# Patient Record
Sex: FEMALE | Race: WHITE | NOT HISPANIC OR LATINO | Employment: FULL TIME | URBAN - METROPOLITAN AREA
[De-identification: names, ages, dates, MRNs, and addresses within clinical notes are randomized per-mention and may not be internally consistent; named-entity substitution may affect disease eponyms.]

---

## 2017-01-03 ENCOUNTER — APPOINTMENT (OUTPATIENT)
Dept: URGENT CARE | Age: 27
End: 2017-01-03
Payer: COMMERCIAL

## 2017-01-03 PROCEDURE — 99214 OFFICE O/P EST MOD 30 MIN: CPT | Performed by: FAMILY MEDICINE

## 2017-01-04 ENCOUNTER — GENERIC CONVERSION - ENCOUNTER (OUTPATIENT)
Dept: OTHER | Facility: OTHER | Age: 27
End: 2017-01-04

## 2017-01-06 ENCOUNTER — ALLSCRIPTS OFFICE VISIT (OUTPATIENT)
Dept: OTHER | Facility: OTHER | Age: 27
End: 2017-01-06

## 2017-03-06 ENCOUNTER — HOSPITAL ENCOUNTER (OUTPATIENT)
Dept: RADIOLOGY | Facility: CLINIC | Age: 27
Discharge: HOME | End: 2017-03-06
Payer: COMMERCIAL

## 2017-03-06 ENCOUNTER — ALLSCRIPTS OFFICE VISIT (OUTPATIENT)
Dept: OTHER | Facility: OTHER | Age: 27
End: 2017-03-06

## 2017-03-06 DIAGNOSIS — M25.572 PAIN IN LEFT ANKLE: ICD-10-CM

## 2017-03-06 PROCEDURE — 73610 X-RAY EXAM OF ANKLE: CPT

## 2017-06-19 ENCOUNTER — ALLSCRIPTS OFFICE VISIT (OUTPATIENT)
Dept: OTHER | Facility: OTHER | Age: 27
End: 2017-06-19

## 2017-06-19 LAB — S PYO AG THROAT QL: NEGATIVE

## 2017-07-25 ENCOUNTER — ALLSCRIPTS OFFICE VISIT (OUTPATIENT)
Dept: OTHER | Facility: OTHER | Age: 27
End: 2017-07-25

## 2017-07-29 ENCOUNTER — APPOINTMENT (OUTPATIENT)
Dept: LAB | Facility: CLINIC | Age: 27
End: 2017-07-29
Payer: COMMERCIAL

## 2017-07-29 ENCOUNTER — TRANSCRIBE ORDERS (OUTPATIENT)
Dept: LAB | Facility: CLINIC | Age: 27
End: 2017-07-29

## 2017-07-29 DIAGNOSIS — Z00.8 HEALTH EXAMINATION IN POPULATION SURVEY: Primary | ICD-10-CM

## 2017-07-29 PROCEDURE — 36415 COLL VENOUS BLD VENIPUNCTURE: CPT

## 2017-08-03 ENCOUNTER — TRANSCRIBE ORDERS (OUTPATIENT)
Dept: ADMINISTRATIVE | Facility: HOSPITAL | Age: 27
End: 2017-08-03

## 2017-08-03 DIAGNOSIS — R10.9 ABDOMINAL PAIN, UNSPECIFIED LOCATION: Primary | ICD-10-CM

## 2017-08-07 ENCOUNTER — APPOINTMENT (OUTPATIENT)
Dept: LAB | Facility: CLINIC | Age: 27
End: 2017-08-07
Payer: COMMERCIAL

## 2017-08-07 DIAGNOSIS — R10.9 ABDOMINAL PAIN, UNSPECIFIED LOCATION: ICD-10-CM

## 2017-08-07 LAB
ALBUMIN SERPL BCP-MCNC: 3.5 G/DL (ref 3.5–5)
ALP SERPL-CCNC: 65 U/L (ref 46–116)
ALT SERPL W P-5'-P-CCNC: 17 U/L (ref 12–78)
AMORPH PHOS CRY URNS QL MICRO: ABNORMAL /HPF
AMYLASE SERPL-CCNC: 26 IU/L (ref 25–115)
ANION GAP SERPL CALCULATED.3IONS-SCNC: 8 MMOL/L (ref 4–13)
AST SERPL W P-5'-P-CCNC: 16 U/L (ref 5–45)
BACTERIA UR QL AUTO: ABNORMAL /HPF
BASOPHILS # BLD AUTO: 0.04 THOUSANDS/ΜL (ref 0–0.1)
BASOPHILS NFR BLD AUTO: 1 % (ref 0–1)
BILIRUB SERPL-MCNC: 0.3 MG/DL (ref 0.2–1)
BILIRUB UR QL STRIP: NEGATIVE
BUN SERPL-MCNC: 12 MG/DL (ref 5–25)
CALCIUM SERPL-MCNC: 8.7 MG/DL (ref 8.3–10.1)
CHLORIDE SERPL-SCNC: 104 MMOL/L (ref 100–108)
CLARITY UR: ABNORMAL
CO2 SERPL-SCNC: 28 MMOL/L (ref 21–32)
COLOR UR: YELLOW
CREAT SERPL-MCNC: 0.78 MG/DL (ref 0.6–1.3)
EOSINOPHIL # BLD AUTO: 0.12 THOUSAND/ΜL (ref 0–0.61)
EOSINOPHIL NFR BLD AUTO: 2 % (ref 0–6)
ERYTHROCYTE [DISTWIDTH] IN BLOOD BY AUTOMATED COUNT: 12.8 % (ref 11.6–15.1)
GFR SERPL CREATININE-BSD FRML MDRD: 104 ML/MIN/1.73SQ M
GLUCOSE P FAST SERPL-MCNC: 83 MG/DL (ref 65–99)
GLUCOSE UR STRIP-MCNC: NEGATIVE MG/DL
HCT VFR BLD AUTO: 39.2 % (ref 34.8–46.1)
HGB BLD-MCNC: 12.8 G/DL (ref 11.5–15.4)
HGB UR QL STRIP.AUTO: ABNORMAL
KETONES UR STRIP-MCNC: NEGATIVE MG/DL
LEUKOCYTE ESTERASE UR QL STRIP: NEGATIVE
LYMPHOCYTES # BLD AUTO: 3.04 THOUSANDS/ΜL (ref 0.6–4.47)
LYMPHOCYTES NFR BLD AUTO: 41 % (ref 14–44)
MCH RBC QN AUTO: 28.3 PG (ref 26.8–34.3)
MCHC RBC AUTO-ENTMCNC: 32.7 G/DL (ref 31.4–37.4)
MCV RBC AUTO: 87 FL (ref 82–98)
MONOCYTES # BLD AUTO: 0.57 THOUSAND/ΜL (ref 0.17–1.22)
MONOCYTES NFR BLD AUTO: 8 % (ref 4–12)
NEUTROPHILS # BLD AUTO: 3.7 THOUSANDS/ΜL (ref 1.85–7.62)
NEUTS SEG NFR BLD AUTO: 48 % (ref 43–75)
NITRITE UR QL STRIP: NEGATIVE
NON-SQ EPI CELLS URNS QL MICRO: ABNORMAL /HPF
PH UR STRIP.AUTO: 7.5 [PH] (ref 4.5–8)
PLATELET # BLD AUTO: 234 THOUSANDS/UL (ref 149–390)
PMV BLD AUTO: 10.9 FL (ref 8.9–12.7)
POTASSIUM SERPL-SCNC: 3.9 MMOL/L (ref 3.5–5.3)
PROT SERPL-MCNC: 7.5 G/DL (ref 6.4–8.2)
PROT UR STRIP-MCNC: NEGATIVE MG/DL
RBC # BLD AUTO: 4.52 MILLION/UL (ref 3.81–5.12)
RBC #/AREA URNS AUTO: ABNORMAL /HPF
SODIUM SERPL-SCNC: 140 MMOL/L (ref 136–145)
SP GR UR STRIP.AUTO: 1.01 (ref 1–1.03)
UROBILINOGEN UR QL STRIP.AUTO: 1 E.U./DL
WBC # BLD AUTO: 7.47 THOUSAND/UL (ref 4.31–10.16)
WBC #/AREA URNS AUTO: ABNORMAL /HPF

## 2017-08-07 PROCEDURE — 85025 COMPLETE CBC W/AUTO DIFF WBC: CPT

## 2017-08-07 PROCEDURE — 81001 URINALYSIS AUTO W/SCOPE: CPT | Performed by: SURGERY

## 2017-08-07 PROCEDURE — 80053 COMPREHEN METABOLIC PANEL: CPT

## 2017-08-07 PROCEDURE — 82150 ASSAY OF AMYLASE: CPT

## 2017-08-07 PROCEDURE — 87086 URINE CULTURE/COLONY COUNT: CPT

## 2017-08-07 PROCEDURE — 36415 COLL VENOUS BLD VENIPUNCTURE: CPT

## 2017-08-08 LAB — BACTERIA UR CULT: NORMAL

## 2017-08-10 ENCOUNTER — HOSPITAL ENCOUNTER (OUTPATIENT)
Dept: RADIOLOGY | Age: 27
Discharge: HOME | End: 2017-08-10
Payer: COMMERCIAL

## 2017-08-10 ENCOUNTER — APPOINTMENT (OUTPATIENT)
Dept: LAB | Age: 27
End: 2017-08-10
Payer: COMMERCIAL

## 2017-08-10 ENCOUNTER — TRANSCRIBE ORDERS (OUTPATIENT)
Dept: ADMINISTRATIVE | Age: 27
End: 2017-08-10

## 2017-08-10 DIAGNOSIS — Z00.8 HEALTH EXAMINATION IN POPULATION SURVEY: Primary | ICD-10-CM

## 2017-08-10 DIAGNOSIS — Z00.8 HEALTH EXAMINATION IN POPULATION SURVEY: ICD-10-CM

## 2017-08-10 DIAGNOSIS — R10.9 ABDOMINAL PAIN, UNSPECIFIED LOCATION: ICD-10-CM

## 2017-08-10 PROCEDURE — 76700 US EXAM ABDOM COMPLETE: CPT

## 2017-08-11 ENCOUNTER — APPOINTMENT (OUTPATIENT)
Dept: LAB | Facility: CLINIC | Age: 27
End: 2017-08-11
Payer: COMMERCIAL

## 2017-08-11 ENCOUNTER — TRANSCRIBE ORDERS (OUTPATIENT)
Dept: LAB | Facility: CLINIC | Age: 27
End: 2017-08-11

## 2017-08-11 DIAGNOSIS — Z00.8 HEALTH EXAMINATION IN POPULATION SURVEY: Primary | ICD-10-CM

## 2017-08-11 LAB
CHOLEST SERPL-MCNC: 142 MG/DL (ref 50–200)
EST. AVERAGE GLUCOSE BLD GHB EST-MCNC: 108 MG/DL
HBA1C MFR BLD: 5.4 % (ref 4.2–6.3)
HDLC SERPL-MCNC: 53 MG/DL (ref 40–60)
LDLC SERPL CALC-MCNC: 75 MG/DL (ref 0–100)
TRIGL SERPL-MCNC: 71 MG/DL

## 2017-08-11 PROCEDURE — 80061 LIPID PANEL: CPT

## 2017-08-11 PROCEDURE — 83036 HEMOGLOBIN GLYCOSYLATED A1C: CPT

## 2017-08-11 PROCEDURE — 36415 COLL VENOUS BLD VENIPUNCTURE: CPT

## 2017-08-16 ENCOUNTER — TRANSCRIBE ORDERS (OUTPATIENT)
Dept: ADMINISTRATIVE | Age: 27
End: 2017-08-16

## 2017-08-16 ENCOUNTER — APPOINTMENT (OUTPATIENT)
Dept: LAB | Age: 27
End: 2017-08-16
Attending: PREVENTIVE MEDICINE
Payer: COMMERCIAL

## 2017-08-16 DIAGNOSIS — Z00.8 HEALTH EXAMINATION IN POPULATION SURVEY: Primary | ICD-10-CM

## 2017-08-16 DIAGNOSIS — Z00.8 HEALTH EXAMINATION IN POPULATION SURVEY: ICD-10-CM

## 2017-08-16 PROCEDURE — 86480 TB TEST CELL IMMUN MEASURE: CPT

## 2017-08-16 PROCEDURE — 36415 COLL VENOUS BLD VENIPUNCTURE: CPT

## 2017-08-18 LAB
ANNOTATION COMMENT IMP: NORMAL
GAMMA INTERFERON BACKGROUND BLD IA-ACNC: 0.03 IU/ML
M TB IFN-G BLD-IMP: NEGATIVE
M TB IFN-G CD4+ BCKGRND COR BLD-ACNC: 0.19 IU/ML
M TB IFN-G CD4+ T-CELLS BLD-ACNC: 0.22 IU/ML
MITOGEN IGNF BLD-ACNC: >10 IU/ML
QUANTIFERON-TB GOLD IN TUBE: NORMAL
SERVICE CMNT-IMP: NORMAL

## 2018-01-10 NOTE — RESULT NOTES
Verified Results  (1) THIN PREP PAP WITH IMAGING [id number] 01:41PM Minerva Haywood     Test Name Result Flag Reference   LAB AP CASE REPORT (Report)     Gynecologic Cytology Report            Case: [id number]                  Authorizing Provider: Baltazar Arechiga MD  Collected:      08/25/2016 1341        First Screen:     KEILA Lama Received:      08/29/2016 1341        Specimen:  LIQUID-BASED PAP, SCREENING, Endocervical   LAB AP GYN PRIMARY INTERPRETATION      Negative for intraepithelial lesion or malignancy  Electronically signed by KEILA Lama on 9/8/2016 at 2:04 PM   LAB AP GYN SPECIMEN ADEQUACY      Satisfactory for evaluation  Endocervical/transformation zone component present  LAB AP GYN NOTE      This specimen was analyzed by the Thin Prep Imaging System  Due to   technical Imaging issues or the physical properties of the slide specimen,   comprehensive manual rescreening by a Cytotechnologist, and/or Pathologist   was indicated  LAB AP GYN ADDITIONAL INFORMATION (Report)     [employer]'s FDA approved ,  and ThinPrep Imaging System are   utilized with strict adherence to the 's instruction manual to   prepare gynecologic and non-gynecologic cytology specimens for the   production of ThinPrep slides as well as for gynecologic ThinPrep imaging  These processes have been validated by our laboratory and/or by the     The Pap test is not a diagnostic procedure and should not be used as the   sole means to detect cervical cancer  It is only a screening procedure to   aid in the detection of cervical cancer and its precursors  Both   false-negative and false-positive results have been experienced  Your   patient's test result should be interpreted in this context together with   the history and clinical findings  Discussion/Summary   negative pap smear    repeat results in 3 years

## 2018-01-12 VITALS
BODY MASS INDEX: 31.65 KG/M2 | WEIGHT: 190 LBS | HEIGHT: 65 IN | HEART RATE: 63 BPM | SYSTOLIC BLOOD PRESSURE: 123 MMHG | DIASTOLIC BLOOD PRESSURE: 81 MMHG

## 2018-01-12 VITALS
DIASTOLIC BLOOD PRESSURE: 100 MMHG | BODY MASS INDEX: 30.7 KG/M2 | SYSTOLIC BLOOD PRESSURE: 144 MMHG | TEMPERATURE: 98.8 F | WEIGHT: 196 LBS | HEART RATE: 70 BPM

## 2018-01-12 NOTE — RESULT NOTES
Message   Pt was on Zpak        Verified Results  (1) THROAT CULTURE (CULTURE, UPPER RESPIRATORY) [id number] 07:04PM Kerri Martinez Order Number: [id number]     Test Name Result Flag Reference   CLINICAL REPORT (Report)     Test:        Throat culture  Specimen Type:   Throat  Specimen Date:   12/28/2016 7:04 PM  Result Date:    1/1/2017 4:10 PM  Result Status:   Final result  Resulting Lab:   [place] [address]            Tel: [phone]      CULTURE                                       ------------------                                   1+ Growth of Beta Hemolytic Streptococcus NOT Group A, C, or G

## 2018-01-13 VITALS
HEIGHT: 65 IN | SYSTOLIC BLOOD PRESSURE: 110 MMHG | TEMPERATURE: 98.9 F | DIASTOLIC BLOOD PRESSURE: 76 MMHG | HEART RATE: 64 BPM

## 2018-01-15 NOTE — MISCELLANEOUS
Message  Spoke with patient in regards to results  Radiology report showed not acute fracture or abnormality  Throat culture positive  Will start patient on antibiotic as she continues to have symptoms  She would also like a work note for yesterday        Plan  Strep pharyngitis    · Azithromycin 250 MG Oral Tablet; TAKE 2 TABLETS ON DAY 1 THEN TAKE 1  TABLET A DAY FOR 4 DAYS    Signatures   Electronically signed by : ADAM Givens; Jun 14 2016  4:10PM EST                       (Author)

## 2018-01-17 NOTE — RESULT NOTES
Message   Please call the patient and let her know that her ultrasound continue bladder was normal as was her urine culture which I did inform her of on Saturday  Her pain may be musculoskeletal in nature  There is no reason why she should continue the anabiotic at this time  Follow-up if symptoms do not improve  Verified Results  US KIDNEY AND BLADDER [id number] 03:10PM Ervin Hill Order Number: [id number]    - Patient Instructions: To schedule this appointment, please contact Central Scheduling at [phone]   Order Number: [id number]    - Patient Instructions: To schedule this appointment, please contact Central Scheduling at [phone]  Test Name Result Flag Reference   US KIDNEY AND BLADDER (Report)     RENAL ULTRASOUND     INDICATION: Tubular interstitial nephritis  COMPARISON: 3/20/2015  TECHNIQUE:  Ultrasound of the retroperitoneum was performed with a curvilinear transducer utilizing volumetric sweeps and still imaging techniques  FINDINGS:     KIDNEYS:   Symmetric and normal size  Right kidney: 11 7 x 4 5 cm  Normal echogenicity and contour  No suspicious masses detected  No hydronephrosis  No shadowing calculi  No perinephric fluid collections  Left kidney: 12 7 x 4 7 cm  Normal echogenicity and contour  No suspicious masses detected  No hydronephrosis  No shadowing calculi  No perinephric fluid collections  URETERS:   Nonvisualized  BLADDER:    Normally distended  No focal thickening or mass lesions  Bilateral ureteral jets detected  IMPRESSION:     Normal         Workstation performed: [id number]     Signed by: Cy Watts MD   8/29/16       Signatures   Electronically signed by : Bunny Romo, [facility];  Aug 29 2016 11:45AM EST                       (Author)

## 2018-01-17 NOTE — PROGRESS NOTES
Assessment    1  Abdominal pain, RUQ (789 01) (R10 11)   2  Need for diphtheria-tetanus-pertussis (Tdap) vaccine, adult/adolescent (V06 1) (Z23)   3  School physical exam (V70 5) (Z02 0)    Plan  Abdominal pain, RUQ    · 2 - Diego Gallagher DO  (General Surgery) Co-Management  *  Status: Active  Requested  for: Daniele Reynolds [employer] Summary provided  : Yes    Discussion/Summary  health maintenance visit To have her right upper quadrant evaluated by her surgeon Currently, she eats a healthy diet  The immunizations are up to date and DTaP given today  #1  Right upper quadrant abdominal pain-intermittent, patient to get an evaluation with her surgeon Dr Soila Valencia  #2  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 with the exception of the right upper quadrant pain  Goal to have her gallbladder taken care of before school starts at the end of August   Patient was given a DTaP today to finish off her immunizations  Follow-up when necessary  Possible side effects of new medications were reviewed with the patient/guardian today  The treatment plan was reviewed with the patient/guardian  The patient/guardian understands and agrees with the treatment plan     Self Referrals: No      Chief Complaint  PE for nursing school - [facility]      History of Present Illness  HM, Adult Female: The patient is being seen for a health maintenance evaluation  The last health maintenance visit was 1 yr year(s) ago  Social History: She is unmarried  Work status: working part-time  The patient has never smoked cigarettes  She reports rare alcohol use  The patient has no concerns about alcohol abuse  She has never used illicit drugs  General Health: The patient's health since the last visit is described as good  She has regular dental visits  She denies vision problems  She denies hearing loss  Immunizations status: up to date  Lifestyle:  She consumes a diverse and healthy diet  She does not have any weight concerns   She exercises regularly  She does not use tobacco  She denies alcohol use  She denies drug use  Reproductive health:  she reports normal menses  she uses no contraception  she is not sexually active  Screening:   HPI: This is a 48-year-old female who comes in for a nursing physical exam  Using the container diet she has lost close to 100 pounds  She is having a problem with her all bladder and this was evaluated in 2015 and was told to hold off on a laparoscopic cholecystectomy, however she has been having recent right upper quadrant pain and would like another consult with her surgeon  Her blood pressure is 96/60, temperature 98 4, weight is 182 pounds  All her immunizations are up-to-date except a DTaP and that will be administered today  Review of Systems    Constitutional: No fever, no chills, feels well, no tiredness, no recent weight gain or weight loss  ENT: no complaints of earache, no loss of hearing, no nose bleeds, no nasal discharge, no sore throat, no hoarseness  Cardiovascular: No complaints of slow heart rate, no fast heart rate, no chest pain, no palpitations, no leg claudication, no lower extremity edema  Respiratory: No complaints of shortness of breath, no wheezing, no cough, no SOB on exertion, no orthopnea, no PND  Gastrointestinal: Intermittent right upper quadrant pain, but as noted in HPI, no nausea, no vomiting, no constipation, no diarrhea and no blood in stools    The patient presents with complaints of sudden onset of intermittent episodes of moderate right upper quadrant abdominal pain, described as sharp, non-radiating  Genitourinary: No complaints of dysuria, no incontinence, no pelvic pain, no dysmenorrhea, no vaginal discharge or bleeding  Active Problems    1  Abdominal pain, RUQ (789 01) (R10 11)   2  Acute bronchitis (466 0) (J20 9)   3  Acute cystitis without hematuria (595 0) (N30 00)   4  Acute upper respiratory infection (465 9) (J06 9)   5   Acute upper respiratory infection (465 9) (J06 9)   6  Acute UTI (599 0) (N39 0)   7  Anxiety (300 00) (F41 9)   8  Bipolar I disorder, most recent episode depressed, severe without psychotic features   (296 53) (F31 4)   9  Cellulitis (682 9) (L03 90)   10  Closed fracture of metatarsal bone (825 25) (S92 309A)   11  Closed fracture of talus (825 21) (S92 109A)   12  CVA tenderness (724 5) (M54 9)   13  Depression (311) (F32 9)   14  Flank pain (789 09) (R10 9)   15  Foot pain (729 5) (M79 673)   16  Headache (784 0) (R51)   17  Injury of left femoral nerve, initial encounter (956 1) (S74 12XA)   18  Injury, ankle (959 7) (S99 919A)   19  Injury, foot (959 7) (S99 929A)   20  Insomnia (780 52) (G47 00)   21  Left ankle pain (719 47) (M25 572)   22  Left leg paresthesias (782 0) (R20 2)   23  Mastodynia (611 71) (N64 4)   24  Nausea (787 02) (R11 0)   25  Nausea with vomiting (787 01) (R11 2)   26  Pain, hand joint, right (719 44) (M79 641)   27  Pharyngitis (462) (J02 9)   28  Pyelonephritis (590 80) (N12)   29  Sinusitis (473 9) (J32 9)   30  Sore throat (462) (J02 9)   31  Sprain of other ligament of left ankle, initial encounter (845 09) (S93 492A)   32  Strep pharyngitis (034 0) (J02 0)   33  URTI (acute upper respiratory infection) (465 9) (J06 9)   34  Weight loss (783 21) (R63 4)   35   Well female exam with routine gynecological exam (V72 31) (Z01 419)    Past Medical History    · History of Ankle pain, unspecified laterality   · History of abdominal pain (V13 89) (O47 140)   · History of Metatarsalgia, unspecified laterality (726 70) (M77 40)   · History of Pain in wrist, unspecified laterality (719 43) (M25 539)   · History of School physical exam (V70 5) (Z02 0)   · History of Self-mutilation Cutting   · History of Sore throat (462) (J02 9)    Surgical History    · History of Dental Surgery    Family History  Mother    · Denied: Family history of Colon cancer   · Denied: Family history of liver disease  Father    · Denied: Family history of Colon cancer   · Denied: Family history of liver disease  Brother    · Family history of Bipolar Disorder  Family History    · Family history of gallbladder disease (V18 59) (Z83 79)    Social History    · Denied: History of Drug use   · Employed   · Exercise habits   · Never a smoker   · Single   · Social alcohol use (Z78 9)    Current Meds   1  Azithromycin 500 MG Oral Tablet; TAKE 1 TABLET DAILY UNTIL FINISHED; Therapy: 17XNJ1151 to (Last Rx:19Jun2017)  Requested for: 71FQE7657 Ordered    Allergies    1  No Known Drug Allergies  Denied    2  Amoxicillin CAPS   3  Penicillins    Vitals   Recorded: 97Ngs6916 12:23PM   Temperature 98 4 F   Heart Rate 64, L Radial   Pulse Quality Regular, L Radial   Respiration 16   Systolic 96, LUE, Sitting   Diastolic 60, LUE, Sitting   BP CUFF SIZE Large   Height 5 ft 5 in   Weight 182 lb    BMI Calculated 30 29   BSA Calculated 1 9     Physical Exam    Constitutional   General appearance: No acute distress, well appearing and well nourished  Eyes   Conjunctiva and lids: No swelling, erythema or discharge  Pupils and irises: Equal, round, reactive to light  Ears, Nose, Mouth, and Throat   External inspection of ears and nose: Normal     Otoscopic examination: Tympanic membranes translucent with normal light reflex  Canals patent without erythema  Nasal mucosa, septum, and turbinates: Normal without edema or erythema  Lips, teeth, and gums: Normal, good dentition  Oropharynx: Normal with no erythema, edema, exudate or lesions  Neck   Neck: Supple, symmetric, trachea midline, no masses  Thyroid: Normal, no thyromegaly  Pulmonary   Auscultation of lungs: Clear to auscultation  Cardiovascular   Auscultation of heart: Normal rate and rhythm, normal S1 and S2, no murmurs  Abdominal aorta: Normal     Abdomen   Abdomen: Abnormal   Tender to palpation right upper quadrant with mild guarding     Lymphatic   Palpation of lymph nodes in neck: No lymphadenopathy  Neurologic   Reflexes: 2+ and symmetric  Psychiatric   Orientation to person, place, and time: Normal     Mood and affect: Normal        Procedure    Procedure: Visual Acuity Test    Indication: routine screening  Inforrmation supplied by a Snellen chart     Results: 20/25 in both eyes with corrective device, 20/30 in the right eye with corrective device, 20/30 in the left eye with corrective device   Color vision was screened with the [place] Test and the results were normal       Signatures   Electronically signed by : Abe [facility]; Jul 25 2017  1:00PM EST                       (Author)    Electronically signed by : Sapna Parsons MD; Jul 26 2017  8:17PM EST                       (Author)

## 2018-01-18 NOTE — MISCELLANEOUS
Message  Return to work or school:   Apryl Morton is under my professional care   She was seen in my office on 02/13/16   Please excuse from work 02/14/16 due to illness           Signatures   Electronically signed by : Belen Dee, [facility]; Feb 13 2016  5:34PM EST                       (Author)    Electronically signed by : Belen Dee, [facility]; Feb 13 2016  6:30PM EST

## 2018-01-18 NOTE — PROGRESS NOTES
Assessment    1  School physical exam (V70 5) (Z02 0)    Chief Complaint  Chief Complaint Free Text Note Form: college physical      History of Present Illness  HPI: See scanned forms   Hospital Based Practices Required Assessment:   Pain Assessment   the patient states they do not have pain  Abuse And Domestic Violence Screen    Yes, the patient is safe at home  The patient states no one is hurting them  Depression And Suicide Screen  No, the patient has not had thoughts of hurting themself  No, the patient has not felt depressed in the past 7 days  Prefered Language is  english  Review of Systems  Focused-Female:   Constitutional: No fever, no chills, feels well, no tiredness, no recent weight gain or loss  ENT: no ear ache, no loss of hearing, no nosebleeds or nasal discharge, no sore throat or hoarseness  Cardiovascular: no complaints of slow or fast heart rate, no chest pain, no palpitations, no leg claudication or lower extremity edema  Respiratory: no complaints of shortness of breath, no wheezing, no dyspnea on exertion, no orthopnea or PND  Breasts: no complaints of breast pain, breast lump or nipple discharge  Gastrointestinal: no complaints of abdominal pain, no constipation, no nausea or diarrhea, no vomiting, no bloody stools  Genitourinary: no complaints of dysuria, no incontinence, no pelvic pain, no dysmenorrhea, no vaginal discharge or abnormal vaginal bleeding  Musculoskeletal: no complaints of arthralgia, no myalgia, no joint swelling or stiffness, no limb pain or swelling  Integumentary: no complaints of skin rash or lesion, no itching or dry skin, no skin wounds  Neurological: no complaints of headache, no confusion, no numbness or tingling, no dizziness or fainting  Active Problems    1  Abdominal pain, RUQ (789 01) (R10 11)   2  Anxiety (300 00) (F41 9)   3   Bipolar I disorder, most recent episode depressed, severe without psychotic features   (296 53) (F31 4)   4  Cellulitis (682 9) (L03 90)   5  Closed fracture of metatarsal bone (825 25) (S92 309A)   6  Closed fracture of talus (825 21) (S92 109A)   7  Depression (311) (F32 9)   8  Flank pain (789 09) (R10 9)   9  Foot pain (729 5) (M79 673)   10  Headache (784 0) (R51)   11  Injury of left femoral nerve, initial encounter (956 1) (S74 12XA)   12  Injury, ankle (959 7) (S99 919A)   13  Injury, foot (959 7) (S99 929A)   14  Insomnia (780 52) (G47 00)   15  Left leg paresthesias (782 0) (R20 2)   16  Mastodynia (611 71) (N64 4)   17  Nausea (787 02) (R11 0)   18  Nausea with vomiting (787 01) (R11 2)   19  Pain, hand joint, right (719 44) (M79 641)   20  Pharyngitis (462) (J02 9)   21  Sinusitis (473 9) (J32 9)   22  Sore throat (462) (J02 9)   23  Strep pharyngitis (034 0) (J02 0)   24  Upper respiratory infection (465 9) (J06 9)   25  URTI (acute upper respiratory infection) (465 9) (J06 9)   26  Weight loss (783 21) (R63 4)    Past Medical History    1  History of Ankle pain, unspecified laterality   2  History of abdominal pain (V13 89) (Z87 898)   3  History of Metatarsalgia, unspecified laterality (726 70) (M77 40)   4  History of Pain in wrist, unspecified laterality (719 43) (M25 539)   5  History of Self-mutilation Cutting  Active Problems And Past Medical History Reviewed: The active problems and past medical history were reviewed and updated today  Family History  Mother    1  Denied: Family history of Colon cancer   2  Denied: Family history of liver disease  Father    3  Denied: Family history of Colon cancer   4  Denied: Family history of liver disease  Brother    5  Family history of Bipolar Disorder  Family History    6  Family history of gallbladder disease (V18 59) (Z83 79)  Family History Reviewed: The family history was reviewed and updated today  Social History    · Never A Smoker   · Social alcohol use (Z78 9)  Social History Reviewed:  The social history was reviewed and updated today  Surgical History    1  History of Dental Surgery  Surgical History Reviewed: The surgical history was reviewed and updated today  Current Meds   1  No Reported Medications  Requested for: 11Aug2016 Recorded  Medication List Reviewed: The medication list was reviewed and updated today  Allergies    1  Penicillins    Vitals  Signs   Recorded: 11Aug2016 08:25AM   Blood Pressure: 116 mm Hg  Blood Pressure: 80 mm Hg  Heart Rate: 67  Respiration: 18  Temperature: 98 7 F, Temporal  Pain Scale: 0  O2 Saturation: 98  Height: 5 ft 5 in  Weight: 193 lb   BMI Calculated: 32 12 kg/m2  BSA Calculated: 1 95 m2    Physical Exam    Constitutional   General appearance: No acute distress, well appearing and well nourished  Eyes   Conjunctiva and lids: No swelling, erythema or discharge  Pupils and irises: Equal, round and reactive to light  Ears, Nose, Mouth, and Throat   External inspection of ears and nose: Normal     Otoscopic examination: Tympanic membranes translucent with normal light reflex  Canals patent without erythema  Nasal mucosa, septum, and turbinates: Normal without edema or erythema  Oropharynx: Normal with no erythema, edema, exudate or lesions  Pulmonary   Respiratory effort: No increased work of breathing or signs of respiratory distress  Auscultation of lungs: Clear to auscultation  Cardiovascular   Palpation of heart: Normal PMI, no thrills  Auscultation of heart: Normal rate and rhythm, normal S1 and S2, without murmurs  Examination of extremities for edema and/or varicosities: Normal     Abdomen   Abdomen: Non-tender, no masses  Liver and spleen: No hepatomegaly or splenomegaly  Lymphatic   Palpation of lymph nodes in neck: No lymphadenopathy  Musculoskeletal   Gait and station: Normal     Digits and nails: Normal without clubbing or cyanosis      Inspection/palpation of joints, bones, and muscles: Normal     Skin   Skin and subcutaneous tissue: Normal without rashes or lesions  Neurologic   Cranial nerves: Cranial nerves 2-12 intact  Reflexes: 2+ and symmetric  Sensation: No sensory loss      Psychiatric   Orientation to person, place, and time: Normal     Mood and affect: Normal        Signatures   Electronically signed by : ORI Bonner ; Aug 11 2016  8:46AM EST                       (Author)

## 2018-01-22 VITALS
HEIGHT: 65 IN | SYSTOLIC BLOOD PRESSURE: 96 MMHG | TEMPERATURE: 98.4 F | RESPIRATION RATE: 16 BRPM | HEART RATE: 64 BPM | WEIGHT: 182 LBS | DIASTOLIC BLOOD PRESSURE: 60 MMHG | BODY MASS INDEX: 30.32 KG/M2

## 2018-01-31 ENCOUNTER — HOSPITAL ENCOUNTER (OUTPATIENT)
Dept: ULTRASOUND IMAGING | Facility: HOSPITAL | Age: 28
Discharge: HOME | End: 2018-01-31
Payer: COMMERCIAL

## 2018-01-31 ENCOUNTER — TRANSCRIBE ORDERS (OUTPATIENT)
Dept: ADMINISTRATIVE | Facility: HOSPITAL | Age: 28
End: 2018-01-31

## 2018-01-31 ENCOUNTER — OFFICE VISIT (OUTPATIENT)
Dept: FAMILY MEDICINE CLINIC | Facility: CLINIC | Age: 28
End: 2018-01-31
Payer: COMMERCIAL

## 2018-01-31 VITALS
TEMPERATURE: 98.6 F | BODY MASS INDEX: 30.66 KG/M2 | SYSTOLIC BLOOD PRESSURE: 100 MMHG | DIASTOLIC BLOOD PRESSURE: 70 MMHG | HEIGHT: 65 IN | HEART RATE: 64 BPM | WEIGHT: 184 LBS

## 2018-01-31 DIAGNOSIS — R31.29 OTHER MICROSCOPIC HEMATURIA: ICD-10-CM

## 2018-01-31 DIAGNOSIS — R10.9 ACUTE LEFT FLANK PAIN: ICD-10-CM

## 2018-01-31 DIAGNOSIS — R68.83 CHILLS (WITHOUT FEVER): ICD-10-CM

## 2018-01-31 DIAGNOSIS — R68.83 CHILLS (WITHOUT FEVER): Primary | ICD-10-CM

## 2018-01-31 LAB
SL AMB  POCT GLUCOSE, UA: NEGATIVE
SL AMB LEUKOCYTE ESTERASE,UA: NEGATIVE
SL AMB POCT BILIRUBIN,UA: NEGATIVE
SL AMB POCT BLOOD,UA: ABNORMAL
SL AMB POCT CLARITY,UA: CLEAR
SL AMB POCT COLOR,UA: YELLOW
SL AMB POCT KETONES,UA: NEGATIVE
SL AMB POCT NITRITE,UA: NEGATIVE
SL AMB POCT PH,UA: 7
SL AMB POCT SPECIFIC GRAVITY,UA: 1.01
UROBILINOGEN UR QL STRIP.AUTO: 0.2 E.U./DL

## 2018-01-31 PROCEDURE — 76770 US EXAM ABDO BACK WALL COMP: CPT

## 2018-01-31 PROCEDURE — 99213 OFFICE O/P EST LOW 20 MIN: CPT | Performed by: FAMILY MEDICINE

## 2018-01-31 PROCEDURE — 87086 URINE CULTURE/COLONY COUNT: CPT | Performed by: FAMILY MEDICINE

## 2018-01-31 PROCEDURE — 81002 URINALYSIS NONAUTO W/O SCOPE: CPT | Performed by: FAMILY MEDICINE

## 2018-01-31 RX ORDER — ONDANSETRON 4 MG/1
TABLET, ORALLY DISINTEGRATING ORAL
COMMUNITY
Start: 2018-01-29 | End: 2019-03-14 | Stop reason: SDUPTHER

## 2018-01-31 RX ORDER — OMEPRAZOLE 40 MG/1
40 CAPSULE, DELAYED RELEASE ORAL DAILY
COMMUNITY
End: 2018-08-09 | Stop reason: ALTCHOICE

## 2018-01-31 NOTE — ASSESSMENT & PLAN NOTE
-patient is tender to palpation and CVA tenderness on the left  She has had a history of renal calculi but mostly on the right

## 2018-01-31 NOTE — ASSESSMENT & PLAN NOTE
-patient's urine dip showed trace of blood  Ultrasound referral   Urine was sent for culture and sensitivity

## 2018-01-31 NOTE — PROGRESS NOTES
Assessment/Plan:    No problem-specific Assessment & Plan notes found for this encounter  Diagnoses and all orders for this visit:    Left flank pain  -     POCT urine dip    Chills (without fever)  -     POCT urine dip    Other orders  -     ondansetron (ZOFRAN-ODT) 4 mg disintegrating tablet;   -     omeprazole (PriLOSEC) 40 MG capsule; Take 40 mg by mouth daily          Subjective:      Patient ID: Alex Ramirez is a 28 y o  female  CC:  Left flank pain that started Sunday  Nausea, chills  States it feels similar to when she had pyelonephritis a few years ago  -   [facility]        The following portions of the patient's history were reviewed and updated as appropriate: allergies, current medications, past family history, past medical history, past social history, past surgical history and problem list     Review of Systems   Constitutional: Negative  HENT: Negative  Eyes: Negative  Respiratory: Negative  Cardiovascular: Negative  Gastrointestinal: Negative  Endocrine: Negative  Genitourinary: Positive for flank pain, frequency, hematuria and urgency  Negative for difficulty urinating, dyspareunia and dysuria  Skin: Negative  Allergic/Immunologic: Negative  Neurological: Negative  Hematological: Negative  Psychiatric/Behavioral: Negative  Objective:     Physical Exam   Constitutional: She is oriented to person, place, and time  She appears well-developed and well-nourished  HENT:   Head: Normocephalic  Right Ear: External ear normal    Left Ear: External ear normal    Mouth/Throat: Oropharynx is clear and moist    Eyes: Conjunctivae and EOM are normal  Pupils are equal, round, and reactive to light  Neck: Normal range of motion  Neck supple  Cardiovascular: Normal rate, regular rhythm and normal heart sounds  Pulmonary/Chest: Effort normal and breath sounds normal    Abdominal: Soft   Bowel sounds are normal    Genitourinary:   Genitourinary Comments: Positive left CVA tenderness   Musculoskeletal: Normal range of motion  Neurological: She is alert and oriented to person, place, and time  Skin: Skin is warm  Psychiatric: She has a normal mood and affect  Her behavior is normal  Judgment and thought content normal    Nursing note and vitals reviewed        Vitals:    01/31/18 0919   BP: 100/70   BP Location: Left arm   Patient Position: Sitting   Cuff Size: Large   Pulse: 64   Temp: 98 6 °F (37 °C)   TempSrc: Oral   Weight: 83 5 kg (184 lb)   Height: 5' 5" (1 651 m)

## 2018-01-31 NOTE — PROGRESS NOTES
Assessment/Plan:    No problem-specific Assessment & Plan notes found for this encounter  Diagnoses and all orders for this visit:    Left flank pain  -     POCT urine dip    Chills (without fever)  -     POCT urine dip    Other orders  -     ondansetron (ZOFRAN-ODT) 4 mg disintegrating tablet;   -     omeprazole (PriLOSEC) 40 MG capsule; Take 40 mg by mouth daily          Subjective:      Patient ID: Dereje Son is a 28 y o  female      HPI    The following portions of the patient's history were reviewed and updated as appropriate: allergies, current medications, past family history, past medical history, past social history, past surgical history and problem list     Review of Systems      Objective:     Physical Exam

## 2018-02-02 ENCOUNTER — OFFICE VISIT (OUTPATIENT)
Dept: FAMILY MEDICINE CLINIC | Facility: CLINIC | Age: 28
End: 2018-02-02
Payer: COMMERCIAL

## 2018-02-02 VITALS
HEART RATE: 68 BPM | DIASTOLIC BLOOD PRESSURE: 66 MMHG | WEIGHT: 183 LBS | HEIGHT: 65 IN | SYSTOLIC BLOOD PRESSURE: 100 MMHG | BODY MASS INDEX: 30.49 KG/M2

## 2018-02-02 DIAGNOSIS — R10.84 GENERALIZED ABDOMINAL PAIN: Primary | ICD-10-CM

## 2018-02-02 DIAGNOSIS — R68.83 CHILLS (WITHOUT FEVER): ICD-10-CM

## 2018-02-02 DIAGNOSIS — R10.9 ACUTE LEFT FLANK PAIN: ICD-10-CM

## 2018-02-02 LAB — BACTERIA UR CULT: NORMAL

## 2018-02-02 PROCEDURE — 99214 OFFICE O/P EST MOD 30 MIN: CPT | Performed by: FAMILY MEDICINE

## 2018-02-02 NOTE — PROGRESS NOTES
I have reviewed the notes, assessments, and/or procedures performed by Rohit Chirinos, I concur with her/his documentation of Hollie Oms

## 2018-02-02 NOTE — ASSESSMENT & PLAN NOTE
Patient has developed intermittent nausea and generalized abdominal discomfort but no actual pain  On her own, she made an appointment with [facility], Dr Pollock Median

## 2018-02-02 NOTE — PROGRESS NOTES
Assessment/Plan:    Acute left flank pain  Left flank pain has resolved considerably with the use of ibuprofen  Her ultrasound of the kidneys and bladder were negative  She does not have a renal calculus  Chills (without fever)  Chills have resolved with ibuprofen  Generalized abdominal pain  Patient has developed intermittent nausea and generalized abdominal discomfort but no actual pain  On her own, she made an appointment with [facility], Dr Kim Montiel  Diagnoses and all orders for this visit:    Generalized abdominal pain  -     Ambulatory referral to Gastroenterology; Future    Acute left flank pain    Chills (without fever)          Subjective:      Patient ID: Darron Baltazar is a 28 y o  female  CC:  Follow up to left flank pain  Pt states symptoms are still present, but somewhat improved with Ibuprofen  -  lsh    HPI:  This is a 50-year-old female who comes in for a 3 day check of her left flank pain  She has been using ibuprofen which has helped the flank pain considerably  The pain is not as severe as it was several days ago  She has not noticed any blood in her urine  Her ultrasound showed no renal calculus  In the meantime she has developed some generalized abdominal discomfort but no pain and has accompaning nausea  The nausea is only slight and it is intermittent  On her own she made an appointment with Dr Laird Cowden gastroenterologist and we will try to move that appointment up because was made for March  The following portions of the patient's history were reviewed and updated as appropriate: allergies, current medications, past family history, past medical history, past social history, past surgical history and problem list     Review of Systems   Constitutional: Negative  HENT: Negative  Eyes: Negative  Respiratory: Negative  Cardiovascular: Negative  Gastrointestinal: Positive for nausea   Negative for abdominal distention, abdominal pain, constipation and diarrhea  Generalized abdominal discomfort   Endocrine: Negative  Genitourinary: Negative  Musculoskeletal: Negative  Skin: Negative  Allergic/Immunologic: Negative  Neurological: Negative  Hematological: Negative  Psychiatric/Behavioral: Negative  Objective:  Vitals:    02/02/18 0804   BP: 100/66   BP Location: Left arm   Patient Position: Sitting   Cuff Size: Large   Pulse: 68   Weight: 83 kg (183 lb)   Height: 5' 5" (1 651 m)      Physical Exam   Constitutional: She is oriented to person, place, and time  She appears well-developed and well-nourished  HENT:   Head: Normocephalic  Right Ear: External ear normal    Left Ear: External ear normal    Mouth/Throat: Oropharynx is clear and moist    Eyes: Conjunctivae and EOM are normal  Pupils are equal, round, and reactive to light  Neck: Normal range of motion  Neck supple  Cardiovascular: Normal rate, regular rhythm and normal heart sounds  Pulmonary/Chest: Effort normal and breath sounds normal    Abdominal: Soft  Bowel sounds are normal  She exhibits no distension and no mass  There is tenderness  There is no rebound and no guarding  Musculoskeletal: Normal range of motion  Neurological: She is alert and oriented to person, place, and time  Skin: Skin is warm  Psychiatric: She has a normal mood and affect  Her behavior is normal  Judgment and thought content normal    Nursing note and vitals reviewed

## 2018-02-19 ENCOUNTER — TELEPHONE (OUTPATIENT)
Dept: URGENT CARE | Facility: MEDICAL CENTER | Age: 28
End: 2018-02-19

## 2018-02-19 ENCOUNTER — APPOINTMENT (OUTPATIENT)
Dept: RADIOLOGY | Facility: MEDICAL CENTER | Age: 28
End: 2018-02-19
Payer: COMMERCIAL

## 2018-02-19 ENCOUNTER — OFFICE VISIT (OUTPATIENT)
Dept: URGENT CARE | Facility: MEDICAL CENTER | Age: 28
End: 2018-02-19
Payer: COMMERCIAL

## 2018-02-19 VITALS
OXYGEN SATURATION: 98 % | RESPIRATION RATE: 18 BRPM | BODY MASS INDEX: 30.12 KG/M2 | WEIGHT: 181 LBS | SYSTOLIC BLOOD PRESSURE: 117 MMHG | TEMPERATURE: 98.9 F | DIASTOLIC BLOOD PRESSURE: 83 MMHG | HEART RATE: 67 BPM

## 2018-02-19 DIAGNOSIS — S93.401A SPRAIN OF RIGHT ANKLE, UNSPECIFIED LIGAMENT, INITIAL ENCOUNTER: Primary | ICD-10-CM

## 2018-02-19 PROCEDURE — 73630 X-RAY EXAM OF FOOT: CPT

## 2018-02-19 PROCEDURE — S9088 SERVICES PROVIDED IN URGENT: HCPCS | Performed by: FAMILY MEDICINE

## 2018-02-19 PROCEDURE — 99213 OFFICE O/P EST LOW 20 MIN: CPT | Performed by: FAMILY MEDICINE

## 2018-02-19 PROCEDURE — 73610 X-RAY EXAM OF ANKLE: CPT

## 2018-02-19 NOTE — PROGRESS NOTES
[facility]        NAME: Óscar Quezada is a 28 y o  female  : 1990    MRN: [id number]  DATE: 2018  TIME: 9:20 AM    Assessment and Plan   Sprain of right ankle, unspecified ligament, initial encounter [S93 401A]  1  Sprain of right ankle, unspecified ligament, initial encounter  XR foot 3+ vw right    XR ankle 3+ vw right         Patient Instructions   No acute fracture noted on xray- will call if radiology report differs  Continue to wear boot for support  Ice 10-15 minutes every 3-4 hours  Keep the foot elevated above the level of the heart  Tylenol/ibprofen for pain  Follow up with your orthopedist if your pain is not improving  Follow up with PCP in 3-5 days  Proceed to  ER if symptoms worsen  Chief Complaint     Chief Complaint   Patient presents with    Ankle Injury    Foot Injury         History of Present Illness   Óscar Quezada presents to the clinic c/o      This is a 21-year-old female presenting after right ankle injury last night  She states that she twisted her ankle in an eversion type fashion, is now having pain in the lateral ankle down through the 4th and 5th metatarsals  His she does have a history of a 5th metatarsal fracture, ankle sprains  She denies any numbness, tingling, weakness  She was not able to weightbear initially after the incident, is now wearing a boot which is helping her to walk  Review of Systems   Review of Systems   Constitutional: Negative for chills, fatigue and fever  Respiratory: Negative  Cardiovascular: Negative  Musculoskeletal: Positive for arthralgias, gait problem and joint swelling  Negative for back pain, neck pain and neck stiffness  Skin: Negative for color change, pallor and wound  Neurological: Negative for dizziness, weakness and light-headedness           Current Medications     Long-Term Prescriptions   Medication Sig Dispense Refill    omeprazole (PriLOSEC) 40 MG capsule Take 40 mg by mouth daily      ondansetron (ZOFRAN-ODT) 4 mg disintegrating tablet          Current Allergies     Allergies as of 02/19/2018    (No Known Allergies)            The following portions of the patient's history were reviewed and updated as appropriate: allergies, current medications, past family history, past medical history, past social history, past surgical history and problem list     Objective   /83   Pulse 67   Temp 98 9 °F (37 2 °C) (Temporal)   Resp 18   Wt 82 1 kg (181 lb)   SpO2 98%   BMI 30 12 kg/m²        Physical Exam     Physical Exam   Constitutional: She appears well-developed and well-nourished  No distress  Cardiovascular: Normal rate, regular rhythm and normal heart sounds  Pulmonary/Chest: Effort normal and breath sounds normal  No respiratory distress  She has no wheezes  She has no rales  Musculoskeletal:     Right foot:  There is mild edema of the foot and ankle, no overlying skin changes or wounds  There is tenderness to palpation over the lateral malleolus, just distal to the lateral malleolus, on the lateral aspect of the foot and into the metatarsals  Range of motion decreased due to pain  Sensation intact, cap refill less than 2 seconds  No joint laxity noted  Neurological: She is alert  Skin: Skin is warm and dry  No rash noted  She is not diaphoretic  No erythema  No pallor  Psychiatric: She has a normal mood and affect

## 2018-02-19 NOTE — PROGRESS NOTES
Pt states she slipped on water in garage yesterday injuring R ankle and foot  Has has previous injuries to same  Pain decreased to 4/10 from 8/10 after motrin

## 2018-02-19 NOTE — PATIENT INSTRUCTIONS
No acute fracture noted on xray- will call if radiology report differs  Continue to wear boot for support  Ice 10-15 minutes every 3-4 hours  Keep the foot elevated above the level of the heart  Tylenol/ibprofen for pain  Follow up with your orthopedist if your pain is not improving  Go to the ER for any distress

## 2018-02-22 ENCOUNTER — OFFICE VISIT (OUTPATIENT)
Dept: OBGYN CLINIC | Facility: MEDICAL CENTER | Age: 28
End: 2018-02-22
Payer: COMMERCIAL

## 2018-02-22 VITALS
DIASTOLIC BLOOD PRESSURE: 78 MMHG | WEIGHT: 159 LBS | HEIGHT: 67 IN | HEART RATE: 84 BPM | BODY MASS INDEX: 24.96 KG/M2 | SYSTOLIC BLOOD PRESSURE: 114 MMHG

## 2018-02-22 DIAGNOSIS — S86.309A PERONEAL TENDON INJURY, UNSPECIFIED LATERALITY, INITIAL ENCOUNTER: Primary | ICD-10-CM

## 2018-02-22 PROCEDURE — 99213 OFFICE O/P EST LOW 20 MIN: CPT | Performed by: ORTHOPAEDIC SURGERY

## 2018-02-22 NOTE — PROGRESS NOTES
Assessment:  No diagnosis found  Patient Active Problem List   Diagnosis    Abdominal pain, RUQ    Anxiety    Bipolar I disorder, most recent episode depressed, severe without psychotic features ([facility] [address] )    Depression    Insomnia    Pyelonephritis    Acute left flank pain    Chills (without fever)    Other microscopic hematuria    Generalized abdominal pain           Plan       plan is to get an MRI given her multiple injuries to this ankle multiple ankle sprains and location of tenderness being on the peroneal tendon sheath I suspect a peroneal tendon tear  She also had significant ecchymosis which is consistent with a ankle sprain but the fact of the matter is we work with this patient many times and she has been able tolerate walking on her ankles normally this time is different she reports more pain and she does tolerate pain rather well  Subjective:     Patient ID:    Chief Complaint:Ulisses Dyer 28 y o  female      HPI    Patient comes in with regards to her right foot and ankle  On Sunday night she was stepping out of the garage and tripped over shoe twisting her ankle in a inverted opposition  She showed pictures to me today that showed extensive ecchymosis that extended basically from the posterior aspect of the fibula all way down the lateral aspect of the foot and dorsum of the foot involving over half of the foot  The ecchymosis is resolving the swelling has significantly improved but she still having pain with ambulation to the point that she can put all her weight on her leg  She reports the pain can be as high as 10 out 10 and as low as 5 out 10  Ice and ibuprofen have been utilize she has a previous Cam boot that she has been wearing as well      The following portions of the patient's history were reviewed and updated as appropriate: allergies, current medications, past family history, past social history, past surgical history and problem list         Social History Social History    Marital status: Single     Spouse name: N/A    Number of children: N/A    Years of education: N/A     Occupational History    employed       Social History Main Topics    Smoking status: Never Smoker    Smokeless tobacco: Never Used    Alcohol use Yes      Comment: occasional - social     Drug use: No    Sexual activity: Not on file     Other Topics Concern    Not on file     Social History Narrative    Exercise habits      Past Medical History:   Diagnosis Date    Foot fracture, right     GERD (gastroesophageal reflux disease)     Metatarsalgia     Self-mutilation     cutting      Past Surgical History:   Procedure Laterality Date    DENTAL SURGERY       No Known Allergies  Current Outpatient Prescriptions on File Prior to Visit   Medication Sig Dispense Refill    omeprazole (PriLOSEC) 40 MG capsule Take 40 mg by mouth daily      ondansetron (ZOFRAN-ODT) 4 mg disintegrating tablet        No current facility-administered medications on file prior to visit  Objective:    Review of Systems   Constitutional: Negative  HENT: Negative  Eyes: Negative  Respiratory: Negative  Cardiovascular: Negative  Gastrointestinal: Negative  Negative for vomiting  Endocrine: Negative  Genitourinary: Negative  Skin: Negative  Allergic/Immunologic: Negative  Neurological: Negative  Hematological: Negative  Psychiatric/Behavioral: Negative  All other systems reviewed and are negative  Right Ankle Exam   Swelling: mild    Tenderness   The patient is experiencing tenderness in the CF and lateral malleolus  Range of Motion   Dorsiflexion: abnormal   Plantar flexion: abnormal   Inversion: abnormal   Eversion: abnormal     Muscle Strength   Right ankle normal muscle strength: I did not test strength because her range of motion is limited as is her pain preventing full assessment      Tests   Anterior drawer: 1+  Other   Erythema: absent  Sensation: normal  Pulse: present     Comments:    Patient is pinpoint tender on the lateral aspect of the ankle specifically anterior talofibular ligament lateral calcaneofibular ligament and posterior calcaneofibular ligament  There is also tenderness along the peroneal tendon as occurs around the lateral malleolus and inserts into the 5th metatarsal   Patient does have some mild base of metatarsal pain but it is not as extensive as 1-2 cm proximal to this area  Left Ankle Exam   Left ankle exam is normal             Physical Exam   Constitutional: She is oriented to person, place, and time  She appears well-developed  HENT:   Head: Normocephalic  Eyes: Pupils are equal, round, and reactive to light  Neck: Normal range of motion  Cardiovascular: Normal rate  Pulmonary/Chest: Effort normal    Abdominal: She exhibits no distension  Neurological: She is alert and oriented to person, place, and time  Skin: Skin is warm  Psychiatric: She has a normal mood and affect  Nursing note and vitals reviewed  I have personally reviewed pertinent films in PACS  Portions of the record may have been created with voice recognition software   Occasional wrong word or "sound a like" substitutions may have occurred due to the inherent limitations of voice recognition software   Read the chart carefully and recognize, using context, where substitutions have occurred

## 2018-02-24 ENCOUNTER — HOSPITAL ENCOUNTER (OUTPATIENT)
Dept: MRI IMAGING | Facility: HOSPITAL | Age: 28
Discharge: HOME | End: 2018-02-24
Attending: ORTHOPAEDIC SURGERY
Payer: COMMERCIAL

## 2018-02-24 DIAGNOSIS — S86.309A PERONEAL TENDON INJURY, UNSPECIFIED LATERALITY, INITIAL ENCOUNTER: ICD-10-CM

## 2018-02-24 PROCEDURE — 73721 MRI JNT OF LWR EXTRE W/O DYE: CPT

## 2018-02-26 ENCOUNTER — HOSPITAL ENCOUNTER (OUTPATIENT)
Dept: MRI IMAGING | Facility: HOSPITAL | Age: 28
Discharge: HOME | End: 2018-02-26

## 2018-02-26 DIAGNOSIS — M25.571 RIGHT ANKLE PAIN: ICD-10-CM

## 2018-03-01 ENCOUNTER — OFFICE VISIT (OUTPATIENT)
Dept: OBGYN CLINIC | Facility: MEDICAL CENTER | Age: 28
End: 2018-03-01
Payer: COMMERCIAL

## 2018-03-01 VITALS
DIASTOLIC BLOOD PRESSURE: 82 MMHG | WEIGHT: 159 LBS | HEART RATE: 66 BPM | SYSTOLIC BLOOD PRESSURE: 120 MMHG | BODY MASS INDEX: 24.96 KG/M2 | HEIGHT: 67 IN

## 2018-03-01 DIAGNOSIS — T14.8XXA CONTUSION OF BONE: ICD-10-CM

## 2018-03-01 DIAGNOSIS — S86.301A INJURY OF PERONEAL TENDON OF RIGHT FOOT, INITIAL ENCOUNTER: Primary | ICD-10-CM

## 2018-03-01 PROCEDURE — 99212 OFFICE O/P EST SF 10 MIN: CPT | Performed by: ORTHOPAEDIC SURGERY

## 2018-03-01 NOTE — LETTER
March 1, 2018     Patient: Timothy Waldron   YOB: 1990   Date of Visit: 3/1/2018       To Whom it May Concern:    Timothy Waldron is under my professional care  She was seen in my office on 3/1/2018  She may return to work with limitations She may also return to school clinical [employer] with the same restriction  no more than 30 minutes of walking icing periodically up to 3 times a day for 20 minutes  Must wear the boot  Otherwise activity is unrestricted       If you have any questions or concerns, please don't hesitate to call           Sincerely,          Jadiel Mtz DO        CC: Timothy Waldron

## 2018-03-01 NOTE — PROGRESS NOTES
Assessment:  1  Injury of peroneal tendon of right foot, initial encounter  Ambulatory referral to Physical Therapy   2  Contusion of bone       Patient Active Problem List   Diagnosis    Abdominal pain, RUQ    Anxiety    Bipolar I disorder, most recent episode depressed, severe without psychotic features ([facility] [address] )    Depression    Insomnia    Pyelonephritis    Acute left flank pain    Chills (without fever)    Other microscopic hematuria    Generalized abdominal pain    Peroneal tendon injury    Contusion of bone           Plan       continue with current restrictions  Use Epson salt soaks will also provide steroid to help with the inflammation   Follow up with us in 4 weeks at which time she will be 6 weeks out from her injury and hopefully we can allow her to return to normal activity  Subjective:     Patient ID:    Chief Complaint:Ulisses Dyer 25 y o  female      HPI      Patient comes in today for routine follow-up regarding her right ankle we ordered an MRI for suspicion of a peroneal tendon tear  She has had multiple injuries to this ankle and had extensive swelling and edema and ecchymosis  Patient reports that it has been aggravated since we last saw her has had increased swelling as well        The following portions of the patient's history were reviewed and updated as appropriate: allergies, current medications, past family history, past social history, past surgical history and problem list         Social History     Social History    Marital status: Single     Spouse name: N/A    Number of children: N/A    Years of education: N/A     Occupational History    employed       Social History Main Topics    Smoking status: Never Smoker    Smokeless tobacco: Never Used    Alcohol use Yes      Comment: occasional - social     Drug use: No    Sexual activity: Not on file     Other Topics Concern    Not on file     Social History Narrative    Exercise habits      Past Medical History:   Diagnosis Date    Foot fracture, right     GERD (gastroesophageal reflux disease)     Metatarsalgia     Self-mutilation     cutting      Past Surgical History:   Procedure Laterality Date    DENTAL SURGERY       No Known Allergies  Current Outpatient Prescriptions on File Prior to Visit   Medication Sig Dispense Refill    omeprazole (PriLOSEC) 40 MG capsule Take 40 mg by mouth daily      ondansetron (ZOFRAN-ODT) 4 mg disintegrating tablet        No current facility-administered medications on file prior to visit  Objective:        Ortho Exam     exam shows swelling ecchymosis is almost resolved but the swelling is still present  Range of motion is slightly limited as well  I have personally reviewed pertinent films in PACS and my interpretation is Inflammation around the peroneal tendon posterior tibial tendon  There is also bony edema in the cuboid    further examination of the  MRI shows that there may be a partial tear of the peroneal tendon just distal to the fibula  Best seen on image 22 series 6 the tear is less than 50%    Portions of the record may have been created with voice recognition software   Occasional wrong word or "sound a like" substitutions may have occurred due to the inherent limitations of voice recognition software   Read the chart carefully and recognize, using context, where substitutions have occurred

## 2018-03-12 ENCOUNTER — OFFICE VISIT (OUTPATIENT)
Dept: FAMILY MEDICINE CLINIC | Facility: CLINIC | Age: 28
End: 2018-03-12
Payer: COMMERCIAL

## 2018-03-12 VITALS
WEIGHT: 184 LBS | DIASTOLIC BLOOD PRESSURE: 64 MMHG | HEIGHT: 67 IN | SYSTOLIC BLOOD PRESSURE: 94 MMHG | BODY MASS INDEX: 28.88 KG/M2 | TEMPERATURE: 98.5 F | HEART RATE: 68 BPM

## 2018-03-12 DIAGNOSIS — J01.90 ACUTE NON-RECURRENT SINUSITIS, UNSPECIFIED LOCATION: Primary | ICD-10-CM

## 2018-03-12 PROCEDURE — 99213 OFFICE O/P EST LOW 20 MIN: CPT | Performed by: FAMILY MEDICINE

## 2018-03-12 RX ORDER — AZITHROMYCIN 500 MG/1
500 TABLET, FILM COATED ORAL DAILY
Qty: 3 TABLET | Refills: 0 | Status: SHIPPED | OUTPATIENT
Start: 2018-03-12 | End: 2018-03-15

## 2018-03-12 NOTE — ASSESSMENT & PLAN NOTE
The patient was placed on Zithromax 500 mg 1 daily #3  She is to use Coricidin HBP/cold and flu 1 tablet 3 times a day and Delsym 2 tsp twice a day for her cough

## 2018-03-12 NOTE — PROGRESS NOTES
Assessment/Plan:    Acute non-recurrent sinusitis  The patient was placed on Zithromax 500 mg 1 daily #3  She is to use Coricidin HBP/cold and flu 1 tablet 3 times a day and Delsym 2 tsp twice a day for her cough  Diagnoses and all orders for this visit:    Acute non-recurrent sinusitis, unspecified location  -     azithromycin (ZITHROMAX) 500 MG tablet; Take 1 tablet (500 mg total) by mouth daily for 3 days          Subjective:      Patient ID: Adriane Winchester is a 28 y o  female  CC:  Sore throat, B/L ear pain, sinus pressure that started Friday  PND   -  lsh    HPI:  This is a 23-year-old female who comes in with a sore throat, bilateral ear pain, sinus pressure and postnasal drip for the past 5 days  She has taken medication with a decongestant in and almost passed out in the shower  She has been warned in the past not to take anything with the decongestant because of her blood pressure  Her blood pressure today is 94/64 and her temperature is 98 5°  She does have a nonproductive slight cough which can keep her awake at night  She denies any nausea, vomiting or diarrhea  The following portions of the patient's history were reviewed and updated as appropriate: allergies, current medications, past family history, past medical history, past social history, past surgical history and problem list     Review of Systems   Constitutional: Negative  Negative for chills, fatigue and fever  HENT: Positive for congestion, ear pain, postnasal drip, rhinorrhea, sinus pressure and sore throat  Eyes: Negative  Respiratory: Positive for cough  Negative for shortness of breath and wheezing  Cardiovascular: Negative  Gastrointestinal: Negative  Negative for diarrhea, nausea and vomiting  Endocrine: Negative  Genitourinary: Negative  Musculoskeletal: Positive for myalgias  Skin: Negative  Allergic/Immunologic: Negative  Neurological: Negative  Hematological: Negative  Psychiatric/Behavioral: Negative  Vitals:    03/12/18 1348   BP: 94/64   BP Location: Left arm   Patient Position: Sitting   Cuff Size: Large   Pulse: 68   Temp: 98 5 °F (36 9 °C)   TempSrc: Oral   Weight: 83 5 kg (184 lb)   Height: 5' 7" (1 702 m)   Objective:      BP 94/64 (BP Location: Left arm, Patient Position: Sitting, Cuff Size: Large)   Pulse 68   Temp 98 5 °F (36 9 °C) (Oral)   Ht 5' 7" (1 702 m)   Wt 83 5 kg (184 lb)   BMI 28 82 kg/m²          Physical Exam   Constitutional: She is oriented to person, place, and time  She appears well-developed and well-nourished  HENT:   Head: Normocephalic  Mouth/Throat: No oropharyngeal exudate  Tympanic membranes bilaterally are dull with no erythema or injection  Positive postnasal drip, +1 erythema of posterior pharynx without exudates  Eyes: Conjunctivae and EOM are normal  Pupils are equal, round, and reactive to light  Neck: Normal range of motion  Neck supple  Cardiovascular: Normal rate, regular rhythm and normal heart sounds  Pulmonary/Chest: Effort normal and breath sounds normal  She has no wheezes  She has no rales  Abdominal: Soft  Bowel sounds are normal    Musculoskeletal: Normal range of motion  Lymphadenopathy:     She has no cervical adenopathy  Neurological: She is alert and oriented to person, place, and time  Skin: Skin is warm  Psychiatric: She has a normal mood and affect   Her behavior is normal  Judgment and thought content normal

## 2018-03-16 ENCOUNTER — OFFICE VISIT (OUTPATIENT)
Dept: FAMILY MEDICINE CLINIC | Facility: CLINIC | Age: 28
End: 2018-03-16
Payer: COMMERCIAL

## 2018-03-16 VITALS
BODY MASS INDEX: 28.72 KG/M2 | WEIGHT: 183 LBS | TEMPERATURE: 97.8 F | HEIGHT: 67 IN | DIASTOLIC BLOOD PRESSURE: 82 MMHG | SYSTOLIC BLOOD PRESSURE: 106 MMHG

## 2018-03-16 DIAGNOSIS — R05.9 COUGH: ICD-10-CM

## 2018-03-16 DIAGNOSIS — J01.90 ACUTE NON-RECURRENT SINUSITIS, UNSPECIFIED LOCATION: Primary | ICD-10-CM

## 2018-03-16 PROCEDURE — 99213 OFFICE O/P EST LOW 20 MIN: CPT | Performed by: FAMILY MEDICINE

## 2018-03-16 RX ORDER — PREDNISONE 10 MG/1
10 TABLET ORAL DAILY
Qty: 30 TABLET | Refills: 0 | Status: SHIPPED | OUTPATIENT
Start: 2018-03-16 | End: 2018-08-09 | Stop reason: ALTCHOICE

## 2018-03-16 RX ORDER — BENZONATATE 200 MG/1
200 CAPSULE ORAL 3 TIMES DAILY
Qty: 30 CAPSULE | Refills: 1 | Status: SHIPPED | OUTPATIENT
Start: 2018-03-16 | End: 2018-08-09 | Stop reason: ALTCHOICE

## 2018-03-16 NOTE — ASSESSMENT & PLAN NOTE
Patient finished her 3 day course of Zithromax 500 mg  She is to continue Coricidin HBP/cold and flu

## 2018-03-16 NOTE — ASSESSMENT & PLAN NOTE
She was given a course of prednisone 10 mg 1 and tablet 3 times a day with meals  She was also given a sample of Symbicort 80/4 52 puffs twice a day and Tessalon Perles 200 mg 1 3 times a day with meals #30 with 1 refill

## 2018-03-16 NOTE — PROGRESS NOTES
Assessment/Plan:    Acute non-recurrent sinusitis  Patient finished her 3 day course of Zithromax 500 mg  She is to continue Coricidin HBP/cold and flu  Cough  She was given a course of prednisone 10 mg 1 and tablet 3 times a day with meals  She was also given a sample of Symbicort 80/4 52 puffs twice a day and Tessalon Perles 200 mg 1 3 times a day with meals #30 with 1 refill  Diagnoses and all orders for this visit:    Acute non-recurrent sinusitis, unspecified location  -     predniSONE 10 mg tablet; Take 1 tablet (10 mg total) by mouth daily  -     benzonatate (TESSALON) 200 MG capsule; Take 1 capsule (200 mg total) by mouth 3 (three) times a day    Cough  -     predniSONE 10 mg tablet; Take 1 tablet (10 mg total) by mouth daily  -     benzonatate (TESSALON) 200 MG capsule; Take 1 capsule (200 mg total) by mouth 3 (three) times a day          Subjective:      Patient ID: Vernell Irizarry is a 28 y o  female  HPI:  This is a 79-year-old female who comes in after 3 days for symptoms that have continued  She was placed on Zithromax 500 mg 1 daily #3  And Coricidin HBP and Delsym 2 tsp twice a day for her cough  The dry cough continues and she still has head congestion  It was explained to her that the Zithromax continues to work even though she is out of her medication  She is to continue the Coricidin and the Delsym  And inhaler will be given to her today along with a short course of prednisone and Tessalon Perles  Pt states she was seen on Monday symptoms of pain in ears,sorethroat was given a z-blayne, Pt states she is no better after finishing this  The following portions of the patient's history were reviewed and updated as appropriate: allergies, current medications, past family history, past medical history, past social history, past surgical history and problem list     Review of Systems   Constitutional: Negative      HENT: Positive for congestion, ear pain, postnasal drip and rhinorrhea  Eyes: Negative  Respiratory: Positive for cough  Negative for shortness of breath and wheezing  Cardiovascular: Negative  Gastrointestinal: Negative  Endocrine: Negative  Genitourinary: Negative  Musculoskeletal: Negative  Skin: Negative  Allergic/Immunologic: Negative  Neurological: Negative  Hematological: Negative  Psychiatric/Behavioral: Negative  Objective:  Vitals:    03/16/18 0930   BP: 106/82   Temp: 97 8 °F (36 6 °C)   Weight: 83 kg (183 lb)   Height: 5' 7" (1 702 m)         /82   Temp 97 8 °F (36 6 °C)   Ht 5' 7" (1 702 m)   Wt 83 kg (183 lb)   BMI 28 66 kg/m²          Physical Exam   Constitutional: She is oriented to person, place, and time  She appears well-developed and well-nourished  HENT:   Head: Normocephalic  Right Ear: External ear normal    Left Ear: External ear normal    Tympanic membranes dull bilaterally with no injection or erythema  Slight postnasal drip  No erythema of posterior pharynx and no exudates   Eyes: Conjunctivae and EOM are normal  Pupils are equal, round, and reactive to light  Neck: Normal range of motion  Neck supple  Cardiovascular: Normal rate, regular rhythm and normal heart sounds  Pulmonary/Chest: Effort normal and breath sounds normal    Abdominal: Soft  Bowel sounds are normal    Musculoskeletal: Normal range of motion  Neurological: She is alert and oriented to person, place, and time  Skin: Skin is warm  Psychiatric: She has a normal mood and affect   Her behavior is normal  Judgment and thought content normal

## 2018-03-23 ENCOUNTER — OFFICE VISIT (OUTPATIENT)
Dept: OBGYN CLINIC | Facility: HOSPITAL | Age: 28
End: 2018-03-23
Payer: COMMERCIAL

## 2018-03-23 VITALS — DIASTOLIC BLOOD PRESSURE: 76 MMHG | HEART RATE: 80 BPM | RESPIRATION RATE: 18 BRPM | SYSTOLIC BLOOD PRESSURE: 113 MMHG

## 2018-03-23 DIAGNOSIS — S86.301A INJURY OF PERONEAL TENDON OF RIGHT FOOT, INITIAL ENCOUNTER: Primary | ICD-10-CM

## 2018-03-23 PROCEDURE — 99212 OFFICE O/P EST SF 10 MIN: CPT | Performed by: ORTHOPAEDIC SURGERY

## 2018-03-23 NOTE — LETTER
March 23, 2018     Patient: Beth Davis   YOB: 1990   Date of Visit: 3/23/2018       To Whom it May Concern:    Beth Davis is under my professional care  She was seen in my office on 3/23/2018  She Is able to participate in work activities as long as she is wearing the Cam walker boot  She can also participate in school  Clinical and anything else that is part of her participation with school activities as long as she is wearing the Cam walker boot  She can also participate in recreational activities like going to [employer]  Or [employer]  As long as she wears the boot  There will come a time in the near future where she will have decreased swelling so she can't put her foot into a sneaker at which time she can wear a sneaker  If you have any questions or concerns, please don't hesitate to call           Sincerely,          Lindsey Bang DO        CC: Beth Davis

## 2018-03-23 NOTE — PROGRESS NOTES
Assessment:  1  Injury of peroneal tendon of right foot, initial encounter       Patient Active Problem List   Diagnosis    Abdominal pain, RUQ    Anxiety    Bipolar I disorder, most recent episode depressed, severe without psychotic features ([facility] [address] )    Depression    Insomnia    Pyelonephritis    Acute left flank pain    Chills (without fever)    Other microscopic hematuria    Generalized abdominal pain    Peroneal tendon injury    Contusion of bone    Acute non-recurrent sinusitis    Cough           Plan        Activity as tolerated in the Cam walker boot she may wean the Cam walker boot as she sees fit as long as the swelling decreases in the range of motion improves  Patient will start therapy when she can do that around clinical            Subjective:     Patient ID:    Chief Complaint:Ulisses Dyer 25 y o  female      HPI     patient comes into today's visit she is 4 weeks out from an ankle sprain we had an MRI to make sure that there was no peroneal tendon tear there is inflammation but no jake tear  She reports that she is improving but she still has swelling to the point that she can't fit into a shoe or normal sneaker        The following portions of the patient's history were reviewed and updated as appropriate: allergies, current medications, past family history, past social history, past surgical history and problem list         Social History     Social History    Marital status: Single     Spouse name: N/A    Number of children: N/A    Years of education: N/A     Occupational History    employed       Social History Main Topics    Smoking status: Never Smoker    Smokeless tobacco: Never Used    Alcohol use Yes      Comment: occasional - social     Drug use: No    Sexual activity: Not on file     Other Topics Concern    Not on file     Social History Narrative    Exercise habits      Past Medical History:   Diagnosis Date    Foot fracture, right     GERD (gastroesophageal reflux disease)     Metatarsalgia     Self-mutilation     cutting      Past Surgical History:   Procedure Laterality Date    DENTAL SURGERY       No Known Allergies  Current Outpatient Prescriptions on File Prior to Visit   Medication Sig Dispense Refill    benzonatate (TESSALON) 200 MG capsule Take 1 capsule (200 mg total) by mouth 3 (three) times a day 30 capsule 1    ondansetron (ZOFRAN-ODT) 4 mg disintegrating tablet       omeprazole (PriLOSEC) 40 MG capsule Take 40 mg by mouth daily      predniSONE 10 mg tablet Take 1 tablet (10 mg total) by mouth daily 30 tablet 0     No current facility-administered medications on file prior to visit  Objective:        Ortho Exam          Range of motion limited in swelling still present  Portions of the record may have been created with voice recognition software   Occasional wrong word or "sound a like" substitutions may have occurred due to the inherent limitations of voice recognition software   Read the chart carefully and recognize, using context, where substitutions have occurred

## 2018-06-01 ENCOUNTER — OFFICE VISIT (OUTPATIENT)
Dept: OBGYN CLINIC | Facility: HOSPITAL | Age: 28
End: 2018-06-01
Payer: COMMERCIAL

## 2018-06-01 VITALS
HEART RATE: 68 BPM | SYSTOLIC BLOOD PRESSURE: 116 MMHG | BODY MASS INDEX: 29.01 KG/M2 | DIASTOLIC BLOOD PRESSURE: 79 MMHG | WEIGHT: 185.2 LBS

## 2018-06-01 DIAGNOSIS — S86.301A INJURY OF PERONEAL TENDON OF RIGHT FOOT, INITIAL ENCOUNTER: Primary | ICD-10-CM

## 2018-06-01 PROCEDURE — 99212 OFFICE O/P EST SF 10 MIN: CPT | Performed by: ORTHOPAEDIC SURGERY

## 2018-06-01 NOTE — PROGRESS NOTES
Assessment:  1  Injury of peroneal tendon of right foot, initial encounter       Patient Active Problem List   Diagnosis    Abdominal pain, RUQ    Anxiety    Bipolar I disorder, most recent episode depressed, severe without psychotic features ([facility] [address] )    Depression    Insomnia    Pyelonephritis    Acute left flank pain    Chills (without fever)    Other microscopic hematuria    Generalized abdominal pain    Peroneal tendon injury    Contusion of bone    Acute non-recurrent sinusitis    Cough           Plan      MRI patient has had persistent pain and swelling is tender over the peroneal tendon especially around the lateral malleolus anterior drawer is negative but painful  Subjective:     Patient ID:    Chief Complaint:Ulisses Dyer 25 y o  female      HPI      Patient comes in today with regards to her right ankle she has had multiple injuries to this ankle most recently she had an injury to this ankle back in January  We treated her conservatively she comes back because she still having pain she still having difficulty wearing normal shoes  Her pain is on the lateral aspect the ankle wraps around the fibula and goes into the foot lateral aspect and dorsum        The following portions of the patient's history were reviewed and updated as appropriate: allergies, current medications, past family history, past social history, past surgical history and problem list     All organ systems normal    Social History     Social History    Marital status: Single     Spouse name: N/A    Number of children: N/A    Years of education: N/A     Occupational History    employed       Social History Main Topics    Smoking status: Never Smoker    Smokeless tobacco: Never Used    Alcohol use Yes      Comment: occasional - social     Drug use: No    Sexual activity: Not on file     Other Topics Concern    Not on file     Social History Narrative    Exercise habits      Past Medical History:   Diagnosis Date    Foot fracture, right     GERD (gastroesophageal reflux disease)     Metatarsalgia     Self-mutilation     cutting      Past Surgical History:   Procedure Laterality Date    DENTAL SURGERY       No Known Allergies  Current Outpatient Prescriptions on File Prior to Visit   Medication Sig Dispense Refill    benzonatate (TESSALON) 200 MG capsule Take 1 capsule (200 mg total) by mouth 3 (three) times a day 30 capsule 1    omeprazole (PriLOSEC) 40 MG capsule Take 40 mg by mouth daily      ondansetron (ZOFRAN-ODT) 4 mg disintegrating tablet       predniSONE 10 mg tablet Take 1 tablet (10 mg total) by mouth daily 30 tablet 0     No current facility-administered medications on file prior to visit  Objective:        Ortho Exam    Pinpoint tenderness over the peroneal tendon some tenderness of the ATFL and lateral calcaneofibular ligament anterior drawer test is negative  Mild swelling      I have personally reviewed pertinent films in PACS  Portions of the record may have been created with voice recognition software   Occasional wrong word or "sound a like" substitutions may have occurred due to the inherent limitations of voice recognition software   Read the chart carefully and recognize, using context, where substitutions have occurred

## 2018-06-01 NOTE — LETTER
June 1, 2018     Patient: Gina Lane   YOB: 1990   Date of Visit: 6/1/2018       To Whom it May Concern:    Gina Lane is under my professional care  She was seen in my office on 6/1/2018  She may return to work on 06/01/2018  Patient may wear sneakers at work       If you have any questions or concerns, please don't hesitate to call           Sincerely,          Coty Bucio DO        CC: Gina Lane

## 2018-06-04 ENCOUNTER — OFFICE VISIT (OUTPATIENT)
Dept: URGENT CARE | Facility: MEDICAL CENTER | Age: 28
End: 2018-06-04
Payer: COMMERCIAL

## 2018-06-04 ENCOUNTER — HOSPITAL ENCOUNTER (OUTPATIENT)
Dept: RADIOLOGY | Facility: MEDICAL CENTER | Age: 28
Discharge: HOME | End: 2018-06-04

## 2018-06-04 VITALS
OXYGEN SATURATION: 99 % | RESPIRATION RATE: 20 BRPM | HEART RATE: 76 BPM | BODY MASS INDEX: 28.94 KG/M2 | SYSTOLIC BLOOD PRESSURE: 110 MMHG | WEIGHT: 184.8 LBS | TEMPERATURE: 98.8 F | DIASTOLIC BLOOD PRESSURE: 76 MMHG

## 2018-06-04 DIAGNOSIS — S92.352A CLOSED FRACTURE OF FIFTH METATARSAL BONE OF LEFT FOOT, PHYSEAL INVOLVEMENT UNSPECIFIED, INITIAL ENCOUNTER: Primary | ICD-10-CM

## 2018-06-04 DIAGNOSIS — M25.579 ACUTE ANKLE PAIN, UNSPECIFIED LATERALITY: ICD-10-CM

## 2018-06-04 DIAGNOSIS — Z32.02 ENCOUNTER FOR PREGNANCY TEST WITH RESULT NEGATIVE: ICD-10-CM

## 2018-06-04 DIAGNOSIS — S90.02XA CONTUSION OF LEFT ANKLE, INITIAL ENCOUNTER: ICD-10-CM

## 2018-06-04 LAB — SL AMB POCT URINE HCG: NEGATIVE

## 2018-06-04 PROCEDURE — 99203 OFFICE O/P NEW LOW 30 MIN: CPT | Performed by: PHYSICIAN ASSISTANT

## 2018-06-04 PROCEDURE — 73610 X-RAY EXAM OF ANKLE: CPT

## 2018-06-04 PROCEDURE — S9088 SERVICES PROVIDED IN URGENT: HCPCS | Performed by: PHYSICIAN ASSISTANT

## 2018-06-04 PROCEDURE — 81025 URINE PREGNANCY TEST: CPT | Performed by: PHYSICIAN ASSISTANT

## 2018-06-04 NOTE — PATIENT INSTRUCTIONS
Foot fracture  Short leg cast  Ankle/ foot contusion  Rest  Ice, elevate  Naprosyn twice daily as needed for pain    Follow up with PCP in 3-5 days  Proceed to  ER if symptoms worsen  Foot Contusion   WHAT YOU NEED TO KNOW:   A foot contusion is a bruise to the foot  DISCHARGE INSTRUCTIONS:   Medicines:   · NSAIDs:  These medicines decrease swelling and pain  NSAIDs are available without a doctor's order  Ask your healthcare provider which medicine is right for you  Ask how much to take and when to take it  Take as directed  NSAIDs can cause stomach bleeding and kidney problems if not taken correctly  · Take your medicine as directed  Contact your healthcare provider if you think your medicine is not helping or if you have side effects  Tell him of her if you are allergic to any medicine  Keep a list of the medicines, vitamins, and herbs you take  Include the amounts, and when and why you take them  Bring the list or the pill bottles to follow-up visits  Carry your medicine list with you in case of an emergency  Follow up with your healthcare provider as directed:  Write down your questions so you remember to ask them during your visits  Care for your foot: Follow your treatment plan to help decrease your pain and improve your muscle movement  · Rest:  You will need to rest your foot for 1 to 2 days after your injury  This will help decrease the risk of more damage  · Ice:  Ice helps decrease swelling and pain  Ice may also help prevent tissue damage  Use an ice pack, or put crushed ice in a plastic bag  Cover it with a towel and place it on your foot for 15 to 20 minutes every hour or as directed  · Compression:  Compression (tight hold) provides support and helps decrease swelling and movement so your foot can heal  You may be told to keep your foot wrapped with a tight elastic bandage  Follow instructions about how to apply your bandage  Do not massage your foot   You could cause more damage or pain     · Elevation:  Keep your foot raised above the level of your heart while you are sitting or lying down  This will help decrease or limit swelling  Use pillows, blankets, or rolled towels to elevate your foot comfortably  Exercise your foot:  You may be given gentle exercises to improve your foot movement and help decrease stiffness  Ask when you can return to your normal activities or sports  Prevent another injury:   · Wear equipment to protect yourself when you play sports  · Make sure your shoes fit properly  · Always wear shoes on streets or sidewalks  · Clean spills off the floor right away to avoid slipping or hitting your foot  · Make sure your home is well lit when you get up during the night  This will help you avoid hurting your foot in the dark  Contact your healthcare provider if:   · You have increased swelling on your foot  · You have severe foot pain  · You are not able to move your foot  · You have questions or concerns about your injury or treatment  © 2017 [facility] Information is for End User's use only and may not be sold, redistributed or otherwise used for commercial purposes  All illustrations and images included in CareNotes® are the copyrighted property of A D [employer] M [employer]  or [employer]  The above information is an  only  It is not intended as medical advice for individual conditions or treatments  Talk to your doctor, nurse or pharmacist before following any medical regimen to see if it is safe and effective for you

## 2018-06-04 NOTE — PROGRESS NOTES
[facility]        NAME: Maine Rothman is a 28 y o  female  : 1990    MRN: [id number]  DATE: 2018  TIME: 4:44 PM    Assessment and Plan   Closed fracture of fifth metatarsal bone of left foot, physeal involvement unspecified, initial encounter [S92 352A]  1  Closed fracture of fifth metatarsal bone of left foot, physeal involvement unspecified, initial encounter     2  Acute ankle pain, unspecified laterality  POCT urine HCG    XR foot 3+ vw left    XR ankle 3+ vw left   3  Encounter for pregnancy test with result negative  POCT urine dip   4  Contusion of left ankle, initial encounter           Patient Instructions     Foot fracture  Short leg cast, non weight bearing   Crutches provided    Ankle/ foot contusion  Rest  Ice, elevate  Naprosyn twice daily as needed for pain    Follow up with PCP in 3-5 days  Proceed to  ER if symptoms worsen  Chief Complaint     Chief Complaint   Patient presents with    Leg Injury     Smashed inside of lower left leg with a shovel 2 days ago  Now pain and swelling  History of Present Illness       27 y/o female c/o left ankle/ foot pain after accidentally hitting the inner part of ankle and foot with shovel  Denies trauma to other        Review of Systems   Review of Systems   Constitutional: Negative  Cardiovascular: Negative  Gastrointestinal: Negative  Musculoskeletal: Positive for arthralgias           Current Medications       Current Outpatient Prescriptions:     ondansetron (ZOFRAN-ODT) 4 mg disintegrating tablet, , Disp: , Rfl:     benzonatate (TESSALON) 200 MG capsule, Take 1 capsule (200 mg total) by mouth 3 (three) times a day, Disp: 30 capsule, Rfl: 1    omeprazole (PriLOSEC) 40 MG capsule, Take 40 mg by mouth daily, Disp: , Rfl:     predniSONE 10 mg tablet, Take 1 tablet (10 mg total) by mouth daily, Disp: 30 tablet, Rfl: 0    Current Allergies     Allergies as of 2018    (No Known Allergies)            The following portions of the patient's history were reviewed and updated as appropriate: allergies, current medications, past family history, past medical history, past social history, past surgical history and problem list      Past Medical History:   Diagnosis Date    Foot fracture, right     Metatarsalgia     Self-mutilation     cutting        Past Surgical History:   Procedure Laterality Date    DENTAL SURGERY         Family History   Problem Relation Age of Onset    Bipolar disorder Brother     Gallbladder disease Family     Cancer Mother     No Known Problems Father          Medications have been verified  Objective   /76 (BP Location: Right arm, Patient Position: Sitting, Cuff Size: Standard)   Pulse 76   Temp 98 8 °F (37 1 °C) (Temporal)   Resp 20   Wt 83 8 kg (184 lb 12 8 oz)   SpO2 99%   BMI 28 94 kg/m²        Physical Exam     Physical Exam   Constitutional: She is oriented to person, place, and time  She appears well-nourished  No distress  Cardiovascular: Normal rate, regular rhythm and normal heart sounds  Pulmonary/Chest: Effort normal and breath sounds normal  No respiratory distress  She has no wheezes  Musculoskeletal:        Left ankle: She exhibits no deformity and no laceration  Tenderness  Medial malleolus tenderness found  Feet:    Neurological: She is alert and oriented to person, place, and time  Skin: Skin is warm  She is not diaphoretic         Xray- +fracture of base of 5th metatarsal  Placed on short leg cast, crutches provided   Non weight bearing left leg    Spoke with patient informed her that radiology had read the film as negative

## 2018-06-07 ENCOUNTER — OFFICE VISIT (OUTPATIENT)
Dept: OBGYN CLINIC | Facility: HOSPITAL | Age: 28
End: 2018-06-07
Payer: COMMERCIAL

## 2018-06-07 VITALS
HEART RATE: 66 BPM | WEIGHT: 184.75 LBS | DIASTOLIC BLOOD PRESSURE: 87 MMHG | HEIGHT: 67 IN | SYSTOLIC BLOOD PRESSURE: 130 MMHG | BODY MASS INDEX: 29 KG/M2

## 2018-06-07 DIAGNOSIS — T14.8XXA CONTUSION OF BONE: Primary | ICD-10-CM

## 2018-06-07 DIAGNOSIS — M25.572 PAIN, JOINT, ANKLE AND FOOT, LEFT: ICD-10-CM

## 2018-06-07 PROCEDURE — 99203 OFFICE O/P NEW LOW 30 MIN: CPT | Performed by: PHYSICIAN ASSISTANT

## 2018-06-07 NOTE — PATIENT INSTRUCTIONS
Call or follow up with any new or worsening symptoms as discussed  Ice Pack Application   WHAT YOU NEED TO KNOW:   Ice can be used to decrease swelling and pain after an injury or surgery  Common injuries that may benefit from ice therapy are sprains, strains, and bruises  The use of ice is most effective in the first 1 to 3 days after an injury  DISCHARGE INSTRUCTIONS:   How to apply ice:   · Fill a bag with crushed ice about half full  Remove the air from the bag before you close it  You can also use a bag of frozen vegetables  · Wrap the ice pack in a cloth to protect your skin from frostbite or other injury  · Put the ice over the injured area for 20 to 30 minutes or as long as directed  · Check your skin after about 30 seconds for color changes or blistering  Remove the ice if you notice skin changes or you feel burning or numbness in the area  · Throw the ice pack away after use  · Apply ice to your injured area 4 times each day or as directed  Ask your healthcare provider how many days you should apply ice  Contact your healthcare provider if:   · You see blisters, whitening of your skin, or a bluish color to your skin after using ice  · You feel burning or numbness when using ice  · You have questions about the use of ice packs  © 2017 [address]  Information is for End User's use only and may not be sold, redistributed or otherwise used for commercial purposes  All illustrations and images included in CareNotes® are the copyrighted property of [employer] Inc  or [employer]  The above information is an  only  It is not intended as medical advice for individual conditions or treatments  Talk to your doctor, nurse or pharmacist before following any medical regimen to see if it is safe and effective for you

## 2018-06-07 NOTE — PROGRESS NOTES
Assessment/Plan   Diagnoses and all orders for this visit:    Contusion of bone    Pain, joint, ankle and foot, left          Subjective   Patient ID: Negrito Fox is a 28 y o  female  Vitals:    06/07/18 0857   BP: 130/87   Pulse: 77     26yo female comes in for an evaluation of her left foot and ankle  On Saturday, she was using her foot to use a shovel in her yard  Her foot missed and she "kicked" the shovel with her ankle  She was having medial foot and ankle pain  She was seen in urgent care where xrays showed no acute fractures  Since then, her pain has mostly resolved  The dull, mild, pain does not radiate and is not associated with numbness  She is a patient of Dr Freeman Hargrove for the contralateral ankle and is already scheduled to follow up with him next week  The following portions of the patient's history were reviewed and updated as appropriate: allergies, current medications, past family history, past medical history, past social history, past surgical history and problem list     Review of Systems  Ortho Exam  Past Medical History:   Diagnosis Date    Foot fracture, right     Metatarsalgia     Self-mutilation     cutting      Past Surgical History:   Procedure Laterality Date    DENTAL SURGERY       Family History   Problem Relation Age of Onset    Bipolar disorder Brother     Gallbladder disease Family     Cancer Mother     No Known Problems Father      Social History     Occupational History    employed       Social History Main Topics    Smoking status: Never Smoker    Smokeless tobacco: Never Used    Alcohol use Yes      Comment: occasional - social     Drug use: No    Sexual activity: Not on file       Review of Systems   Constitutional: Negative  HENT: Negative  Eyes: Negative  Respiratory: Negative  Cardiovascular: Negative  Gastrointestinal: Negative  Endocrine: Negative  Genitourinary: Negative  Musculoskeletal: As below  Radha Khalil Allergic/Immunologic: Negative  Neurological: Negative  Hematological: Negative  Psychiatric/Behavioral: Negative  Objective   Physical Exam        I have personally reviewed pertinent films in PACS and my interpretation is no acute fracture  Chronic fx of the base of the 5th MT (Reji De La Fuente confirms this is a known old injury)        · Constitutional: Awake, Alert, Oriented  · Eyes: EOMI  · Psych: Mood and affect appropriate  · Heart: regular rate and rhythm  · Lungs: No audible wheezing  · Abdomen: soft  · Lymph: no lymphedema      Physical exam  · General: Awake, Alert, Oriented  · Eyes: Pupils equal, round and reactive to light  · Heart: regular rate and rhythm  · Lungs: No audible wheezing  · Abdomen: soft       left Ankle:  - Appearance   No swelling, discoloration, deformity, or ecchymosis  - Palpation   No tenderness about the medial / lateral malleoli, deltoid, proximal fibula, atf, cf, ptf, talus, achilles or 5th MT and + mild tenderness of the anteromedial distal tibia  - ROM   Full, pain-free, active ROM  - Special Tests   Negative anterior drawer  - Motor   normal 5/5 in all planes  - NVI distally

## 2018-06-12 ENCOUNTER — HOSPITAL ENCOUNTER (OUTPATIENT)
Dept: RADIOLOGY | Age: 28
Discharge: HOME | End: 2018-06-12
Attending: ORTHOPAEDIC SURGERY
Payer: COMMERCIAL

## 2018-06-12 DIAGNOSIS — S86.301A INJURY OF PERONEAL TENDON OF RIGHT FOOT, INITIAL ENCOUNTER: ICD-10-CM

## 2018-06-12 PROCEDURE — 73721 MRI JNT OF LWR EXTRE W/O DYE: CPT

## 2018-06-15 ENCOUNTER — OFFICE VISIT (OUTPATIENT)
Dept: OBGYN CLINIC | Facility: HOSPITAL | Age: 28
End: 2018-06-15
Payer: COMMERCIAL

## 2018-06-15 VITALS
SYSTOLIC BLOOD PRESSURE: 110 MMHG | DIASTOLIC BLOOD PRESSURE: 71 MMHG | HEIGHT: 67 IN | HEART RATE: 78 BPM | WEIGHT: 185 LBS | BODY MASS INDEX: 29.03 KG/M2

## 2018-06-15 DIAGNOSIS — S93.311A CUBOID SYNDROME OF RIGHT FOOT: ICD-10-CM

## 2018-06-15 DIAGNOSIS — S86.301D INJURY OF PERONEAL TENDON OF RIGHT FOOT, SUBSEQUENT ENCOUNTER: Primary | ICD-10-CM

## 2018-06-15 PROCEDURE — 99212 OFFICE O/P EST SF 10 MIN: CPT | Performed by: ORTHOPAEDIC SURGERY

## 2018-06-15 RX ORDER — METHYLPREDNISOLONE 4 MG/1
TABLET ORAL
Qty: 21 TABLET | Refills: 0 | Status: SHIPPED | OUTPATIENT
Start: 2018-06-15 | End: 2018-08-09 | Stop reason: ALTCHOICE

## 2018-06-15 NOTE — PROGRESS NOTES
Assessment:  1  Injury of peroneal tendon of right foot, subsequent encounter     2  Cuboid syndrome of right foot       Patient Active Problem List   Diagnosis    Abdominal pain, RUQ    Anxiety    Bipolar I disorder, most recent episode depressed, severe without psychotic features ([facility] [address] )    Depression    Insomnia    Pyelonephritis    Acute left flank pain    Chills (without fever)    Other microscopic hematuria    Generalized abdominal pain    Peroneal tendon injury    Contusion of bone    Acute non-recurrent sinusitis    Cough    Contusion of ankle, left    Cuboid syndrome of right foot           Plan      Follow-up in 5 weeks to assess progress            Subjective:     Patient ID:    Chief Complaint:Ulisses Dyer 28 y o  female      HPI    Patient comes in today with regards to her right ankle we sent her for an MRI for suspicion of a peroneal tendon tear  She comes back to review that MRI        The following portions of the patient's history were reviewed and updated as appropriate: allergies, current medications, past family history, past social history, past surgical history and problem list     All organ systems normal    Social History     Social History    Marital status: Single     Spouse name: N/A    Number of children: N/A    Years of education: N/A     Occupational History    employed       Social History Main Topics    Smoking status: Never Smoker    Smokeless tobacco: Never Used    Alcohol use Yes      Comment: occasional - social     Drug use: No    Sexual activity: Not on file     Other Topics Concern    Not on file     Social History Narrative    Exercise habits      Past Medical History:   Diagnosis Date    Foot fracture, right     Metatarsalgia     Self-mutilation     cutting      Past Surgical History:   Procedure Laterality Date    DENTAL SURGERY       No Known Allergies  Current Outpatient Prescriptions on File Prior to Visit   Medication Sig Dispense Refill    benzonatate (TESSALON) 200 MG capsule Take 1 capsule (200 mg total) by mouth 3 (three) times a day 30 capsule 1    omeprazole (PriLOSEC) 40 MG capsule Take 40 mg by mouth daily      ondansetron (ZOFRAN-ODT) 4 mg disintegrating tablet       predniSONE 10 mg tablet Take 1 tablet (10 mg total) by mouth daily 30 tablet 0     No current facility-administered medications on file prior to visit  Objective:        Ortho Exam        I have personally reviewed pertinent films in PACS and my interpretation is Cuboid inflammation near the insertion of the peroneal tendon this may be consistent with stress reaction contusion or friction syndrome  She also has some inflammation on the pronator tendon  Portions of the record may have been created with voice recognition software   Occasional wrong word or "sound a like" substitutions may have occurred due to the inherent limitations of voice recognition software   Read the chart carefully and recognize, using context, where substitutions have occurred

## 2018-06-20 ENCOUNTER — OFFICE VISIT (OUTPATIENT)
Dept: OBGYN CLINIC | Facility: CLINIC | Age: 28
End: 2018-06-20
Payer: COMMERCIAL

## 2018-06-20 VITALS
DIASTOLIC BLOOD PRESSURE: 74 MMHG | HEIGHT: 67 IN | HEART RATE: 59 BPM | BODY MASS INDEX: 29.03 KG/M2 | WEIGHT: 185 LBS | SYSTOLIC BLOOD PRESSURE: 123 MMHG

## 2018-06-20 DIAGNOSIS — S90.02XD CONTUSION OF LEFT ANKLE, SUBSEQUENT ENCOUNTER: ICD-10-CM

## 2018-06-20 DIAGNOSIS — S93.311A CUBOID SYNDROME OF RIGHT FOOT: Primary | ICD-10-CM

## 2018-06-20 DIAGNOSIS — S86.301D INJURY OF PERONEAL TENDON OF RIGHT FOOT, SUBSEQUENT ENCOUNTER: ICD-10-CM

## 2018-06-20 PROCEDURE — 99212 OFFICE O/P EST SF 10 MIN: CPT | Performed by: ORTHOPAEDIC SURGERY

## 2018-06-20 NOTE — PROGRESS NOTES
Assessment:  1  Cuboid syndrome of right foot  Ambulatory referral to Physical Therapy     Patient Active Problem List   Diagnosis    Abdominal pain, RUQ    Anxiety    Bipolar I disorder, most recent episode depressed, severe without psychotic features ([facility] [address] )    Depression    Insomnia    Pyelonephritis    Acute left flank pain    Chills (without fever)    Other microscopic hematuria    Generalized abdominal pain    Peroneal tendon injury    Contusion of bone    Acute non-recurrent sinusitis    Cough    Contusion of ankle, left    Cuboid syndrome of right foot           Plan      Patient will be provided with a note for work and school as well as referral to physical therapy            Subjective:     Patient ID:    Chief Complaint:Ulisses Dyer 28 y o  female      HPI    Patient comes in today basically because she needs a no for school  She is having difficulty with the ability to do therapy and also go to school and work because of the ankle pain   She would like a note for school      The following portions of the patient's history were reviewed and updated as appropriate: allergies, current medications, past family history, past social history, past surgical history and problem list     All organ systems normal    Social History     Social History    Marital status: Single     Spouse name: N/A    Number of children: N/A    Years of education: N/A     Occupational History    employed       Social History Main Topics    Smoking status: Never Smoker    Smokeless tobacco: Never Used    Alcohol use Yes      Comment: occasional - social     Drug use: No    Sexual activity: Not on file     Other Topics Concern    Not on file     Social History Narrative    Exercise habits      Past Medical History:   Diagnosis Date    Foot fracture, right     Metatarsalgia     Self-mutilation     cutting      Past Surgical History:   Procedure Laterality Date    DENTAL SURGERY       No Known Allergies  Current Outpatient Prescriptions on File Prior to Visit   Medication Sig Dispense Refill    benzonatate (TESSALON) 200 MG capsule Take 1 capsule (200 mg total) by mouth 3 (three) times a day 30 capsule 1    Methylprednisolone 4 MG TBPK Use as directed on package 21 tablet 0    omeprazole (PriLOSEC) 40 MG capsule Take 40 mg by mouth daily      ondansetron (ZOFRAN-ODT) 4 mg disintegrating tablet       predniSONE 10 mg tablet Take 1 tablet (10 mg total) by mouth daily 30 tablet 0     No current facility-administered medications on file prior to visit  Objective:        Ortho Exam    Exam unchanged        Portions of the record may have been created with voice recognition software   Occasional wrong word or "sound a like" substitutions may have occurred due to the inherent limitations of voice recognition software   Read the chart carefully and recognize, using context, where substitutions have occurred

## 2018-06-20 NOTE — LETTER
June 20, 2018     Patient: Patrick Tian   YOB: 1990   Date of Visit: 6/20/2018       To Whom it May Concern:    Patrick Tian is under my professional care  She was seen in my office on 6/20/2018  She can return to school and clinicals on 08/08/18  She needs to have eight weeks off to allow healing of the R Ankle  If you have any questions or concerns, please don't hesitate to call           Sincerely,          Katya Portillo DO        CC: No Recipients

## 2018-07-26 ENCOUNTER — OFFICE VISIT (OUTPATIENT)
Dept: OBGYN CLINIC | Facility: MEDICAL CENTER | Age: 28
End: 2018-07-26
Payer: COMMERCIAL

## 2018-07-26 VITALS
SYSTOLIC BLOOD PRESSURE: 106 MMHG | HEIGHT: 67 IN | DIASTOLIC BLOOD PRESSURE: 68 MMHG | WEIGHT: 181 LBS | BODY MASS INDEX: 28.41 KG/M2 | HEART RATE: 60 BPM

## 2018-07-26 DIAGNOSIS — S93.311A CUBOID SYNDROME OF RIGHT FOOT: Primary | ICD-10-CM

## 2018-07-26 PROCEDURE — 99212 OFFICE O/P EST SF 10 MIN: CPT | Performed by: ORTHOPAEDIC SURGERY

## 2018-07-26 NOTE — PROGRESS NOTES
Assessment:  1  Cuboid syndrome of right foot       Patient Active Problem List   Diagnosis    Abdominal pain, RUQ    Anxiety    Bipolar I disorder, most recent episode depressed, severe without psychotic features ([address] )    Depression    Insomnia    Pyelonephritis    Acute left flank pain    Chills (without fever)    Other microscopic hematuria    Generalized abdominal pain    Peroneal tendon injury    Contusion of bone    Acute non-recurrent sinusitis    Cough    Contusion of ankle, left    Cuboid syndrome of right foot           Plan      Activity as tolerated weight-bearing as tolerated we also talked about doing some physical therapy she still sore about the soft tissue she has some mild tenderness on on the cuboid she is using her bone stimulator as well  Subjective:     Patient ID:    Chief Complaint:Ulisses Dyer 28 y o  female      HPI      Patient has a history of a stress reaction in the cuboid bone  It has now been 7 months  She continues to have intermittent pain but it is improving  She had had  To take some time off from work on but is back at work at this time        The following portions of the patient's history were reviewed and updated as appropriate: allergies, current medications, past family history, past social history, past surgical history and problem list     All organ systems normal    Social History     Social History    Marital status: Single     Spouse name: N/A    Number of children: N/A    Years of education: N/A     Occupational History    employed       Social History Main Topics    Smoking status: Never Smoker    Smokeless tobacco: Never Used    Alcohol use Yes      Comment: occasional - social     Drug use: No    Sexual activity: Not on file     Other Topics Concern    Not on file     Social History Narrative    Exercise habits      Past Medical History:   Diagnosis Date    Foot fracture, right     Metatarsalgia     Self-mutilation cutting      Past Surgical History:   Procedure Laterality Date    DENTAL SURGERY       No Known Allergies  Current Outpatient Prescriptions on File Prior to Visit   Medication Sig Dispense Refill    benzonatate (TESSALON) 200 MG capsule Take 1 capsule (200 mg total) by mouth 3 (three) times a day 30 capsule 1    Methylprednisolone 4 MG TBPK Use as directed on package 21 tablet 0    omeprazole (PriLOSEC) 40 MG capsule Take 40 mg by mouth daily      ondansetron (ZOFRAN-ODT) 4 mg disintegrating tablet       predniSONE 10 mg tablet Take 1 tablet (10 mg total) by mouth daily 30 tablet 0     No current facility-administered medications on file prior to visit  Objective:        Ortho Exam    Examination of the foot shows that there is some tenderness on the cuboid there is also some tenderness around the Achilles and calcaneus and peroneal tendon as well  Portions of the record may have been created with voice recognition software   Occasional wrong word or "sound a like" substitutions may have occurred due to the inherent limitations of voice recognition software   Read the chart carefully and recognize, using context, where substitutions have occurred

## 2018-08-09 ENCOUNTER — OFFICE VISIT (OUTPATIENT)
Dept: FAMILY MEDICINE CLINIC | Facility: CLINIC | Age: 28
End: 2018-08-09
Payer: COMMERCIAL

## 2018-08-09 VITALS
BODY MASS INDEX: 28.56 KG/M2 | HEIGHT: 67 IN | WEIGHT: 182 LBS | HEART RATE: 60 BPM | DIASTOLIC BLOOD PRESSURE: 60 MMHG | SYSTOLIC BLOOD PRESSURE: 104 MMHG

## 2018-08-09 DIAGNOSIS — F41.9 ANXIETY: Primary | ICD-10-CM

## 2018-08-09 DIAGNOSIS — Z12.4 PAP SMEAR FOR CERVICAL CANCER SCREENING: ICD-10-CM

## 2018-08-09 DIAGNOSIS — Z02.89 ENCOUNTER FOR PHYSICAL EXAMINATION RELATED TO EMPLOYMENT: ICD-10-CM

## 2018-08-09 PROBLEM — R05.9 COUGH: Status: RESOLVED | Noted: 2018-03-16 | Resolved: 2018-08-09

## 2018-08-09 PROBLEM — J01.90 ACUTE NON-RECURRENT SINUSITIS: Status: RESOLVED | Noted: 2018-03-12 | Resolved: 2018-08-09

## 2018-08-09 PROBLEM — R10.9 ACUTE LEFT FLANK PAIN: Status: RESOLVED | Noted: 2018-01-31 | Resolved: 2018-08-09

## 2018-08-09 PROBLEM — S90.02XA CONTUSION OF ANKLE, LEFT: Status: RESOLVED | Noted: 2018-06-04 | Resolved: 2018-08-09

## 2018-08-09 PROBLEM — R10.84 GENERALIZED ABDOMINAL PAIN: Status: RESOLVED | Noted: 2018-02-02 | Resolved: 2018-08-09

## 2018-08-09 PROBLEM — S86.309A PERONEAL TENDON INJURY: Status: RESOLVED | Noted: 2018-02-22 | Resolved: 2018-08-09

## 2018-08-09 PROBLEM — T14.8XXA CONTUSION OF BONE: Status: RESOLVED | Noted: 2018-03-01 | Resolved: 2018-08-09

## 2018-08-09 PROBLEM — R68.83 CHILLS (WITHOUT FEVER): Status: RESOLVED | Noted: 2018-01-31 | Resolved: 2018-08-09

## 2018-08-09 PROCEDURE — 99214 OFFICE O/P EST MOD 30 MIN: CPT | Performed by: FAMILY MEDICINE

## 2018-08-09 PROCEDURE — 3008F BODY MASS INDEX DOCD: CPT | Performed by: FAMILY MEDICINE

## 2018-08-09 RX ORDER — SERTRALINE HYDROCHLORIDE 25 MG/1
25 TABLET, FILM COATED ORAL DAILY
Qty: 90 TABLET | Refills: 1 | Status: SHIPPED | OUTPATIENT
Start: 2018-08-09 | End: 2018-10-10

## 2018-08-09 NOTE — ASSESSMENT & PLAN NOTE
Physical exam is within normal limits and lab work is ordered especially a hemoglobin A1c and lipid panel for her employment  She will be called with those lab results

## 2018-08-09 NOTE — ASSESSMENT & PLAN NOTE
Patient is having problems with some concentration and anxiety especially related to her work and would like to return to taking Zoloft and will be put on 25 mg daily  She has no depression or suicidal ideation or plan

## 2018-08-09 NOTE — ASSESSMENT & PLAN NOTE
Intermittent abdominal pain which could be related to gallbladder  She has had a full workup for this problem and may need to have her gallbladder removed in the future

## 2018-08-09 NOTE — PROGRESS NOTES
Assessment/Plan:    Anxiety  Patient is having problems with some concentration and anxiety especially related to her work and would like to return to taking Zoloft and will be put on 25 mg daily  She has no depression or suicidal ideation or plan  Encounter for physical examination related to employment  Physical exam is within normal limits and lab work is ordered especially a hemoglobin A1c and lipid panel for her employment  She will be called with those lab results  Abdominal pain, RUQ  Intermittent abdominal pain which could be related to gallbladder  She has had a full workup for this problem and may need to have her gallbladder removed in the future  Diagnoses and all orders for this visit:    Anxiety  -     sertraline (ZOLOFT) 25 mg tablet; Take 1 tablet (25 mg total) by mouth daily  -     CBC and differential; Future  -     Comprehensive metabolic panel; Future  -     Lipid Panel with Direct LDL reflex; Future  -     Hemoglobin A1C; Future  -     TSH, 3rd generation; Future    Encounter for physical examination related to employment    Pap smear for cervical cancer screening  -     Ambulatory referral to Gynecology; Future  -     CBC and differential; Future  -     Comprehensive metabolic panel; Future  -     Lipid Panel with Direct LDL reflex; Future  -     Hemoglobin A1C; Future  -     TSH, 3rd generation; Future          Subjective:   Pt has concerns regarding frequent fractures in right ankle  Requests     -   [facility]     Patient ID: Deepak Schreiber is a 33 y o  female  This is a 69-year-old female who comes in for a physical exam for employment  She has recurring problems with her right ankle in that she slipped in the garage and tore a tendon in that ankle and now keeps fracturing her cuboid bone  She is seeing Orthopedics for this problem  She is also having a problem with some anxiety and concentration issues and in the past had been on Abilify and Zoloft    She did not like the Abilify but the Zoloft seem to help and she would like to return to taking the Zoloft  Her blood pressure is 104/60 and her weight is 182 lb up 1 lb from the previous visit  The following portions of the patient's history were reviewed and updated as appropriate: allergies, current medications, past family history, past medical history, past social history, past surgical history and problem list     Review of Systems   Constitutional: Negative  HENT: Negative  Eyes: Negative  Respiratory: Negative  Cardiovascular: Negative  Gastrointestinal: Negative  Endocrine: Negative  Genitourinary: Negative  Musculoskeletal: Negative  Skin: Negative  Allergic/Immunologic: Negative  Neurological: Negative  Hematological: Negative  Psychiatric/Behavioral: Negative  Objective:      /60 (BP Location: Left arm, Patient Position: Sitting, Cuff Size: Large)   Pulse 60   Ht 5' 6 5" (1 689 m)   Wt 82 6 kg (182 lb)   BMI 28 94 kg/m²          Physical Exam   Constitutional: She is oriented to person, place, and time  She appears well-developed and well-nourished  HENT:   Head: Normocephalic  Right Ear: External ear normal    Left Ear: External ear normal    Mouth/Throat: Oropharynx is clear and moist    Eyes: Conjunctivae and EOM are normal  Pupils are equal, round, and reactive to light  Neck: Normal range of motion  Neck supple  Cardiovascular: Normal rate, regular rhythm and normal heart sounds  Pulmonary/Chest: Effort normal and breath sounds normal    Abdominal: Soft  Bowel sounds are normal    Musculoskeletal: Normal range of motion  Neurological: She is alert and oriented to person, place, and time  Skin: Skin is warm  Psychiatric: She has a normal mood and affect  Her behavior is normal  Judgment and thought content normal    Nursing note and vitals reviewed

## 2018-08-31 ENCOUNTER — APPOINTMENT (OUTPATIENT)
Dept: LAB | Facility: CLINIC | Age: 28
End: 2018-08-31
Payer: COMMERCIAL

## 2018-08-31 ENCOUNTER — TRANSCRIBE ORDERS (OUTPATIENT)
Dept: LAB | Facility: CLINIC | Age: 28
End: 2018-08-31

## 2018-08-31 DIAGNOSIS — Z00.8 HEALTH EXAMINATION IN POPULATION SURVEY: Primary | ICD-10-CM

## 2018-08-31 DIAGNOSIS — F41.9 ANXIETY: ICD-10-CM

## 2018-08-31 DIAGNOSIS — Z12.4 PAP SMEAR FOR CERVICAL CANCER SCREENING: ICD-10-CM

## 2018-08-31 LAB
ALBUMIN SERPL BCP-MCNC: 3.6 G/DL (ref 3.5–5)
ALP SERPL-CCNC: 53 U/L (ref 46–116)
ALT SERPL W P-5'-P-CCNC: 16 U/L (ref 12–78)
ANION GAP SERPL CALCULATED.3IONS-SCNC: 4 MMOL/L (ref 4–13)
AST SERPL W P-5'-P-CCNC: 14 U/L (ref 5–45)
BASOPHILS # BLD AUTO: 0.02 THOUSANDS/ΜL (ref 0–0.1)
BASOPHILS NFR BLD AUTO: 0 % (ref 0–1)
BILIRUB SERPL-MCNC: 0.55 MG/DL (ref 0.2–1)
BUN SERPL-MCNC: 15 MG/DL (ref 5–25)
CALCIUM SERPL-MCNC: 8.7 MG/DL (ref 8.3–10.1)
CHLORIDE SERPL-SCNC: 103 MMOL/L (ref 100–108)
CHOLEST SERPL-MCNC: 145 MG/DL (ref 50–200)
CHOLEST SERPL-MCNC: 146 MG/DL (ref 50–200)
CO2 SERPL-SCNC: 29 MMOL/L (ref 21–32)
CREAT SERPL-MCNC: 0.75 MG/DL (ref 0.6–1.3)
EOSINOPHIL # BLD AUTO: 0.06 THOUSAND/ΜL (ref 0–0.61)
EOSINOPHIL NFR BLD AUTO: 1 % (ref 0–6)
ERYTHROCYTE [DISTWIDTH] IN BLOOD BY AUTOMATED COUNT: 12.4 % (ref 11.6–15.1)
EST. AVERAGE GLUCOSE BLD GHB EST-MCNC: 103 MG/DL
EST. AVERAGE GLUCOSE BLD GHB EST-MCNC: 108 MG/DL
GFR SERPL CREATININE-BSD FRML MDRD: 108 ML/MIN/1.73SQ M
GLUCOSE P FAST SERPL-MCNC: 87 MG/DL (ref 65–99)
HBA1C MFR BLD: 5.2 % (ref 4.2–6.3)
HBA1C MFR BLD: 5.4 % (ref 4.2–6.3)
HCT VFR BLD AUTO: 38.9 % (ref 34.8–46.1)
HDLC SERPL-MCNC: 59 MG/DL (ref 40–60)
HDLC SERPL-MCNC: 60 MG/DL (ref 40–60)
HGB BLD-MCNC: 12.4 G/DL (ref 11.5–15.4)
IMM GRANULOCYTES # BLD AUTO: 0.01 THOUSAND/UL (ref 0–0.2)
IMM GRANULOCYTES NFR BLD AUTO: 0 % (ref 0–2)
LDLC SERPL CALC-MCNC: 79 MG/DL (ref 0–100)
LDLC SERPL CALC-MCNC: 79 MG/DL (ref 0–100)
LYMPHOCYTES # BLD AUTO: 1.95 THOUSANDS/ΜL (ref 0.6–4.47)
LYMPHOCYTES NFR BLD AUTO: 35 % (ref 14–44)
MCH RBC QN AUTO: 28.4 PG (ref 26.8–34.3)
MCHC RBC AUTO-ENTMCNC: 31.9 G/DL (ref 31.4–37.4)
MCV RBC AUTO: 89 FL (ref 82–98)
MONOCYTES # BLD AUTO: 0.43 THOUSAND/ΜL (ref 0.17–1.22)
MONOCYTES NFR BLD AUTO: 8 % (ref 4–12)
NEUTROPHILS # BLD AUTO: 3.19 THOUSANDS/ΜL (ref 1.85–7.62)
NEUTS SEG NFR BLD AUTO: 56 % (ref 43–75)
NONHDLC SERPL-MCNC: 86 MG/DL
NRBC BLD AUTO-RTO: 0 /100 WBCS
PLATELET # BLD AUTO: 205 THOUSANDS/UL (ref 149–390)
PMV BLD AUTO: 11.6 FL (ref 8.9–12.7)
POTASSIUM SERPL-SCNC: 3.9 MMOL/L (ref 3.5–5.3)
PROT SERPL-MCNC: 7.2 G/DL (ref 6.4–8.2)
RBC # BLD AUTO: 4.37 MILLION/UL (ref 3.81–5.12)
SODIUM SERPL-SCNC: 136 MMOL/L (ref 136–145)
TRIGL SERPL-MCNC: 36 MG/DL
TRIGL SERPL-MCNC: 37 MG/DL
TSH SERPL DL<=0.05 MIU/L-ACNC: 1.45 UIU/ML (ref 0.36–3.74)
WBC # BLD AUTO: 5.66 THOUSAND/UL (ref 4.31–10.16)

## 2018-08-31 PROCEDURE — 80053 COMPREHEN METABOLIC PANEL: CPT

## 2018-08-31 PROCEDURE — 83036 HEMOGLOBIN GLYCOSYLATED A1C: CPT

## 2018-08-31 PROCEDURE — 85025 COMPLETE CBC W/AUTO DIFF WBC: CPT

## 2018-08-31 PROCEDURE — 80061 LIPID PANEL: CPT

## 2018-08-31 PROCEDURE — 84443 ASSAY THYROID STIM HORMONE: CPT

## 2018-08-31 PROCEDURE — 36415 COLL VENOUS BLD VENIPUNCTURE: CPT

## 2018-10-10 ENCOUNTER — OFFICE VISIT (OUTPATIENT)
Dept: FAMILY MEDICINE CLINIC | Facility: CLINIC | Age: 28
End: 2018-10-10
Payer: COMMERCIAL

## 2018-10-10 VITALS
DIASTOLIC BLOOD PRESSURE: 70 MMHG | HEART RATE: 68 BPM | SYSTOLIC BLOOD PRESSURE: 112 MMHG | BODY MASS INDEX: 29.89 KG/M2 | WEIGHT: 188 LBS | TEMPERATURE: 98 F

## 2018-10-10 DIAGNOSIS — F41.9 ANXIETY: ICD-10-CM

## 2018-10-10 DIAGNOSIS — J01.10 ACUTE NON-RECURRENT FRONTAL SINUSITIS: Primary | ICD-10-CM

## 2018-10-10 PROCEDURE — 99214 OFFICE O/P EST MOD 30 MIN: CPT | Performed by: PHYSICIAN ASSISTANT

## 2018-10-10 RX ORDER — SULFAMETHOXAZOLE AND TRIMETHOPRIM 800; 160 MG/1; MG/1
1 TABLET ORAL EVERY 12 HOURS SCHEDULED
Qty: 20 TABLET | Refills: 0 | Status: SHIPPED | OUTPATIENT
Start: 2018-10-10 | End: 2018-10-20

## 2018-10-10 RX ORDER — ESCITALOPRAM OXALATE 10 MG/1
10 TABLET ORAL DAILY
Qty: 30 TABLET | Refills: 0 | Status: SHIPPED | OUTPATIENT
Start: 2018-10-10 | End: 2018-10-25 | Stop reason: SDDI

## 2018-10-10 NOTE — PATIENT INSTRUCTIONS
Problem List Items Addressed This Visit        Respiratory    Acute non-recurrent frontal sinusitis - Primary     Pt is already s/p augmentin so will change to bactrim as directed  Increase fluids  Coricidin as directed and may try sinus rinsing  Relevant Medications    sulfamethoxazole-trimethoprim (BACTRIM DS) 800-160 mg per tablet       Other    Anxiety     Zoloft giving pt headaches and does not seem to help her focus  Would like to try to stop but will give paper Rx for lexapro 10 mg once daily to start  Would need one month f/u then from start of med            Relevant Medications    escitalopram (LEXAPRO) 10 mg tablet

## 2018-10-10 NOTE — ASSESSMENT & PLAN NOTE
Zoloft giving pt headaches and does not seem to help her focus  Would like to try to stop but will give paper Rx for lexapro 10 mg once daily to start  Would need one month f/u then from start of med

## 2018-10-10 NOTE — PROGRESS NOTES
Assessment/Plan:    Acute non-recurrent frontal sinusitis  Pt is already s/p augmentin so will change to bactrim as directed  Increase fluids  Anxiety  Zoloft giving pt headaches and does not seem to help her focus  Would like to try to stop but will give paper Rx for lexapro 10 mg once daily to start  Would need one month f/u then from start of med  Diagnoses and all orders for this visit:    Acute non-recurrent frontal sinusitis  -     sulfamethoxazole-trimethoprim (BACTRIM DS) 800-160 mg per tablet; Take 1 tablet by mouth every 12 (twelve) hours for 10 days    Anxiety          Subjective:   CC: c/o sore throat, fevers at night and ear pain  Pt  Was given Augmentin for strep and finished medication a week ago, sx remain  Pt  Was started on Zoloft in Aug and has been getting headaches since starting medication  Patient ID: Dereje Son is a 33 y o  female  Patient states that more than 2 weeks ago she was given Augmentin for a proposed strep infection by a co-worker which made her symptoms slightly worse however she continues to have bilateral ear pain and sore throat and daily headaches  She states that she is prone to getting strep more than once a year and does need her tonsils out  She cannot take decongestants as elevates her blood pressure even though she does have a diagnosis of hypertension so usually takes Coricidin as directed  She states that since she started the Zoloft she has been having headaches and needs to take 2 Tylenol twice daily  She states that the Zoloft was started to 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 however does not seem to be doing too much and she is not sure if it is worth daily headaches and needing to take Tylenol often  She is willing to stop medication to see if her headaches resolve          The following portions of the patient's history were reviewed and updated as appropriate: allergies, current medications, past family history, past medical history, past social history, past surgical history and problem list     Review of Systems   Constitutional: Positive for fever  HENT: Positive for ear pain, postnasal drip, rhinorrhea and sore throat  Neurological: Positive for headaches  Objective:      Vitals:    10/10/18 0816   BP: 112/70   BP Location: Left arm   Patient Position: Sitting   Pulse: 68   Temp: 98 °F (36 7 °C)   TempSrc: Tympanic   Weight: 85 3 kg (188 lb)            Physical Exam   Constitutional: She is oriented to person, place, and time  She appears well-developed and well-nourished  No distress  HENT:   Head: Normocephalic and atraumatic  Right Ear: Hearing, tympanic membrane, external ear and ear canal normal    Left Ear: Hearing, tympanic membrane, external ear and ear canal normal    Nose: Nose normal    Mouth/Throat: Uvula is midline  No oropharyngeal exudate, posterior oropharyngeal edema or posterior oropharyngeal erythema  Enlarged tonsils with multiple scarring without erythema or edema or exudate  Eyes: Conjunctivae are normal  Right eye exhibits no discharge  Left eye exhibits no discharge  Neck: Neck supple  Carotid bruit is not present  Cardiovascular: Normal rate, regular rhythm and normal heart sounds  Exam reveals no gallop and no friction rub  No murmur heard  Pulmonary/Chest: Effort normal and breath sounds normal  No respiratory distress  She has no wheezes  She has no rales  Neurological: She is alert and oriented to person, place, and time  Skin: Skin is warm and dry  She is not diaphoretic  Psychiatric: She has a normal mood and affect  Judgment normal    Nursing note and vitals reviewed

## 2018-10-25 ENCOUNTER — OFFICE VISIT (OUTPATIENT)
Dept: FAMILY MEDICINE CLINIC | Facility: CLINIC | Age: 28
End: 2018-10-25
Payer: COMMERCIAL

## 2018-10-25 VITALS
DIASTOLIC BLOOD PRESSURE: 64 MMHG | HEIGHT: 67 IN | TEMPERATURE: 98.5 F | BODY MASS INDEX: 28.72 KG/M2 | SYSTOLIC BLOOD PRESSURE: 100 MMHG | HEART RATE: 80 BPM | WEIGHT: 183 LBS

## 2018-10-25 DIAGNOSIS — J06.9 VIRAL UPPER RESPIRATORY TRACT INFECTION: Primary | ICD-10-CM

## 2018-10-25 PROCEDURE — 3008F BODY MASS INDEX DOCD: CPT | Performed by: FAMILY MEDICINE

## 2018-10-25 PROCEDURE — 99213 OFFICE O/P EST LOW 20 MIN: CPT | Performed by: FAMILY MEDICINE

## 2018-10-25 PROCEDURE — 1036F TOBACCO NON-USER: CPT | Performed by: FAMILY MEDICINE

## 2018-10-25 NOTE — ASSESSMENT & PLAN NOTE
Since she has been on 2 antibiotics already it was decided to put her on prednisone 10 mg tablets: Five tablets for 2 days, 4 tablets for 3 days, 3 tablets for 3 days, 2 tablets for 2 days and 1 tablet for 1 day  She will also take Coricidin HBP/cold and flu 1 tablet twice a day and 2 tablets at bedtime

## 2018-10-25 NOTE — PROGRESS NOTES
Assessment/Plan:    No problem-specific Assessment & Plan notes found for this encounter  There are no diagnoses linked to this encounter  Subjective:   Pt still has sore throat  Ears bothersome  She has completed course of Bactrim and Augmentin but symptoms are still present  Pt also notes she discontinued Zoloft 2 weeks ago as she thought that might be the cause of her headaches  -  [facility]     Patient ID: Timothy Waldron is a 33 y o  female  This is is a 58-year-old female who comes in with continuing sore throat symptoms, head congestion and postnasal drip  She was on a course of Augmentin for strep throat and did not get any better and was placed on Bactrim for 10 days  She denies any nausea, vomiting or diarrhea  Her blood pressure is 100/64 and her temperature is 98 5°  The following portions of the patient's history were reviewed and updated as appropriate: allergies, current medications, past family history, past medical history, past social history, past surgical history and problem list     Review of Systems   Constitutional: Negative  Negative for fatigue and fever  HENT: Positive for congestion, postnasal drip and sore throat  Negative for ear pain, rhinorrhea and sinus pressure  Eyes: Negative  Respiratory: Negative  Negative for cough  Cardiovascular: Negative  Negative for chest pain  Gastrointestinal: Negative  Negative for diarrhea, nausea and vomiting  Endocrine: Negative  Genitourinary: Negative  Musculoskeletal: Negative  Skin: Negative  Allergic/Immunologic: Negative  Neurological: Negative  Hematological: Negative  Psychiatric/Behavioral: Negative            Objective:      /64 (BP Location: Left arm, Patient Position: Sitting, Cuff Size: Large)   Pulse 80   Temp 98 5 °F (36 9 °C) (Oral)   Ht 5' 6 5" (1 689 m)   Wt 83 kg (183 lb)   BMI 29 09 kg/m²          Physical Exam   Constitutional: She is oriented to person, place, and time  She appears well-developed and well-nourished  HENT:   Head: Normocephalic  Right Ear: External ear normal    Left Ear: External ear normal    Positive postnasal drip, no erythema of posterior pharynx and no exudates  Right tonsil is enlarged with hypertrophy   Eyes: Pupils are equal, round, and reactive to light  Conjunctivae and EOM are normal    Neck: Normal range of motion  Neck supple  Cardiovascular: Normal rate, regular rhythm and normal heart sounds  Pulmonary/Chest: Effort normal and breath sounds normal  She has no wheezes  She has no rales  Abdominal: Soft  Bowel sounds are normal    Musculoskeletal: Normal range of motion  Lymphadenopathy:     She has no cervical adenopathy  Neurological: She is alert and oriented to person, place, and time  Skin: Skin is warm  Psychiatric: She has a normal mood and affect   Her behavior is normal  Judgment and thought content normal

## 2018-11-08 ENCOUNTER — TELEPHONE (OUTPATIENT)
Dept: FAMILY MEDICINE CLINIC | Facility: CLINIC | Age: 28
End: 2018-11-08

## 2018-11-08 NOTE — TELEPHONE ENCOUNTER
----- Message from Rebeca Hollins LPN sent at 15/3/5044  7:36 AM EST -----  Regarding: FW: Prescription Question  Contact: [phone]  Please address  ----- Message -----  From: Alissa Frank  Sent: 11/7/2018   4:58 PM  To: [facility]  [facility]  Subject: Prescription Question                            Could I have a refill of zofran? I've had stomach bug for 2 days and have ran out      Thanks   [employer]

## 2018-12-02 ENCOUNTER — OFFICE VISIT (OUTPATIENT)
Dept: URGENT CARE | Facility: MEDICAL CENTER | Age: 28
End: 2018-12-02
Payer: COMMERCIAL

## 2018-12-02 VITALS
DIASTOLIC BLOOD PRESSURE: 79 MMHG | HEIGHT: 66 IN | HEART RATE: 69 BPM | RESPIRATION RATE: 16 BRPM | BODY MASS INDEX: 29.41 KG/M2 | SYSTOLIC BLOOD PRESSURE: 115 MMHG | TEMPERATURE: 98.7 F | WEIGHT: 183 LBS

## 2018-12-02 DIAGNOSIS — J02.9 ACUTE PHARYNGITIS, UNSPECIFIED ETIOLOGY: Primary | ICD-10-CM

## 2018-12-02 LAB — S PYO AG THROAT QL: NEGATIVE

## 2018-12-02 PROCEDURE — 99213 OFFICE O/P EST LOW 20 MIN: CPT | Performed by: PHYSICIAN ASSISTANT

## 2018-12-02 PROCEDURE — S9088 SERVICES PROVIDED IN URGENT: HCPCS | Performed by: PHYSICIAN ASSISTANT

## 2018-12-02 NOTE — PROGRESS NOTES
[facility]        NAME: Emmy Rosen is a 33 y o  female  : 1990    MRN: [id number]  DATE: 2018  TIME: 4:57 PM    Assessment and Plan   Acute pharyngitis, unspecified etiology [J02 9]  1  Acute pharyngitis, unspecified etiology  POCT rapid strepA         Patient Instructions     1  Motrin as needed for throat pain  2  Increase fluids  3  Follow up with PCP in 3-5 days if symptoms worsen  Chief Complaint     Chief Complaint   Patient presents with    Sore Throat     started yesterday, h/a ear pain, congestion         History of Present Illness       The patient is a 58-year-old female presents with a 2 day history of sore throat, nasal congestion, nausea and postnasal drip  She denies any fever, chills or body aches since the onset of her symptoms  Review of Systems   Review of Systems   Constitutional: Negative  HENT: Positive for congestion, postnasal drip, rhinorrhea and sore throat  Respiratory: Negative for cough  Gastrointestinal: Positive for nausea  Negative for diarrhea and vomiting           Current Medications       Current Outpatient Prescriptions:     ondansetron (ZOFRAN-ODT) 4 mg disintegrating tablet, , Disp: , Rfl:     ondansetron (ZOFRAN-ODT) 4 mg disintegrating tablet, Take 1 tablet (4 mg total) by mouth every 6 (six) hours as needed for nausea or vomiting, Disp: 20 tablet, Rfl: 0    Current Allergies     Allergies as of 2018    (No Known Allergies)            The following portions of the patient's history were reviewed and updated as appropriate: allergies, current medications, past family history, past medical history, past social history, past surgical history and problem list      Past Medical History:   Diagnosis Date    Foot fracture, right     Metatarsalgia     Self-mutilation     cutting        Past Surgical History:   Procedure Laterality Date    DENTAL SURGERY         Family History   Problem Relation Age of Onset    Bipolar disorder Brother     Gallbladder disease Family     Cancer Mother     No Known Problems Father          Medications have been verified  Objective   /79 (Patient Position: Sitting)   Pulse 69   Temp 98 7 °F (37 1 °C) (Tympanic)   Resp 16   Ht 5' 6" (1 676 m)   Wt 83 kg (183 lb)   BMI 29 54 kg/m²        Physical Exam     Physical Exam   Constitutional: She appears well-developed and well-nourished  No distress  HENT:   Head: Normocephalic and atraumatic  Right Ear: Tympanic membrane and ear canal normal    Left Ear: Tympanic membrane and ear canal normal    Nose: Rhinorrhea present  Mouth/Throat: Uvula is midline  Posterior oropharyngeal erythema present  No posterior oropharyngeal edema  Cardiovascular: Normal rate, regular rhythm and normal heart sounds  No murmur heard    Pulmonary/Chest: Effort normal and breath sounds normal

## 2018-12-02 NOTE — PATIENT INSTRUCTIONS
1  Motrin as needed for throat pain  2  Increase fluids  3  Follow up with PCP in 3-5 days if symptoms worsen

## 2018-12-04 ENCOUNTER — TELEPHONE (OUTPATIENT)
Dept: URGENT CARE | Facility: MEDICAL CENTER | Age: 28
End: 2018-12-04

## 2018-12-04 DIAGNOSIS — B96.89 ACUTE BACTERIAL SINUSITIS: Primary | ICD-10-CM

## 2018-12-04 DIAGNOSIS — J01.90 ACUTE BACTERIAL SINUSITIS: Primary | ICD-10-CM

## 2018-12-04 RX ORDER — AZITHROMYCIN 250 MG/1
TABLET, FILM COATED ORAL
Qty: 6 TABLET | Refills: 0 | Status: SHIPPED | OUTPATIENT
Start: 2018-12-04 | End: 2018-12-08

## 2018-12-04 RX ORDER — PREDNISONE 50 MG/1
50 TABLET ORAL DAILY
Qty: 5 TABLET | Refills: 0 | Status: SHIPPED | OUTPATIENT
Start: 2018-12-04 | End: 2018-12-09

## 2018-12-04 NOTE — TELEPHONE ENCOUNTER
Patient went to [facility] 2 days ago with cold like symptoms  Her symptoms have significantly increased and she continues to have fevers with severe sinus pain and pressure R>L  Positive dizziness, positive headache  Prescription sent to pharmacy for Azithromycin (PCN allergy) and prednisone 50mg x 5 days  I also advised her to start saline nasal spray and Flonase

## 2018-12-14 ENCOUNTER — OFFICE VISIT (OUTPATIENT)
Dept: OBGYN CLINIC | Facility: CLINIC | Age: 28
End: 2018-12-14
Payer: COMMERCIAL

## 2018-12-14 VITALS — HEART RATE: 71 BPM | DIASTOLIC BLOOD PRESSURE: 75 MMHG | SYSTOLIC BLOOD PRESSURE: 109 MMHG

## 2018-12-14 DIAGNOSIS — S93.311A CUBOID SYNDROME OF RIGHT FOOT: ICD-10-CM

## 2018-12-14 DIAGNOSIS — R11.0 NAUSEA: ICD-10-CM

## 2018-12-14 DIAGNOSIS — M25.571 PAIN IN LATERAL PORTION OF RIGHT ANKLE: ICD-10-CM

## 2018-12-14 DIAGNOSIS — M79.671 PAIN IN RIGHT FOOT: Primary | ICD-10-CM

## 2018-12-14 PROCEDURE — 99213 OFFICE O/P EST LOW 20 MIN: CPT | Performed by: ORTHOPAEDIC SURGERY

## 2018-12-14 RX ORDER — AMOXICILLIN AND CLAVULANATE POTASSIUM 875; 125 MG/1; MG/1
TABLET, FILM COATED ORAL
Refills: 0 | COMMUNITY
Start: 2018-09-26 | End: 2019-03-07

## 2018-12-14 RX ORDER — METHOCARBAMOL 500 MG/1
500 TABLET, FILM COATED ORAL EVERY 6 HOURS PRN
Refills: 1 | COMMUNITY
Start: 2018-09-15 | End: 2019-03-07

## 2018-12-14 NOTE — PROGRESS NOTES
Assessment/Plan:  1  Pain in right foot  MRI ankle/heel right  wo contrast   2  Cuboid syndrome of right foot  MRI ankle/heel right  wo contrast   3  Pain in lateral portion of right ankle  MRI ankle/heel right  wo contrast     Patient Active Problem List   Diagnosis    Abdominal pain, RUQ    Anxiety    Pyelonephritis    Other microscopic hematuria    Acute non-recurrent frontal sinusitis    Cuboid syndrome of right foot    Encounter for physical examination related to employment    Viral upper respiratory tract infection    Pain in right foot    Pain in lateral portion of right ankle       Discussion/Summary:    33 y o  female  With right ankle  And foot pain secondary to peroneus  Tendinitis, cuboid syndrome  She does have significant laxity with anterior drawer as compared to the left foot  It has is been over 6 months since recent MRI, we will have her re-evaluated with another MRI to determine foot area still demonstrate  Inflammation or edema  She will return after testing,   Based on the MRI, we will consider putting her back into Cam walker boot  At this time, she would prefer to continue with taping of the foot  And ankle rather than the boot  She may have a little bit of plantar fasciitis as well and if this does seem to be symptomatic for her at her next follow-up will make recommendations for conservative care     The assessment and plan were formulated by Dr Lacie Arnold and I assisted in carrying it out  Follow Up: After Testing    Subjective:   Patient ID: Izetta Curling is a 33 y o  female   HPI    Patient presents to the office for follow up of   Right foot and ankle pain  Since the last visit, the patient reports  At the pain was improving however about a month ago the pain did worsen again  Patient  denies any new injuries to the  Right foot or ankle since the last visit  She denies any numbness or tingling    She reports that she still has been using some tape around the ankle which does slightly for the pain  She says the pain is still same location and character  The following portions of the patient's history were reviewed and updated as appropriate: allergies, current medications, past family history, past social history, past surgical history and problem list     Social History     Social History    Marital status: Single     Spouse name: N/A    Number of children: N/A    Years of education: N/A     Occupational History    employed       Social History Main Topics    Smoking status: Never Smoker    Smokeless tobacco: Never Used    Alcohol use Yes      Comment: occasional - social     Drug use: No    Sexual activity: Not on file     Other Topics Concern    Not on file     Social History Narrative    Exercise habits      Past Medical History:   Diagnosis Date    Foot fracture, right     Metatarsalgia     Self-mutilation     cutting      Past Surgical History:   Procedure Laterality Date    DENTAL SURGERY       No Known Allergies  Current Outpatient Prescriptions on File Prior to Visit   Medication Sig Dispense Refill    ondansetron (ZOFRAN-ODT) 4 mg disintegrating tablet       ondansetron (ZOFRAN-ODT) 4 mg disintegrating tablet Take 1 tablet (4 mg total) by mouth every 6 (six) hours as needed for nausea or vomiting 20 tablet 0     No current facility-administered medications on file prior to visit  Review of Systems - Negative except as noted in HPI        Objective:    Vitals:    12/14/18 1422   BP: 109/75   Pulse: 71       Physical Exam   Constitutional: She is oriented to person, place, and time  She appears well-developed and well-nourished  HENT:   Head: Normocephalic and atraumatic  Eyes: Conjunctivae are normal  No scleral icterus  Neck: Neck supple  Pulmonary/Chest: Effort normal  No stridor  No respiratory distress  Abdominal: Soft  She exhibits no distension  Neurological: She is alert and oriented to person, place, and time     Skin: Skin is warm and dry  No erythema  Psychiatric: She has a normal mood and affect  Her behavior is normal        Right Ankle Exam   Swelling: none    Tenderness   The patient is experiencing tenderness in the ATF (  Tender over the proximal plantar fasciia,  tenderness along the distal peroneus tendon as well as tenderness over the cuboid region)  Range of Motion   Dorsiflexion: normal   Plantar flexion: normal   Inversion: normal   Eversion: normal     Muscle Strength   Right ankle normal muscle strength: EHL and FHL 5/5 strength  Dorsiflexion:  5/5  Plantar flexion:  5/5  Anterior tibial:  5/5  Posterior tibial:  5/5  Gastrocsoleus:  5/5  Peroneal muscle:  5/5    Tests   Anterior drawer: 2+  Varus tilt: negative    Other   Erythema: absent  Scars: absent  Sensation: normal  Right ankle pulse absent: 2+ DP pulse  Comments:   No ecchymosis            I have personally reviewed pertinent films in PACS  Procedures  No Procedures performed today    Portions of the record may have been created with voice recognition software   Occasional wrong word or "sound a like" substitutions may have occurred due to the inherent limitations of voice recognition software   Read the chart carefully and recognize, using context, where substitutions have occurred

## 2018-12-14 NOTE — TELEPHONE ENCOUNTER
From: Izetta Curling  Sent: 12/14/2018 8:42 AM EST  Subject: Medication Renewal Request    Izetta Curling would like a refill of the following medications:     ondansetron (ZOFRAN-ODT) 4 mg disintegrating tablet JOSE R Solitario    Preferred pharmacy: [facility] : [id number]    Comment:

## 2018-12-16 RX ORDER — ONDANSETRON 4 MG/1
4 TABLET, ORALLY DISINTEGRATING ORAL EVERY 6 HOURS PRN
Qty: 20 TABLET | Refills: 0 | Status: SHIPPED | OUTPATIENT
Start: 2018-12-16 | End: 2018-12-26 | Stop reason: SDUPTHER

## 2018-12-24 ENCOUNTER — HOSPITAL ENCOUNTER (OUTPATIENT)
Dept: RADIOLOGY | Age: 28
Discharge: HOME | End: 2018-12-24
Payer: COMMERCIAL

## 2018-12-24 DIAGNOSIS — M79.671 PAIN IN RIGHT FOOT: ICD-10-CM

## 2018-12-24 DIAGNOSIS — S93.311A CUBOID SYNDROME OF RIGHT FOOT: ICD-10-CM

## 2018-12-24 DIAGNOSIS — M25.571 PAIN IN LATERAL PORTION OF RIGHT ANKLE: ICD-10-CM

## 2018-12-24 PROCEDURE — 73721 MRI JNT OF LWR EXTRE W/O DYE: CPT

## 2018-12-26 DIAGNOSIS — R11.0 NAUSEA: ICD-10-CM

## 2018-12-26 PROBLEM — J06.9 VIRAL UPPER RESPIRATORY TRACT INFECTION: Status: RESOLVED | Noted: 2018-10-25 | Resolved: 2018-12-26

## 2018-12-26 PROBLEM — J01.10 ACUTE NON-RECURRENT FRONTAL SINUSITIS: Status: RESOLVED | Noted: 2018-03-12 | Resolved: 2018-12-26

## 2018-12-26 RX ORDER — ONDANSETRON 4 MG/1
4 TABLET, ORALLY DISINTEGRATING ORAL EVERY 6 HOURS PRN
Qty: 20 TABLET | Refills: 0 | Status: SHIPPED | OUTPATIENT
Start: 2018-12-26 | End: 2019-03-02 | Stop reason: SDUPTHER

## 2018-12-28 ENCOUNTER — OFFICE VISIT (OUTPATIENT)
Dept: OBGYN CLINIC | Facility: CLINIC | Age: 28
End: 2018-12-28
Payer: COMMERCIAL

## 2018-12-28 VITALS
HEIGHT: 67 IN | BODY MASS INDEX: 29.19 KG/M2 | HEART RATE: 73 BPM | WEIGHT: 186 LBS | DIASTOLIC BLOOD PRESSURE: 75 MMHG | SYSTOLIC BLOOD PRESSURE: 109 MMHG

## 2018-12-28 DIAGNOSIS — M76.71 PERONEAL TENDONITIS, RIGHT: Primary | ICD-10-CM

## 2018-12-28 PROCEDURE — 99212 OFFICE O/P EST SF 10 MIN: CPT | Performed by: ORTHOPAEDIC SURGERY

## 2018-12-28 NOTE — PROGRESS NOTES
Assessment:  1  Peroneal tendonitis, right  Ambulatory referral to Physical Therapy     Patient Active Problem List   Diagnosis    Abdominal pain, RUQ    Anxiety    Pyelonephritis    Other microscopic hematuria    Cuboid syndrome of right foot    Encounter for physical examination related to employment    Pain in right foot    Pain in lateral portion of right ankle           Plan:  Reviewed MRI findings with the patient today  Discussed treatment options with the patient today  Patient will see Daniel Saucedo in PT for Orthotics        F/U PRN      Subjective:     Patient ID:    Chief Complaint:Ulisses Dyer 33 y o  female      HPI  Patient presents today to discuss the findings of her right ankle MRI  Patient denies any new injuries to the Right foot or ankle since the last visit  She denies any numbness or tingling  She reports that she still has been using some tape around the ankle which does slightly for the pain  She says the pain is still same location and character         The following portions of the patient's history were reviewed and updated as appropriate: allergies, current medications, past family history, past social history, past surgical history and problem list     All organ systems normal    Social History     Social History    Marital status: Single     Spouse name: N/A    Number of children: N/A    Years of education: N/A     Occupational History    employed       Social History Main Topics    Smoking status: Never Smoker    Smokeless tobacco: Never Used    Alcohol use Yes      Comment: occasional - social     Drug use: No    Sexual activity: Not on file     Other Topics Concern    Not on file     Social History Narrative    Exercise habits      Past Medical History:   Diagnosis Date    Foot fracture, right     Metatarsalgia     Self-mutilation     cutting      Past Surgical History:   Procedure Laterality Date    DENTAL SURGERY       No Known Allergies  Current Outpatient Prescriptions on File Prior to Visit   Medication Sig Dispense Refill    amoxicillin-clavulanate (AUGMENTIN) 875-125 mg per tablet TAKE 1 TABLET BY MOUTH EVERY 12 HOURS X 10 DAYS  *TAKE WITH FOOD*  0    methocarbamol (ROBAXIN) 500 mg tablet Take 500 mg by mouth every 6 (six) hours as needed  1    ondansetron (ZOFRAN-ODT) 4 mg disintegrating tablet       ondansetron (ZOFRAN-ODT) 4 mg disintegrating tablet Take 1 tablet (4 mg total) by mouth every 6 (six) hours as needed for nausea or vomiting 20 tablet 0     No current facility-administered medications on file prior to visit  Objective:    Ortho Exam  Right Ankle Exam   Swelling: none     Tenderness   The patient is experiencing tenderness in the ATF (  Tender over the proximal plantar fasciia,  tenderness along the distal peroneus tendon as well as tenderness over the cuboid region)        Range of Motion   Dorsiflexion: normal   Plantar flexion: normal   Inversion: normal   Eversion: normal      Muscle Strength   Right ankle normal muscle strength: EHL and FHL 5/5 strength  Dorsiflexion:  5/5  Plantar flexion:  5/5  Anterior tibial:  5/5  Posterior tibial:  5/5  Gastrocsoleus:  5/5  Peroneal muscle:  5/5     Tests   Anterior drawer: 2+  Varus tilt: negative     Other   Erythema: absent  Scars: absent  Sensation: normal  Right ankle pulse absent: 2+ DP pulse       Comments:  No ecchymosis      I have personally reviewed pertinent films in PACS and my interpretation is     peroneal tendonitis with  Os peronei with minimal edema    Scribe Attestation    I,:   Michelle Phelps am acting as a scribe while in the presence of the attending physician :        I,:   Jadiel Mtz DO personally performed the services described in this documentation    as scribed in my presence :            Portions of the record may have been created with voice recognition software   Occasional wrong word or "sound a like" substitutions may have occurred due to the inherent limitations of voice recognition software   Read the chart carefully and recognize, using context, where substitutions have occurred

## 2019-03-02 DIAGNOSIS — R11.0 NAUSEA: ICD-10-CM

## 2019-03-04 RX ORDER — ONDANSETRON 4 MG/1
4 TABLET, ORALLY DISINTEGRATING ORAL EVERY 6 HOURS PRN
Qty: 20 TABLET | Refills: 0 | Status: SHIPPED | OUTPATIENT
Start: 2019-03-04 | End: 2019-04-01 | Stop reason: SDUPTHER

## 2019-03-04 NOTE — TELEPHONE ENCOUNTER
BT pt  (Not sure why she needs this if it was only given to her when she had the GI bug in the past?? )

## 2019-03-06 PROBLEM — Z02.89 ENCOUNTER FOR PHYSICAL EXAMINATION RELATED TO EMPLOYMENT: Status: RESOLVED | Noted: 2018-08-09 | Resolved: 2019-03-06

## 2019-03-07 ENCOUNTER — HOSPITAL ENCOUNTER (OUTPATIENT)
Dept: CT IMAGING | Facility: HOSPITAL | Age: 29
Discharge: HOME | End: 2019-03-07
Payer: COMMERCIAL

## 2019-03-07 ENCOUNTER — OFFICE VISIT (OUTPATIENT)
Dept: FAMILY MEDICINE CLINIC | Facility: CLINIC | Age: 29
End: 2019-03-07
Payer: COMMERCIAL

## 2019-03-07 VITALS
WEIGHT: 178.2 LBS | TEMPERATURE: 97.9 F | HEIGHT: 67 IN | HEART RATE: 68 BPM | BODY MASS INDEX: 27.97 KG/M2 | DIASTOLIC BLOOD PRESSURE: 70 MMHG | SYSTOLIC BLOOD PRESSURE: 124 MMHG

## 2019-03-07 DIAGNOSIS — R10.9 FLANK PAIN: Primary | ICD-10-CM

## 2019-03-07 DIAGNOSIS — R10.9 RIGHT FLANK PAIN: ICD-10-CM

## 2019-03-07 DIAGNOSIS — N12 PYELONEPHRITIS: ICD-10-CM

## 2019-03-07 DIAGNOSIS — R31.29 OTHER MICROSCOPIC HEMATURIA: ICD-10-CM

## 2019-03-07 DIAGNOSIS — I95.1 POSTURAL HYPOTENSION: ICD-10-CM

## 2019-03-07 LAB
SL AMB  POCT GLUCOSE, UA: NEGATIVE
SL AMB LEUKOCYTE ESTERASE,UA: NORMAL
SL AMB POCT BILIRUBIN,UA: NEGATIVE
SL AMB POCT BLOOD,UA: NORMAL
SL AMB POCT CLARITY,UA: CLEAR
SL AMB POCT COLOR,UA: YELLOW
SL AMB POCT KETONES,UA: NEGATIVE
SL AMB POCT NITRITE,UA: NEGATIVE
SL AMB POCT PH,UA: 5.5
SL AMB POCT SPECIFIC GRAVITY,UA: 1.01
SL AMB POCT URINE PROTEIN: NORMAL
SL AMB POCT UROBILINOGEN: 0.2

## 2019-03-07 PROCEDURE — 81003 URINALYSIS AUTO W/O SCOPE: CPT | Performed by: PHYSICIAN ASSISTANT

## 2019-03-07 PROCEDURE — 74176 CT ABD & PELVIS W/O CONTRAST: CPT

## 2019-03-07 PROCEDURE — 99214 OFFICE O/P EST MOD 30 MIN: CPT | Performed by: PHYSICIAN ASSISTANT

## 2019-03-07 PROCEDURE — 87086 URINE CULTURE/COLONY COUNT: CPT | Performed by: PHYSICIAN ASSISTANT

## 2019-03-07 RX ORDER — CIPROFLOXACIN 500 MG/1
500 TABLET, FILM COATED ORAL EVERY 12 HOURS SCHEDULED
Qty: 20 TABLET | Refills: 0 | Status: SHIPPED | OUTPATIENT
Start: 2019-03-07 | End: 2019-03-14

## 2019-03-07 NOTE — PATIENT INSTRUCTIONS
Problem List Items Addressed This Visit        Cardiovascular and Mediastinum    Postural hypotension     Increase fluids change positions slowly may increase salt in diet  Genitourinary    Pyelonephritis     Hx of recurrent since 2016  At this time CT stone study is needed  Send urine for culture cover with Cipro 500 twice daily for 10 days  Increase fluids  Will call for culture only if we need to change antibiotic           Relevant Medications    ciprofloxacin (CIPRO) 500 mg tablet    Other Relevant Orders    CT renal stone study abdomen pelvis wo contrast    CT renal stone study abdomen pelvis wo contrast    Other microscopic hematuria    Relevant Medications    ciprofloxacin (CIPRO) 500 mg tablet    Other Relevant Orders    CT renal stone study abdomen pelvis wo contrast    Urine culture    CT renal stone study abdomen pelvis wo contrast       Other    Right flank pain    Relevant Medications    ciprofloxacin (CIPRO) 500 mg tablet    Other Relevant Orders    CT renal stone study abdomen pelvis wo contrast    CT renal stone study abdomen pelvis wo contrast      Other Visit Diagnoses     Flank pain    -  Primary    Relevant Orders    POCT urine dip auto non-scope (Completed)    Urine culture

## 2019-03-07 NOTE — PROGRESS NOTES
Assessment and Plan:  Send urine for culture  Stat CT abdomen pelvis  Initiate Cipro twice daily for 10 days  Problem List Items Addressed This Visit        Cardiovascular and Mediastinum    Postural hypotension     Increase fluids change positions slowly may increase salt in diet  Genitourinary    Pyelonephritis     Hx of recurrent since 2016  At this time CT stone study is needed  Send urine for culture cover with Cipro 500 twice daily for 10 days  Increase fluids  Will call for culture only if we need to change antibiotic  Relevant Medications    ciprofloxacin (CIPRO) 500 mg tablet    Other Relevant Orders    CT renal stone study abdomen pelvis wo contrast    CT renal stone study abdomen pelvis wo contrast    Other microscopic hematuria    Relevant Medications    ciprofloxacin (CIPRO) 500 mg tablet    Other Relevant Orders    CT renal stone study abdomen pelvis wo contrast    Urine culture    CT renal stone study abdomen pelvis wo contrast       Other    Right flank pain    Relevant Medications    ciprofloxacin (CIPRO) 500 mg tablet    Other Relevant Orders    CT renal stone study abdomen pelvis wo contrast    CT renal stone study abdomen pelvis wo contrast      Other Visit Diagnoses     Flank pain    -  Primary    Relevant Orders    POCT urine dip auto non-scope (Completed)    Urine culture             Diagnoses and all orders for this visit:    Flank pain  -     POCT urine dip auto non-scope  -     Urine culture    Other microscopic hematuria  -     Cancel: CT abdomen wo contrast; Future  -     ciprofloxacin (CIPRO) 500 mg tablet; Take 1 tablet (500 mg total) by mouth every 12 (twelve) hours for 10 days  -     CT renal stone study abdomen pelvis wo contrast; Future  -     Urine culture  -     CT renal stone study abdomen pelvis wo contrast; Future    Pyelonephritis  -     Cancel: CT abdomen wo contrast; Future  -     ciprofloxacin (CIPRO) 500 mg tablet;  Take 1 tablet (500 mg total) by mouth every 12 (twelve) hours for 10 days  -     CT renal stone study abdomen pelvis wo contrast; Future  -     CT renal stone study abdomen pelvis wo contrast; Future    Postural hypotension    Right flank pain  -     Cancel: CT abdomen wo contrast; Future  -     ciprofloxacin (CIPRO) 500 mg tablet; Take 1 tablet (500 mg total) by mouth every 12 (twelve) hours for 10 days  -     CT renal stone study abdomen pelvis wo contrast; Future  -     CT renal stone study abdomen pelvis wo contrast; Future              Subjective:      Patient ID: Alexandro Lucero is a 33 y o  female  CC:    Chief Complaint   Patient presents with    Flank Pain     x 4 days   Dizziness     when standing at times and some ringing in ear  HPI:    R flank pain started on Sunday from front to back  No menses  Last ended last Friday  Some nausea  Discomfort is constant  Using ibuprofen dn tylenol  Appetite is normal  Is on keto diet but without meat  August 2016 started getting these episodes about 2 times a year always on the right  All US were or have been normal with no stones and symptoms have always resolved with antibiotic use assuming pyelonephritis  Has never had a CT however  Also patient is having 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  She states she does try to drink a lot of water and does usually drink 1-2 cups of coffee a day  She states she hates salt and salty things  The following portions of the patient's history were reviewed and updated as appropriate: allergies, current medications, past family history, past medical history, past social history, past surgical history and problem list       Review of Systems   Constitutional: Negative  Negative for appetite change and fever  HENT: Negative  Eyes: Negative  Respiratory: Negative  Cardiovascular: Negative  Gastrointestinal: Positive for abdominal pain  Endocrine: Negative  Genitourinary: Positive for flank pain  Negative for dysuria, frequency, menstrual problem, pelvic pain and vaginal bleeding  Skin: Negative  Allergic/Immunologic: Negative  Neurological: Positive for dizziness and light-headedness  Hematological: Negative  Psychiatric/Behavioral: Negative  Data to review:       Objective:    Vitals:    03/07/19 0901   BP: 124/70   BP Location: Left arm   Patient Position: Sitting   Pulse: 68   Temp: 97 9 °F (36 6 °C)   TempSrc: Tympanic   Weight: 80 8 kg (178 lb 3 2 oz)   Height: 5' 7" (1 702 m)        Physical Exam   Constitutional: She is oriented to person, place, and time  She appears well-developed and well-nourished  No distress  HENT:   Head: Normocephalic and atraumatic  Eyes: Conjunctivae are normal  Right eye exhibits no discharge  Left eye exhibits no discharge  Neck: Neck supple  Carotid bruit is not present  Cardiovascular: Normal rate, regular rhythm and normal heart sounds  Exam reveals no gallop and no friction rub  No murmur heard  Pulmonary/Chest: Effort normal and breath sounds normal  No respiratory distress  She has no wheezes  She has no rales  Abdominal: Normal appearance and bowel sounds are normal  She exhibits no shifting dullness, no distension, no pulsatile liver, no fluid wave, no abdominal bruit, no ascites, no pulsatile midline mass and no mass  There is no hepatosplenomegaly  There is tenderness in the right upper quadrant  There is CVA tenderness  There is no rigidity, no rebound, no guarding, no tenderness at McBurney's point and negative Arevalo's sign  Pain to palpation of the right upper mid and slight lower quadrants  No guarding rigidity masses organomegaly noted  Positive right CVA tenderness  Neurological: She is alert and oriented to person, place, and time  Skin: Skin is warm and dry  She is not diaphoretic  Psychiatric: She has a normal mood and affect   Judgment normal    Nursing note and vitals reviewed

## 2019-03-07 NOTE — ASSESSMENT & PLAN NOTE
Hx of recurrent since 2016  At this time CT stone study is needed  Send urine for culture cover with Cipro 500 twice daily for 10 days  Increase fluids  Will call for culture only if we need to change antibiotic

## 2019-03-08 LAB — BACTERIA UR CULT: NORMAL

## 2019-03-14 ENCOUNTER — OFFICE VISIT (OUTPATIENT)
Dept: FAMILY MEDICINE CLINIC | Facility: CLINIC | Age: 29
End: 2019-03-14
Payer: COMMERCIAL

## 2019-03-14 VITALS
TEMPERATURE: 98.6 F | BODY MASS INDEX: 27.78 KG/M2 | SYSTOLIC BLOOD PRESSURE: 106 MMHG | HEIGHT: 67 IN | DIASTOLIC BLOOD PRESSURE: 68 MMHG | WEIGHT: 177 LBS | HEART RATE: 60 BPM

## 2019-03-14 DIAGNOSIS — K59.00 CONSTIPATION, UNSPECIFIED CONSTIPATION TYPE: ICD-10-CM

## 2019-03-14 DIAGNOSIS — E66.3 OVERWEIGHT (BMI 25.0-29.9): ICD-10-CM

## 2019-03-14 DIAGNOSIS — R10.84 GENERALIZED ABDOMINAL PAIN: Primary | ICD-10-CM

## 2019-03-14 DIAGNOSIS — R10.9 RIGHT FLANK PAIN: ICD-10-CM

## 2019-03-14 PROCEDURE — 3008F BODY MASS INDEX DOCD: CPT | Performed by: FAMILY MEDICINE

## 2019-03-14 PROCEDURE — 99214 OFFICE O/P EST MOD 30 MIN: CPT | Performed by: FAMILY MEDICINE

## 2019-03-14 PROCEDURE — 1036F TOBACCO NON-USER: CPT | Performed by: FAMILY MEDICINE

## 2019-03-14 NOTE — ASSESSMENT & PLAN NOTE
Right flank pain has resolved and her CT scan revealed no calculi present  She does have a 2 mm nonobstructing calculi in the left kidney

## 2019-03-14 NOTE — PATIENT INSTRUCTIONS

## 2019-03-14 NOTE — ASSESSMENT & PLAN NOTE
Patient has some epigastric and periumbilical discomfort but no pain  She does have a history of GERD and takes omeprazole on a when necessary basis and I suggested she use the omeprazole for 5 days and see if this relieves the abdominal discomfort

## 2019-03-14 NOTE — ASSESSMENT & PLAN NOTE
Patient's current weight is 177 lb down 1 lb from the previous visit and her BMI is 27 7  She does know that she needs to lose some weight with diet and exercise

## 2019-03-14 NOTE — PROGRESS NOTES
Assessment and Plan:  The patient will take 5 days of omeprazole and if symptoms do not madan the patient will contact the office  If the abdominal pain gets worse she realizes she needs to go to the ER for further evaluation and treatment  Problem List Items Addressed This Visit        Other    Right flank pain     Right flank pain has resolved and her CT scan revealed no calculi present  She does have a 2 mm nonobstructing calculi in the left kidney  Overweight (BMI 25 0-29  9)     Patient's current weight is 177 lb down 1 lb from the previous visit and her BMI is 27 7  She does know that she needs to lose some weight with diet and exercise  Constipation     She had a bowel movement after the CT scan that should have relieved the constipation problem  Her bowel history reveals that she goes every other day  RESOLVED: Generalized abdominal pain - Primary     Patient has some epigastric and periumbilical discomfort but no pain  She does have a history of GERD and takes omeprazole on a when necessary basis and I suggested she use the omeprazole for 5 days and see if this relieves the abdominal discomfort  Diagnoses and all orders for this visit:    Generalized abdominal pain    Right flank pain    Constipation, unspecified constipation type    Overweight (BMI 25 0-29  9)              Subjective:      Patient ID: Alex Ramirez is a 33 y o  female  CC:    Chief Complaint   Patient presents with    Abdominal Pain     pt states she was not able to complete antibiotic that was prescribed by [facility] due to developing a rash  right flank pain has somewhat resolved, but still has abdominal pain  -  lsh       HPI:    This is a 62-year-old female who comes in for a recheck following right flank pain and abdominal pain for which she was seen last week    She was placed on Cipro 500 mg 1 twice a day for 10 days because her urine dip was abnormal   However, her culture came back negative  She developed a rash on day 5 of the 10 days of Cipro and discontinued the medication  The flank pain has resolved and the abdominal pain is mostly epigastric  She does have a history of GERD and takes omeprazole on a p r n  Basis  Her CT scan showed a 2 mm nonobstructing calculi in the left kidney but no calculi in the right kidney  The CT scan did revealed that she was constipated and the bowel had a lot of stool  She has had a bowel movement since the CT scan and has a bowel movement every other day  She does not feel she is constipated today  She has no urinary symptoms including burning, frequency, or hematuria  Her blood pressure today is 106/68 and her temperature is 98 6°  Her weight is 177 lb down 1 lb from the previous visit and her BMI is 27 7  The following portions of the patient's history were reviewed and updated as appropriate: allergies, current medications, past family history, past medical history, past social history, past surgical history and problem list       Review of Systems   Constitutional: Negative  HENT: Negative  Eyes: Negative  Respiratory: Negative  Cardiovascular: Negative  Gastrointestinal: Negative  They go generalized abdominal discomfort   Endocrine: Negative  Genitourinary: Negative  Musculoskeletal: Negative  Skin: Negative  Allergic/Immunologic: Negative  Neurological: Negative  Hematological: Negative  Psychiatric/Behavioral: Negative  Data to review:       Objective:    Vitals:    03/14/19 1118   BP: 106/68   BP Location: Left arm   Patient Position: Sitting   Cuff Size: Large   Pulse: 60   Temp: 98 6 °F (37 °C)   TempSrc: Oral   Weight: 80 3 kg (177 lb)   Height: 5' 7" (1 702 m)        Physical Exam   Constitutional: She is oriented to person, place, and time  She appears well-developed and well-nourished  HENT:   Head: Normocephalic     Right Ear: External ear normal    Left Ear: External ear normal    Mouth/Throat: Oropharynx is clear and moist    Eyes: Pupils are equal, round, and reactive to light  Conjunctivae and EOM are normal    Neck: Normal range of motion  Neck supple  Cardiovascular: Normal rate, regular rhythm and normal heart sounds  Pulmonary/Chest: Effort normal and breath sounds normal    Abdominal: Soft  Bowel sounds are normal  There is hepatosplenomegaly  There is tenderness in the epigastric area and periumbilical area  There is no rigidity, no rebound, no guarding and no CVA tenderness  Musculoskeletal: Normal range of motion  Neurological: She is alert and oriented to person, place, and time  Skin: Skin is warm  Psychiatric: She has a normal mood and affect  Her behavior is normal  Judgment and thought content normal    Nursing note and vitals reviewed  BMI Counseling: Body mass index is 27 72 kg/m²  Discussed the patient's BMI with her  The BMI is above average  BMI counseling and education was provided to the patient  Nutrition recommendations include reducing portion sizes, decreasing overall calorie intake, 3-5 servings of fruits/vegetables daily, reducing fast food intake, consuming healthier snacks, decreasing soda and/or juice intake, moderation in carbohydrate intake and reducing intake of cholesterol  Exercise recommendations include moderate aerobic physical activity for 150 minutes/week

## 2019-04-01 ENCOUNTER — OFFICE VISIT (OUTPATIENT)
Dept: FAMILY MEDICINE CLINIC | Facility: CLINIC | Age: 29
End: 2019-04-01
Payer: COMMERCIAL

## 2019-04-01 VITALS
HEART RATE: 74 BPM | WEIGHT: 170 LBS | HEIGHT: 66 IN | TEMPERATURE: 98.6 F | BODY MASS INDEX: 27.32 KG/M2 | DIASTOLIC BLOOD PRESSURE: 70 MMHG | SYSTOLIC BLOOD PRESSURE: 102 MMHG

## 2019-04-01 DIAGNOSIS — N20.0 RENAL CALCULUS, LEFT: ICD-10-CM

## 2019-04-01 DIAGNOSIS — R11.0 NAUSEA: ICD-10-CM

## 2019-04-01 DIAGNOSIS — E66.3 OVERWEIGHT (BMI 25.0-29.9): ICD-10-CM

## 2019-04-01 DIAGNOSIS — R10.9 ACUTE RIGHT FLANK PAIN: Primary | ICD-10-CM

## 2019-04-01 PROCEDURE — 1036F TOBACCO NON-USER: CPT | Performed by: FAMILY MEDICINE

## 2019-04-01 PROCEDURE — 99213 OFFICE O/P EST LOW 20 MIN: CPT | Performed by: FAMILY MEDICINE

## 2019-04-01 PROCEDURE — 3008F BODY MASS INDEX DOCD: CPT | Performed by: FAMILY MEDICINE

## 2019-04-01 RX ORDER — ONDANSETRON 4 MG/1
4 TABLET, ORALLY DISINTEGRATING ORAL EVERY 6 HOURS PRN
Qty: 20 TABLET | Refills: 0 | Status: SHIPPED | OUTPATIENT
Start: 2019-04-01 | End: 2019-06-05 | Stop reason: SDUPTHER

## 2019-04-26 ENCOUNTER — OFFICE VISIT (OUTPATIENT)
Dept: FAMILY MEDICINE CLINIC | Facility: CLINIC | Age: 29
End: 2019-04-26
Payer: COMMERCIAL

## 2019-04-26 VITALS
WEIGHT: 173 LBS | HEART RATE: 60 BPM | HEIGHT: 66 IN | SYSTOLIC BLOOD PRESSURE: 108 MMHG | TEMPERATURE: 99 F | BODY MASS INDEX: 27.8 KG/M2 | DIASTOLIC BLOOD PRESSURE: 68 MMHG

## 2019-04-26 DIAGNOSIS — J01.90 ACUTE NON-RECURRENT SINUSITIS, UNSPECIFIED LOCATION: Primary | ICD-10-CM

## 2019-04-26 PROCEDURE — 99213 OFFICE O/P EST LOW 20 MIN: CPT | Performed by: FAMILY MEDICINE

## 2019-04-26 PROCEDURE — 3008F BODY MASS INDEX DOCD: CPT | Performed by: FAMILY MEDICINE

## 2019-04-26 RX ORDER — AZITHROMYCIN 500 MG/1
500 TABLET, FILM COATED ORAL DAILY
Qty: 3 TABLET | Refills: 0 | Status: SHIPPED | OUTPATIENT
Start: 2019-04-26 | End: 2019-04-29

## 2019-05-01 ENCOUNTER — CONSULT (OUTPATIENT)
Dept: UROLOGY | Facility: AMBULATORY SURGERY CENTER | Age: 29
End: 2019-05-01
Payer: COMMERCIAL

## 2019-05-01 VITALS
DIASTOLIC BLOOD PRESSURE: 66 MMHG | WEIGHT: 171 LBS | BODY MASS INDEX: 26.84 KG/M2 | SYSTOLIC BLOOD PRESSURE: 110 MMHG | HEIGHT: 67 IN | HEART RATE: 78 BPM

## 2019-05-01 DIAGNOSIS — N20.0 RENAL CALCULUS, LEFT: ICD-10-CM

## 2019-05-01 DIAGNOSIS — R10.9 ACUTE RIGHT FLANK PAIN: Primary | ICD-10-CM

## 2019-05-01 LAB
SL AMB  POCT GLUCOSE, UA: NORMAL
SL AMB LEUKOCYTE ESTERASE,UA: NORMAL
SL AMB POCT BLOOD,UA: NORMAL
SL AMB POCT CLARITY,UA: CLEAR
SL AMB POCT COLOR,UA: YELLOW
SL AMB POCT KETONES,UA: NORMAL
SL AMB POCT NITRITE,UA: NORMAL
SL AMB POCT PH,UA: 7
SL AMB POCT SPECIFIC GRAVITY,UA: 1.01
SL AMB POCT URINE PROTEIN: NORMAL

## 2019-05-01 PROCEDURE — 81002 URINALYSIS NONAUTO W/O SCOPE: CPT | Performed by: UROLOGY

## 2019-05-01 PROCEDURE — 99243 OFF/OP CNSLTJ NEW/EST LOW 30: CPT | Performed by: UROLOGY

## 2019-05-14 ENCOUNTER — OFFICE VISIT (OUTPATIENT)
Dept: URGENT CARE | Facility: CLINIC | Age: 29
End: 2019-05-14
Payer: COMMERCIAL

## 2019-05-14 VITALS
WEIGHT: 173 LBS | OXYGEN SATURATION: 97 % | RESPIRATION RATE: 16 BRPM | HEART RATE: 71 BPM | DIASTOLIC BLOOD PRESSURE: 74 MMHG | TEMPERATURE: 98.3 F | HEIGHT: 65 IN | BODY MASS INDEX: 28.82 KG/M2 | SYSTOLIC BLOOD PRESSURE: 126 MMHG

## 2019-05-14 DIAGNOSIS — J01.90 ACUTE RHINOSINUSITIS: Primary | ICD-10-CM

## 2019-05-14 PROCEDURE — 99213 OFFICE O/P EST LOW 20 MIN: CPT | Performed by: PHYSICIAN ASSISTANT

## 2019-06-05 DIAGNOSIS — R11.0 NAUSEA: ICD-10-CM

## 2019-06-06 RX ORDER — ONDANSETRON 4 MG/1
4 TABLET, ORALLY DISINTEGRATING ORAL EVERY 6 HOURS PRN
Qty: 20 TABLET | Refills: 0 | Status: SHIPPED | OUTPATIENT
Start: 2019-06-06 | End: 2019-08-08 | Stop reason: SDUPTHER

## 2019-08-05 ENCOUNTER — OFFICE VISIT (OUTPATIENT)
Dept: URGENT CARE | Facility: CLINIC | Age: 29
End: 2019-08-05
Payer: COMMERCIAL

## 2019-08-05 VITALS
HEIGHT: 70 IN | WEIGHT: 169 LBS | HEART RATE: 94 BPM | BODY MASS INDEX: 24.2 KG/M2 | TEMPERATURE: 100 F | OXYGEN SATURATION: 96 % | RESPIRATION RATE: 18 BRPM | SYSTOLIC BLOOD PRESSURE: 108 MMHG | DIASTOLIC BLOOD PRESSURE: 82 MMHG

## 2019-08-05 DIAGNOSIS — J02.9 SORE THROAT: Primary | ICD-10-CM

## 2019-08-05 LAB — S PYO AG THROAT QL: NEGATIVE

## 2019-08-05 PROCEDURE — 87880 STREP A ASSAY W/OPTIC: CPT | Performed by: PHYSICIAN ASSISTANT

## 2019-08-05 PROCEDURE — 99213 OFFICE O/P EST LOW 20 MIN: CPT | Performed by: PHYSICIAN ASSISTANT

## 2019-08-05 PROCEDURE — 87070 CULTURE OTHR SPECIMN AEROBIC: CPT | Performed by: PHYSICIAN ASSISTANT

## 2019-08-05 NOTE — LETTER
August 5, 2019     Patient: Gina Lane   YOB: 1990   Date of Visit: 8/5/2019       To Whom it May Concern:    Gina Lane was seen in my clinic on 8/5/2019  She may return to work on 08/07/2019  If you have any questions or concerns, please don't hesitate to call           Sincerely,          Celina Ragland PA-C        CC: No Recipients

## 2019-08-05 NOTE — PROGRESS NOTES
NAME: Suzanne Ruby is a 33 y o  female  : 1990    MRN: [id number]      Assessment and Plan   Sore throat [J02 9]  1  Sore throat  POCT rapid strepA    Throat culture    al mag oxide-diphenhydramine-lidocaine viscous (MAGIC MOUTHWASH) 1:1:1 suspension   Rapid strep culture ordered to rule out strep  Rapid strep was negative  At this time will send for strep culture  Exam findings are consistent with viral etiology  At this time will provide patient Magic mouthwash  No abx warranted at this time  Advise Pt to continue use of OTC Tylenol alternating with OTC Ibuprofen as needed for pain  Patient understands and agrees with treatment plans  Amadeo Royer was seen today for sore throat  Diagnoses and all orders for this visit:    Sore throat  -     POCT rapid strepA  -     Throat culture  -     al mag oxide-diphenhydramine-lidocaine viscous (MAGIC MOUTHWASH) 1:1:1 suspension; Swish and spit 10 mL every 4 (four) hours as needed for mouth pain or discomfort        Patient Instructions   There are no Patient Instructions on file for this visit  Proceed to ER if symptoms worsen  Chief Complaint     Chief Complaint   Patient presents with    Sore Throat     Pt states she has had sore throat x 3 days  Pt had fever last evening of 101 0          History of Present Illness     33year old presents c/o S/T  x 3 day  Pt admits fever tmax 101, nausea, H/A   Pt denies  chills, fatigue, v/d/c, rhinorrhea, nasal congestion,  post-nasal drip, ear pain/ ear pressure, sinus pain/ pressure, SOB, chest pain, difficulty breathing  Has taken OTC tylenol  - last dose lastnight      Review of Systems   Review of Systems   Constitutional: Negative for chills, fatigue and fever  HENT: Positive for sore throat  Negative for congestion, ear pain, postnasal drip, rhinorrhea and sinus pressure  Respiratory: Negative for cough, chest tightness, shortness of breath and wheezing      Cardiovascular: Negative for chest pain and palpitations  Gastrointestinal: Positive for nausea  Negative for abdominal pain, constipation, diarrhea and vomiting  Neurological: Positive for headaches  Current Medications       Current Outpatient Medications:     ondansetron (ZOFRAN-ODT) 4 mg disintegrating tablet, Take 1 tablet (4 mg total) by mouth every 6 (six) hours as needed for nausea or vomiting, Disp: 20 tablet, Rfl: 0    al mag oxide-diphenhydramine-lidocaine viscous (MAGIC MOUTHWASH) 1:1:1 suspension, Swish and spit 10 mL every 4 (four) hours as needed for mouth pain or discomfort, Disp: 1 Bottle, Rfl: 0    Current Allergies     Allergies as of 08/05/2019 - Reviewed 08/05/2019   Allergen Reaction Noted    Ciprofloxacin Rash 03/14/2019              Past Medical History:   Diagnosis Date    Foot fracture, right     Known health problems: none     Metatarsalgia     Self-mutilation     cutting        Past Surgical History:   Procedure Laterality Date    DENTAL SURGERY         Family History   Problem Relation Age of Onset    Bipolar disorder Brother     Gallbladder disease Family     Cancer Mother     No Known Problems Father          Medications have been verified  The following portions of the patient's history were reviewed and updated as appropriate: allergies, current medications, past family history, past medical history, past social history, past surgical history and problem list     Objective   /82   Pulse 94   Temp 100 °F (37 8 °C)   Resp 18   Ht 5' 10" (1 778 m)   Wt 76 7 kg (169 lb)   SpO2 96%   BMI 24 25 kg/m²      Physical Exam     Physical Exam   Constitutional: She appears well-developed and well-nourished  No distress  HENT:   Head: Normocephalic and atraumatic     Right Ear: Hearing, tympanic membrane, external ear and ear canal normal    Left Ear: Hearing, tympanic membrane, external ear and ear canal normal    Nose: Nose normal  Right sinus exhibits no maxillary sinus tenderness and no frontal sinus tenderness  Left sinus exhibits no maxillary sinus tenderness and no frontal sinus tenderness  Mouth/Throat: Uvula is midline, oropharynx is clear and moist and mucous membranes are normal  No tonsillar exudate  Cardiovascular: Normal rate, regular rhythm and normal heart sounds  Exam reveals no gallop and no friction rub  No murmur heard  Pulmonary/Chest: Effort normal and breath sounds normal  No stridor  She has no wheezes  She has no rales  Skin: She is not diaphoretic         Celina Ragland PA-C

## 2019-08-06 ENCOUNTER — OFFICE VISIT (OUTPATIENT)
Dept: URGENT CARE | Facility: CLINIC | Age: 29
End: 2019-08-06
Payer: COMMERCIAL

## 2019-08-06 VITALS
WEIGHT: 165 LBS | BODY MASS INDEX: 26.52 KG/M2 | HEIGHT: 66 IN | HEART RATE: 68 BPM | DIASTOLIC BLOOD PRESSURE: 75 MMHG | OXYGEN SATURATION: 97 % | SYSTOLIC BLOOD PRESSURE: 134 MMHG | RESPIRATION RATE: 18 BRPM | TEMPERATURE: 97.9 F

## 2019-08-06 DIAGNOSIS — J02.0 STREP PHARYNGITIS: Primary | ICD-10-CM

## 2019-08-06 LAB — S PYO AG THROAT QL: NEGATIVE

## 2019-08-06 PROCEDURE — 99213 OFFICE O/P EST LOW 20 MIN: CPT | Performed by: PHYSICIAN ASSISTANT

## 2019-08-06 PROCEDURE — S9088 SERVICES PROVIDED IN URGENT: HCPCS | Performed by: PHYSICIAN ASSISTANT

## 2019-08-06 PROCEDURE — 87880 STREP A ASSAY W/OPTIC: CPT | Performed by: PHYSICIAN ASSISTANT

## 2019-08-06 RX ORDER — PREDNISONE 50 MG/1
50 TABLET ORAL DAILY
Qty: 5 TABLET | Refills: 0 | Status: SHIPPED | OUTPATIENT
Start: 2019-08-06 | End: 2019-08-11

## 2019-08-06 RX ORDER — AMOXICILLIN 500 MG/1
500 TABLET, FILM COATED ORAL 2 TIMES DAILY
Qty: 20 TABLET | Refills: 0 | Status: SHIPPED | OUTPATIENT
Start: 2019-08-06 | End: 2019-08-16

## 2019-08-06 NOTE — PATIENT INSTRUCTIONS
Rapid strep performed in office-  Throat cultures obtained on previous visit pending  Will call with results  Prescriptions sent to the pharmacy for prednisone and amoxicillin-use as directed  Saltwater gargles, Tylenol/ibuprofen as needed for fever or pain, cool mist humidifier  Follow up with PCP in 3-5 days  Proceed to  ER if symptoms worsen  Strep Throat   AMBULATORY CARE:   Strep throat  is a throat infection caused by bacteria  It is easily spread from person to person  Common symptoms include the following:   · Sore, red, and swollen throat    · Fever and headache     · Upset stomach, abdominal pain, or vomiting    · White or yellow patches or blisters in the back of your throat    · Tender, swollen lumps on the sides of your neck or jaw    · Throat pain when you swallow  Call 911 for any of the following:   · You have trouble breathing  Seek care immediately if:   · You have new symptoms like a bad headache, stiff neck, chest pain, or vomiting  · You are drooling because you cannot swallow your spit  Contact your healthcare provider if:   · You have a fever  · You have a rash or ear pain  · You have green, yellow-brown, or bloody mucus when you cough or blow your nose  · You are unable to drink anything  · You have questions or concerns about your condition or care  Treatment for strep throat  may include antibiotic medicine to treat your strep throat  You should feel better within 2 to 3 days after you start antibiotics  You may return to work or school 24 hours after you start antibiotics  Manage strep throat:   · Use lozenges, ice, soft foods, or popsicles  to soothe your throat  · Drink juice, milk shakes, or soup  if your throat is too sore to eat solid food  Drinking liquids can also help prevent dehydration  · Gargle with salt water  Mix ¼ teaspoon salt in a glass of warm water and gargle  This may help reduce swelling in your throat  · Do not smoke  Nicotine and other chemicals in cigarettes and cigars can cause lung damage and make your symptoms worse  Ask your healthcare provider for information if you currently smoke and need help to quit  E-cigarettes or smokeless tobacco still contain nicotine  Talk to your healthcare provider before you use these products  Prevent the spread of strep throat:   · Wash your hands often  Use soap and water  Wash your hands after you use the bathroom, change a child's diapers, or sneeze  Wash your hands before you prepare or eat food  · Do not share food or drinks  Replace your toothbrush after you have taken antibiotics for 24 hours  Follow up with your healthcare provider as directed:  Write down your questions so you remember to ask them during your visits  © 2017 [facility] Information is for End User's use only and may not be sold, redistributed or otherwise used for commercial purposes  All illustrations and images included in CareNotes® are the copyrighted property of A D [employer] M [employer]  or [employer]  The above information is an  only  It is not intended as medical advice for individual conditions or treatments  Talk to your doctor, nurse or pharmacist before following any medical regimen to see if it is safe and effective for you

## 2019-08-06 NOTE — PROGRESS NOTES
[facility]        NAME: Contreras Murdock is a 33 y o  female  : 1990    MRN: [id number]  DATE: 2019  TIME: 9:26 AM    Assessment and Plan   Strep pharyngitis [J02 0]  1  Strep pharyngitis  predniSONE 50 mg tablet    amoxicillin (AMOXIL) 500 MG tablet    POCT rapid strepA         Patient Instructions   Rapid strep performed in office-  Throat cultures obtained on previous visit pending  Will call with results  Prescriptions sent to the pharmacy for prednisone and amoxicillin-use as directed  Saltwater gargles, Tylenol/ibuprofen as needed for fever or pain, cool mist humidifier  Follow up with PCP in 3-5 days  Proceed to  ER if symptoms worsen  Chief Complaint     Chief Complaint   Patient presents with    Sore Throat     Sore throat x 2 days  Fever sat and   tmax 101 9  Fever has since resolved  Taking tylenol         History of Present Illness   The patient is a 79-year-old female who presents with a sore throat for 2-3 days  She does have a history of recurrent strep pharyngitis  She states that she went to the urgent care facility yesterday where she had a rapid strep and culture performed  The rapid strep was negative for strep pharyngitis  Overnight her sore throat has significantly worsen  She is having difficulty swallowing  Positive fever and shaking chills  Positive fatigue  Positive headache  Negative nasal or sinus congestion  Negative cough  Negative abdominal pain, vomiting or diarrhea  Positive nausea  She denies any chance of pregnancy  HPI    Review of Systems   Review of Systems   Constitutional: Positive for activity change, appetite change, chills, fatigue and fever  HENT: Positive for sore throat, trouble swallowing and voice change  Negative for congestion, ear discharge, ear pain, facial swelling, mouth sores, postnasal drip, rhinorrhea, sinus pressure, sinus pain and sneezing      Eyes: Negative for pain, discharge, redness and itching  Respiratory: Negative for apnea, cough, chest tightness, shortness of breath, wheezing and stridor  Cardiovascular: Negative for chest pain  Gastrointestinal: Negative for abdominal distention, abdominal pain, diarrhea, nausea and vomiting  Genitourinary: Negative for difficulty urinating  Musculoskeletal: Negative for arthralgias and myalgias  Skin: Negative for pallor and rash  Allergic/Immunologic: Negative  Neurological: Positive for headaches  Negative for dizziness and light-headedness  Hematological: Negative  Psychiatric/Behavioral: Negative  All other systems reviewed and are negative          Current Medications       Current Outpatient Medications:     ondansetron (ZOFRAN-ODT) 4 mg disintegrating tablet, Take 1 tablet (4 mg total) by mouth every 6 (six) hours as needed for nausea or vomiting, Disp: 20 tablet, Rfl: 0    al mag oxide-diphenhydramine-lidocaine viscous (MAGIC MOUTHWASH) 1:1:1 suspension, Swish and spit 10 mL every 4 (four) hours as needed for mouth pain or discomfort (Patient not taking: Reported on 8/6/2019), Disp: 1 Bottle, Rfl: 0    amoxicillin (AMOXIL) 500 MG tablet, Take 1 tablet (500 mg total) by mouth 2 (two) times a day for 10 days, Disp: 20 tablet, Rfl: 0    predniSONE 50 mg tablet, Take 1 tablet (50 mg total) by mouth daily for 5 days, Disp: 5 tablet, Rfl: 0    Current Allergies     Allergies as of 08/06/2019 - Reviewed 08/06/2019   Allergen Reaction Noted    Ciprofloxacin Rash 03/14/2019            The following portions of the patient's history were reviewed and updated as appropriate: allergies, current medications, past family history, past medical history, past social history, past surgical history and problem list      Past Medical History:   Diagnosis Date    Foot fracture, right     Known health problems: none     Metatarsalgia     Self-mutilation     cutting        Past Surgical History:   Procedure Laterality Date    DENTAL SURGERY         Family History   Problem Relation Age of Onset    Bipolar disorder Brother     Gallbladder disease Family     Cancer Mother     No Known Problems Father          Medications have been verified  Objective   /75   Pulse 68   Temp 97 9 °F (36 6 °C) (Temporal)   Resp 18   Ht 5' 6" (1 676 m)   Wt 74 8 kg (165 lb)   LMP 07/29/2019   SpO2 97%   BMI 26 63 kg/m²        Physical Exam     Physical Exam   Constitutional: She is oriented to person, place, and time  She appears well-developed and well-nourished  Non-toxic appearance  She does not have a sickly appearance  She appears ill  No distress  HENT:   Head: Normocephalic and atraumatic  Right Ear: Hearing, tympanic membrane, external ear and ear canal normal  No drainage or tenderness  Tympanic membrane is not perforated, not erythematous, not retracted and not bulging  No middle ear effusion  No decreased hearing is noted  Left Ear: Hearing, tympanic membrane, external ear and ear canal normal  No drainage or tenderness  Tympanic membrane is not perforated, not erythematous, not retracted and not bulging  No middle ear effusion  No decreased hearing is noted  Nose: Nose normal  No mucosal edema, rhinorrhea or septal deviation  Right sinus exhibits no maxillary sinus tenderness and no frontal sinus tenderness  Left sinus exhibits no maxillary sinus tenderness and no frontal sinus tenderness  Mouth/Throat: Uvula is midline and mucous membranes are normal  Mucous membranes are not pale, not dry and not cyanotic  No oral lesions  No dental abscesses or uvula swelling  Oropharyngeal exudate, posterior oropharyngeal edema and posterior oropharyngeal erythema present  No tonsillar abscesses  Tonsillar exudate  Eyes: Pupils are equal, round, and reactive to light  Conjunctivae and EOM are normal    Neck: Trachea normal, normal range of motion and full passive range of motion without pain  Neck supple  No JVD present   No edema and no erythema present  No thyromegaly present  Cardiovascular: Normal rate, regular rhythm, S1 normal, S2 normal, normal heart sounds, intact distal pulses and normal pulses  No murmur heard  Pulmonary/Chest: Effort normal and breath sounds normal  No accessory muscle usage or stridor  No respiratory distress  She has no wheezes  Abdominal: Soft  Normal appearance and bowel sounds are normal  She exhibits no distension  There is no hepatosplenomegaly  There is no tenderness  Musculoskeletal: Normal range of motion  She exhibits no edema  Neurological: She is alert and oriented to person, place, and time  Skin: Skin is warm, dry and intact  No abrasion, no lesion and no rash noted  She is not diaphoretic  No cyanosis or erythema  Nails show no clubbing  Psychiatric: She has a normal mood and affect  Her speech is normal and behavior is normal    Nursing note and vitals reviewed

## 2019-08-06 NOTE — LETTER
August 6, 2019     Patient: Sandrine Salazar   YOB: 1990   Date of Visit: 8/6/2019       To Whom it May Concern:    Sandrine Salazar is under my professional care  She was seen in my office on 8/6/2019  She may return to work 8/7/2019  If you have any questions or concerns, please don't hesitate to call           Sincerely,          Alexei Mariano PA-C        CC: Ben Quiros

## 2019-08-07 LAB — BACTERIA THROAT CULT: NORMAL

## 2019-08-08 ENCOUNTER — APPOINTMENT (OUTPATIENT)
Dept: LAB | Facility: CLINIC | Age: 29
End: 2019-08-08
Payer: COMMERCIAL

## 2019-08-08 ENCOUNTER — TELEPHONE (OUTPATIENT)
Dept: URGENT CARE | Facility: CLINIC | Age: 29
End: 2019-08-08

## 2019-08-08 DIAGNOSIS — J02.9 SORE THROAT: ICD-10-CM

## 2019-08-08 DIAGNOSIS — R53.83 FATIGUE, UNSPECIFIED TYPE: Primary | ICD-10-CM

## 2019-08-08 DIAGNOSIS — R11.0 NAUSEA: ICD-10-CM

## 2019-08-08 LAB
BACTERIA UR QL AUTO: ABNORMAL /HPF
BASOPHILS # BLD MANUAL: 0 THOUSAND/UL (ref 0–0.1)
BASOPHILS NFR MAR MANUAL: 0 % (ref 0–1)
BILIRUB UR QL STRIP: NEGATIVE
CLARITY UR: CLEAR
COLOR UR: YELLOW
EOSINOPHIL # BLD MANUAL: 0 THOUSAND/UL (ref 0–0.4)
EOSINOPHIL NFR BLD MANUAL: 0 % (ref 0–6)
ERYTHROCYTE [DISTWIDTH] IN BLOOD BY AUTOMATED COUNT: 12.4 % (ref 11.6–15.1)
GLUCOSE UR STRIP-MCNC: NEGATIVE MG/DL
HCT VFR BLD AUTO: 39.3 % (ref 34.8–46.1)
HGB BLD-MCNC: 12.7 G/DL (ref 11.5–15.4)
HGB UR QL STRIP.AUTO: ABNORMAL
HYALINE CASTS #/AREA URNS LPF: ABNORMAL /LPF
KETONES UR STRIP-MCNC: NEGATIVE MG/DL
LEUKOCYTE ESTERASE UR QL STRIP: NEGATIVE
LYMPHOCYTES # BLD AUTO: 0.49 THOUSAND/UL (ref 0.6–4.47)
LYMPHOCYTES # BLD AUTO: 4 % (ref 14–44)
MCH RBC QN AUTO: 28.9 PG (ref 26.8–34.3)
MCHC RBC AUTO-ENTMCNC: 32.3 G/DL (ref 31.4–37.4)
MCV RBC AUTO: 89 FL (ref 82–98)
MONOCYTES # BLD AUTO: 0.25 THOUSAND/UL (ref 0–1.22)
MONOCYTES NFR BLD: 2 % (ref 4–12)
MYELOCYTES NFR BLD MANUAL: 1 % (ref 0–1)
NEUTROPHILS # BLD MANUAL: 11.49 THOUSAND/UL (ref 1.85–7.62)
NEUTS SEG NFR BLD AUTO: 93 % (ref 43–75)
NITRITE UR QL STRIP: NEGATIVE
NON-SQ EPI CELLS URNS QL MICRO: ABNORMAL /HPF
NRBC BLD AUTO-RTO: 0 /100 WBCS
PH UR STRIP.AUTO: 6 [PH]
PLATELET # BLD AUTO: 248 THOUSANDS/UL (ref 149–390)
PLATELET BLD QL SMEAR: ADEQUATE
PMV BLD AUTO: 11.2 FL (ref 8.9–12.7)
PROT UR STRIP-MCNC: NEGATIVE MG/DL
RBC # BLD AUTO: 4.4 MILLION/UL (ref 3.81–5.12)
RBC #/AREA URNS AUTO: ABNORMAL /HPF
RBC MORPH BLD: NORMAL
SP GR UR STRIP.AUTO: 1.02 (ref 1–1.03)
T4 FREE SERPL-MCNC: 1.16 NG/DL (ref 0.76–1.46)
TSH SERPL DL<=0.05 MIU/L-ACNC: 0.61 UIU/ML (ref 0.36–3.74)
UROBILINOGEN UR QL STRIP.AUTO: 0.2 E.U./DL
WBC # BLD AUTO: 12.35 THOUSAND/UL (ref 4.31–10.16)
WBC #/AREA URNS AUTO: ABNORMAL /HPF

## 2019-08-08 PROCEDURE — 81001 URINALYSIS AUTO W/SCOPE: CPT | Performed by: UROLOGY

## 2019-08-08 PROCEDURE — 84443 ASSAY THYROID STIM HORMONE: CPT

## 2019-08-08 PROCEDURE — 85007 BL SMEAR W/DIFF WBC COUNT: CPT | Performed by: PHYSICIAN ASSISTANT

## 2019-08-08 PROCEDURE — 85027 COMPLETE CBC AUTOMATED: CPT | Performed by: PHYSICIAN ASSISTANT

## 2019-08-08 PROCEDURE — 36415 COLL VENOUS BLD VENIPUNCTURE: CPT | Performed by: PHYSICIAN ASSISTANT

## 2019-08-08 PROCEDURE — 84439 ASSAY OF FREE THYROXINE: CPT

## 2019-08-08 RX ORDER — ONDANSETRON 4 MG/1
4 TABLET, ORALLY DISINTEGRATING ORAL EVERY 6 HOURS PRN
Qty: 20 TABLET | Refills: 0 | Status: SHIPPED | OUTPATIENT
Start: 2019-08-08 | End: 2019-12-12 | Stop reason: SDUPTHER

## 2019-08-08 NOTE — TELEPHONE ENCOUNTER
Labs ordered for ongoing sore throat and symptoms-throat culture negative  Her PCP does not have a follow up appointment available until next week

## 2019-08-10 ENCOUNTER — OFFICE VISIT (OUTPATIENT)
Dept: URGENT CARE | Facility: CLINIC | Age: 29
End: 2019-08-10
Payer: COMMERCIAL

## 2019-08-10 VITALS
WEIGHT: 165 LBS | SYSTOLIC BLOOD PRESSURE: 118 MMHG | OXYGEN SATURATION: 98 % | HEIGHT: 66 IN | TEMPERATURE: 98 F | HEART RATE: 84 BPM | BODY MASS INDEX: 26.52 KG/M2 | DIASTOLIC BLOOD PRESSURE: 80 MMHG | RESPIRATION RATE: 20 BRPM

## 2019-08-10 DIAGNOSIS — J06.9 VIRAL UPPER RESPIRATORY TRACT INFECTION: Primary | ICD-10-CM

## 2019-08-10 PROCEDURE — S9088 SERVICES PROVIDED IN URGENT: HCPCS | Performed by: FAMILY MEDICINE

## 2019-08-10 PROCEDURE — 99213 OFFICE O/P EST LOW 20 MIN: CPT | Performed by: FAMILY MEDICINE

## 2019-08-10 RX ORDER — BENZONATATE 200 MG/1
200 CAPSULE ORAL 3 TIMES DAILY PRN
Qty: 20 CAPSULE | Refills: 0 | Status: SHIPPED | OUTPATIENT
Start: 2019-08-10 | End: 2019-10-25 | Stop reason: ALTCHOICE

## 2019-08-10 NOTE — LETTER
August 10, 2019     Patient: Leland Sahu   YOB: 1990   Date of Visit: 8/10/2019       To Whom it May Concern:    Leland Sahu was seen in my clinic on 8/10/2019  Please excuse from work 8/10/19 and 8/11/19  If you have any questions or concerns, please don't hesitate to call           Sincerely,          Suni Licona PA-C        CC: Flakito Coronel

## 2019-08-10 NOTE — PATIENT INSTRUCTIONS
Benzonatate prescription as directed for cough  Increase fluids  Follow up with PCP in 3-5 days  Proceed to  ER if symptoms worsen

## 2019-08-10 NOTE — PROGRESS NOTES
[facility]        NAME: Deepak Schreiber is a 26 y o  female  : 1990    MRN: [id number]  DATE: August 10, 2019  TIME: 12:20 PM    Assessment and Plan   Viral upper respiratory tract infection [J06 9]  1  Viral upper respiratory tract infection  benzonatate (TESSALON) 200 MG capsule     Discussed patient's blood work results with her  Mono panel suggests past infection of mono which she admits to at age 22, no current infection of mono per blood work  CBC shows elevated white count which correlates to current illness  Other labs are unremarkable  She will continue treatment for cough  Patient Instructions     Benzonatate prescription as directed for cough  Increase fluids  Follow up with PCP in 3-5 days  Proceed to  ER if symptoms worsen  Chief Complaint     Chief Complaint   Patient presents with    Cough     Patient was seen here on Tuesday given Predisone - she does not feel that she is getting any better- she called off from work today    Fatigue         History of Present Illness       Patient is a 79-year-old female presenting for follow-up for illness  She is also in need of a work note as she has taken off work today  She has been ill for the past week with a sore throat and fatigue  She has also developed a dry cough recently  She was seen several days ago here and prescribed prednisone with amoxicillin  She notes some improvement on the medication  She also had some blood work ordered here that she would like to go over  She denies any current fever or chills, sinus congestion, GI symptoms  Review of Systems   Review of Systems   Constitutional: Positive for fatigue  Negative for chills, diaphoresis and fever  HENT: Positive for sore throat  Negative for congestion and trouble swallowing  Eyes: Negative for redness  Respiratory: Positive for cough  Negative for chest tightness, shortness of breath and wheezing      Gastrointestinal: Negative for abdominal pain, diarrhea and vomiting  Skin: Negative for rash  Neurological: Positive for headaches  Current Medications       Current Outpatient Medications:     al mag oxide-diphenhydramine-lidocaine viscous (MAGIC MOUTHWASH) 1:1:1 suspension, Swish and spit 10 mL every 4 (four) hours as needed for mouth pain or discomfort (Patient not taking: Reported on 8/6/2019), Disp: 1 Bottle, Rfl: 0    amoxicillin (AMOXIL) 500 MG tablet, Take 1 tablet (500 mg total) by mouth 2 (two) times a day for 10 days, Disp: 20 tablet, Rfl: 0    benzonatate (TESSALON) 200 MG capsule, Take 1 capsule (200 mg total) by mouth 3 (three) times a day as needed for cough, Disp: 20 capsule, Rfl: 0    ondansetron (ZOFRAN-ODT) 4 mg disintegrating tablet, Take 1 tablet (4 mg total) by mouth every 6 (six) hours as needed for nausea or vomiting, Disp: 20 tablet, Rfl: 0    predniSONE 50 mg tablet, Take 1 tablet (50 mg total) by mouth daily for 5 days, Disp: 5 tablet, Rfl: 0    Current Allergies     Allergies as of 08/10/2019 - Reviewed 08/10/2019   Allergen Reaction Noted    Ciprofloxacin Rash 03/14/2019            The following portions of the patient's history were reviewed and updated as appropriate: allergies, current medications, past family history, past medical history, past social history, past surgical history and problem list      Past Medical History:   Diagnosis Date    Foot fracture, right     Known health problems: none     Metatarsalgia     Self-mutilation     cutting        Past Surgical History:   Procedure Laterality Date    DENTAL SURGERY         Family History   Problem Relation Age of Onset    Bipolar disorder Brother     Gallbladder disease Family     Cancer Mother     No Known Problems Father          Medications have been verified          Objective   /80 (BP Location: Right arm, Patient Position: Sitting, Cuff Size: Standard)   Pulse 84   Temp 98 °F (36 7 °C) (Temporal)   Resp 20   Ht 5' 6" (1 676 m) Wt 74 8 kg (165 lb)   LMP 07/29/2019   SpO2 98%   BMI 26 63 kg/m²        Physical Exam     Physical Exam   Constitutional: She is oriented to person, place, and time  She appears well-developed and well-nourished  She does not have a sickly appearance  She appears ill  No distress  HENT:   Head: Normocephalic and atraumatic  Right Ear: Hearing, tympanic membrane, external ear and ear canal normal    Left Ear: Hearing, tympanic membrane, external ear and ear canal normal    Mouth/Throat: Uvula is midline, oropharynx is clear and moist and mucous membranes are normal  No oropharyngeal exudate, posterior oropharyngeal edema, posterior oropharyngeal erythema or tonsillar abscesses  Tonsils are 2+ on the right  Tonsils are 2+ on the left  No tonsillar exudate  Eyes: Conjunctivae are normal    Cardiovascular: Normal rate, regular rhythm and normal heart sounds  Pulmonary/Chest: Effort normal and breath sounds normal    Lymphadenopathy:     She has no cervical adenopathy  Neurological: She is alert and oriented to person, place, and time  Skin: Skin is warm and dry  She is not diaphoretic  Psychiatric: She has a normal mood and affect   Her behavior is normal

## 2019-08-23 ENCOUNTER — OFFICE VISIT (OUTPATIENT)
Dept: FAMILY MEDICINE CLINIC | Facility: CLINIC | Age: 29
End: 2019-08-23
Payer: COMMERCIAL

## 2019-08-23 VITALS
BODY MASS INDEX: 26.84 KG/M2 | HEIGHT: 66 IN | TEMPERATURE: 98.4 F | SYSTOLIC BLOOD PRESSURE: 108 MMHG | HEART RATE: 68 BPM | WEIGHT: 167 LBS | DIASTOLIC BLOOD PRESSURE: 76 MMHG

## 2019-08-23 DIAGNOSIS — J06.9 ACUTE UPPER RESPIRATORY INFECTION: ICD-10-CM

## 2019-08-23 DIAGNOSIS — R05.9 COUGH: ICD-10-CM

## 2019-08-23 DIAGNOSIS — B27.80 OTHER INFECTIOUS MONONUCLEOSIS WITHOUT COMPLICATION: Primary | ICD-10-CM

## 2019-08-23 PROBLEM — J01.90 ACUTE NON-RECURRENT SINUSITIS: Status: RESOLVED | Noted: 2019-04-26 | Resolved: 2019-08-23

## 2019-08-23 PROCEDURE — 1036F TOBACCO NON-USER: CPT | Performed by: PHYSICIAN ASSISTANT

## 2019-08-23 PROCEDURE — 3008F BODY MASS INDEX DOCD: CPT | Performed by: PHYSICIAN ASSISTANT

## 2019-08-23 PROCEDURE — 99214 OFFICE O/P EST MOD 30 MIN: CPT | Performed by: PHYSICIAN ASSISTANT

## 2019-08-23 RX ORDER — DEXTROMETHORPHAN HYDROBROMIDE AND PROMETHAZINE HYDROCHLORIDE 15; 6.25 MG/5ML; MG/5ML
5 SOLUTION ORAL 4 TIMES DAILY PRN
Qty: 118 ML | Refills: 1 | Status: SHIPPED | OUTPATIENT
Start: 2019-08-23 | End: 2019-09-02

## 2019-08-23 RX ORDER — AZITHROMYCIN 250 MG/1
TABLET, FILM COATED ORAL
Qty: 6 TABLET | Refills: 0 | Status: SHIPPED | OUTPATIENT
Start: 2019-08-23 | End: 2019-08-27

## 2019-08-23 RX ORDER — PREDNISONE 10 MG/1
TABLET ORAL
Qty: 25 TABLET | Refills: 0 | Status: SHIPPED | OUTPATIENT
Start: 2019-08-23 | End: 2019-10-25 | Stop reason: ALTCHOICE

## 2019-08-23 NOTE — PROGRESS NOTES
Assessment and Plan:    Problem List Items Addressed This Visit        Respiratory    Acute upper respiratory infection     Azithromycin as directed  Relevant Medications    predniSONE 10 mg tablet    azithromycin (ZITHROMAX) 250 mg tablet       Other    Cough     Phenergan as directed  Relevant Medications    predniSONE 10 mg tablet    Promethazine-DM (PHENERGAN-DM) 6 25-15 mg/5 mL oral syrup    Other infectious mononucleosis without complication - Primary     Prednisone taper as directed  Relevant Medications    predniSONE 10 mg tablet                 Diagnoses and all orders for this visit:    Other infectious mononucleosis without complication  -     predniSONE 10 mg tablet; Day 1= 50 mg at once, date 2+3= 40 mg once daily, day 4+5= 30 mg once daily, day 6+7= 20 mg once daily, and day 8+9= 10 mg once daily  Cough  -     predniSONE 10 mg tablet; Day 1= 50 mg at once, date 2+3= 40 mg once daily, day 4+5= 30 mg once daily, day 6+7= 20 mg once daily, and day 8+9= 10 mg once daily   -     Promethazine-DM (PHENERGAN-DM) 6 25-15 mg/5 mL oral syrup; Take 5 mL by mouth 4 (four) times a day as needed for cough for up to 10 days    Acute upper respiratory infection  -     predniSONE 10 mg tablet; Day 1= 50 mg at once, date 2+3= 40 mg once daily, day 4+5= 30 mg once daily, day 6+7= 20 mg once daily, and day 8+9= 10 mg once daily  -     azithromycin (ZITHROMAX) 250 mg tablet; Take two tablets on day one and then one tablet daily for the next four days  Subjective:      Patient ID: Bernardo West is a 26 y o  female  CC:    Chief Complaint   Patient presents with    Cough     productive cough x 3 weeks  Headaches  Pt notes she finished a course of amoxicillin last week  -  [facility]    Earache    Sore Throat    Fatigue    Nausea       HPI:      Starting on the beginning of this month patient started feeling really not well with a very sore throat    She went to urgent care and strep test was negative and culture was sent out which did end up being negative  She was given amoxicillin anyway and she did finish this  Blood work did show that she most likely has a resurgence of past mono with a elevated white count  She states that her she still has a really sore throat fatigued nausea and a productive cough of yellow mucus  Using tessalon and delsym  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  She also had prednisone 50 mg once daily for 5 days at the beginning of this month finishing on the 11th  The following portions of the patient's history were reviewed and updated as appropriate: allergies, current medications, past family history, past medical history, past social history, past surgical history and problem list       Review of Systems   Constitutional: Positive for fatigue  HENT: Positive for congestion and ear discharge  Eyes: Negative  Respiratory: Positive for cough  Cardiovascular: Negative  Gastrointestinal: Positive for nausea  Endocrine: Negative  Genitourinary: Negative  Musculoskeletal: Negative  Skin: Negative  Allergic/Immunologic: Negative  Neurological: Positive for headaches  Hematological: Negative  Psychiatric/Behavioral: Negative  Data to review:       Objective:    Vitals:    08/23/19 1004   BP: 108/76   BP Location: Left arm   Patient Position: Sitting   Cuff Size: Large   Pulse: 68   Temp: 98 4 °F (36 9 °C)   Weight: 75 8 kg (167 lb)   Height: 5' 6" (1 676 m)        Physical Exam   Constitutional: She is oriented to person, place, and time  She appears well-developed and well-nourished  No distress  HENT:   Head: Normocephalic and atraumatic  Right Ear: Hearing, tympanic membrane, external ear and ear canal normal    Left Ear: Hearing, tympanic membrane, external ear and ear canal normal    Nose: Nose normal    Mouth/Throat: Posterior oropharyngeal edema and posterior oropharyngeal erythema present  No oropharyngeal exudate  Eyes: Conjunctivae are normal  Right eye exhibits no discharge  Left eye exhibits no discharge  Neck: Neck supple  Carotid bruit is not present  Cardiovascular: Normal rate, regular rhythm and normal heart sounds  Exam reveals no gallop and no friction rub  No murmur heard  Pulmonary/Chest: Effort normal and breath sounds normal  No respiratory distress  She has no wheezes  She has no rales  Lymphadenopathy:     She has cervical adenopathy  Right cervical: Superficial cervical adenopathy present  Left cervical: Superficial cervical adenopathy present  Neurological: She is alert and oriented to person, place, and time  Skin: Skin is warm and dry  She is not diaphoretic  Psychiatric: She has a normal mood and affect  Judgment normal    Nursing note and vitals reviewed

## 2019-08-23 NOTE — PATIENT INSTRUCTIONS
Problem List Items Addressed This Visit        Respiratory    Acute upper respiratory infection     Azithromycin as directed  Relevant Medications    predniSONE 10 mg tablet    azithromycin (ZITHROMAX) 250 mg tablet       Other    Cough     Phenergan as directed  Relevant Medications    predniSONE 10 mg tablet    Promethazine-DM (PHENERGAN-DM) 6 25-15 mg/5 mL oral syrup    Other infectious mononucleosis without complication - Primary     Prednisone taper as directed           Relevant Medications    predniSONE 10 mg tablet

## 2019-10-25 ENCOUNTER — OFFICE VISIT (OUTPATIENT)
Dept: FAMILY MEDICINE CLINIC | Facility: CLINIC | Age: 29
End: 2019-10-25
Payer: COMMERCIAL

## 2019-10-25 VITALS
SYSTOLIC BLOOD PRESSURE: 100 MMHG | TEMPERATURE: 98.1 F | HEIGHT: 66 IN | BODY MASS INDEX: 27.48 KG/M2 | DIASTOLIC BLOOD PRESSURE: 72 MMHG | WEIGHT: 171 LBS | HEART RATE: 64 BPM

## 2019-10-25 DIAGNOSIS — H53.9 CHANGES IN VISION: ICD-10-CM

## 2019-10-25 DIAGNOSIS — G44.89 OTHER HEADACHE SYNDROME: Primary | ICD-10-CM

## 2019-10-25 DIAGNOSIS — H93.12 TINNITUS OF LEFT EAR: ICD-10-CM

## 2019-10-25 DIAGNOSIS — R11.0 NAUSEA: ICD-10-CM

## 2019-10-25 DIAGNOSIS — R42 DIZZINESS: ICD-10-CM

## 2019-10-25 PROBLEM — M25.571 PAIN IN LATERAL PORTION OF RIGHT ANKLE: Status: RESOLVED | Noted: 2018-12-14 | Resolved: 2019-10-25

## 2019-10-25 PROBLEM — R31.29 OTHER MICROSCOPIC HEMATURIA: Status: RESOLVED | Noted: 2018-01-31 | Resolved: 2019-10-25

## 2019-10-25 PROBLEM — R10.9 RIGHT FLANK PAIN: Status: RESOLVED | Noted: 2019-03-07 | Resolved: 2019-10-25

## 2019-10-25 PROBLEM — R05.9 COUGH: Status: RESOLVED | Noted: 2018-03-16 | Resolved: 2019-10-25

## 2019-10-25 PROBLEM — N20.0 NEPHROLITHIASIS: Status: ACTIVE | Noted: 2019-10-25

## 2019-10-25 PROBLEM — B27.80 OTHER INFECTIOUS MONONUCLEOSIS WITHOUT COMPLICATION: Status: RESOLVED | Noted: 2019-08-23 | Resolved: 2019-10-25

## 2019-10-25 PROBLEM — K59.00 CONSTIPATION: Status: RESOLVED | Noted: 2019-03-14 | Resolved: 2019-10-25

## 2019-10-25 PROBLEM — J06.9 ACUTE UPPER RESPIRATORY INFECTION: Status: RESOLVED | Noted: 2019-08-23 | Resolved: 2019-10-25

## 2019-10-25 PROBLEM — R10.9 ACUTE RIGHT FLANK PAIN: Status: RESOLVED | Noted: 2019-04-01 | Resolved: 2019-10-25

## 2019-10-25 PROBLEM — N20.0 RENAL CALCULUS, LEFT: Status: RESOLVED | Noted: 2019-04-01 | Resolved: 2019-10-25

## 2019-10-25 PROCEDURE — 3008F BODY MASS INDEX DOCD: CPT | Performed by: PHYSICIAN ASSISTANT

## 2019-10-25 PROCEDURE — 99214 OFFICE O/P EST MOD 30 MIN: CPT | Performed by: PHYSICIAN ASSISTANT

## 2019-10-25 NOTE — PATIENT INSTRUCTIONS
Problem List Items Addressed This Visit        Other    Other headache syndrome - Primary     Pt experiencing constant 2 week discomfort L posterior head around to her L face associated with vision changes and tinnitus  FH of brain tumor  Check CT temporal bones and inner ear and MRI brain per recommended protocol            Relevant Orders    MRI brain and orbits wo and w contrast    CT orbits/temporal bones/skull base w contrast    Tinnitus of left ear    Relevant Orders    MRI brain and orbits wo and w contrast    CT orbits/temporal bones/skull base w contrast    Dizziness    Relevant Orders    MRI brain and orbits wo and w contrast    CT orbits/temporal bones/skull base w contrast    Changes in vision    Relevant Orders    MRI brain and orbits wo and w contrast    CT orbits/temporal bones/skull base w contrast

## 2019-10-25 NOTE — PROGRESS NOTES
Assessment and Plan:    Problem List Items Addressed This Visit        Other    Other headache syndrome - Primary     Pt experiencing constant 2 week discomfort L posterior head around to her L face associated with vision changes and tinnitus  FH of brain tumor  Check CT temporal bones and inner ear and MRI brain per recommended protocol  Relevant Orders    MRI brain and orbits wo and w contrast    CT orbits/temporal bones/skull base w contrast    Tinnitus of left ear    Relevant Orders    MRI brain and orbits wo and w contrast    CT orbits/temporal bones/skull base w contrast    Dizziness    Relevant Orders    MRI brain and orbits wo and w contrast    CT orbits/temporal bones/skull base w contrast    Changes in vision    Relevant Orders    MRI brain and orbits wo and w contrast    CT orbits/temporal bones/skull base w contrast                 Diagnoses and all orders for this visit:    Other headache syndrome  -     MRI brain and orbits wo and w contrast; Future  -     CT orbits/temporal bones/skull base w contrast; Future    Tinnitus of left ear  -     MRI brain and orbits wo and w contrast; Future  -     CT orbits/temporal bones/skull base w contrast; Future    Dizziness  -     MRI brain and orbits wo and w contrast; Future  -     CT orbits/temporal bones/skull base w contrast; Future    Changes in vision  -     MRI brain and orbits wo and w contrast; Future  -     CT orbits/temporal bones/skull base w contrast; Future    Other orders  -     Cancel: CT head wo contrast; Future  -     Cancel: CT orbits/temporal bones/skull base wo contrast; Future              Subjective:      Patient ID: Dinesh Jain is a 26 y o  female  CC:    Chief Complaint   Patient presents with    Headache     Ache / ringing behind left ear x 2 weeks  "Feels like a headache, but it is not a headache"  Symptoms are constant rather than sporadic       Nausea     Occasional nausea that occurs when standing up after being in bending position  -  lsh       HPI:      Patient here today because the past 2 weeks she has had this constant discomfort that she states is not an actual headache from the left posterior head that wraps around around to the front of her ear associated with left sided vision changes, ringing in her ear light and sound sensitivity more than usual and dizziness  She did see the eye doctor at 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 but they presented totally differently  She has tried tylenol and NSAID with no relief  The following portions of the patient's history were reviewed and updated as appropriate: allergies, current medications, past family history, past medical history, past social history, past surgical history and problem list       Review of Systems   Constitutional: Negative  HENT: Positive for ear pain and tinnitus  Eyes: Negative  Respiratory: Negative  Cardiovascular: Negative  Gastrointestinal: Positive for nausea  Endocrine: Negative  Genitourinary: Negative  Musculoskeletal: Negative  Skin: Negative  Allergic/Immunologic: Negative  Neurological: Positive for dizziness and headaches  Hematological: Negative  Psychiatric/Behavioral: Negative  Data to review:       Objective:    Vitals:    10/25/19 0928   BP: 100/72   BP Location: Left arm   Patient Position: Sitting   Cuff Size: Large   Pulse: 64   Temp: 98 1 °F (36 7 °C)   TempSrc: Oral   Weight: 77 6 kg (171 lb)   Height: 5' 6" (1 676 m)        Physical Exam   Constitutional: She is oriented to person, place, and time  She appears well-developed and well-nourished  No distress  HENT:   Head: Normocephalic and atraumatic     Right Ear: Hearing, tympanic membrane, external ear and ear canal normal    Left Ear: Hearing, tympanic membrane, external ear and ear canal normal    Eyes: Conjunctivae, EOM and lids are normal  Right eye exhibits no discharge  Left eye exhibits no discharge  Neck: Neck supple  Carotid bruit is not present  Cardiovascular: Normal rate  Pulmonary/Chest: Effort normal  No respiratory distress  Neurological: She is alert and oriented to person, place, and time  Skin: Skin is warm and dry  She is not diaphoretic  Psychiatric: She has a normal mood and affect  Judgment normal    Nursing note and vitals reviewed

## 2019-10-25 NOTE — ASSESSMENT & PLAN NOTE
Pt experiencing constant 2 week discomfort L posterior head around to her L face associated with vision changes and tinnitus  FH of brain tumor  Check CT temporal bones and inner ear and MRI brain per recommended protocol

## 2019-10-28 RX ORDER — ONDANSETRON 4 MG/1
4 TABLET, ORALLY DISINTEGRATING ORAL EVERY 6 HOURS PRN
Qty: 20 TABLET | Refills: 0 | OUTPATIENT
Start: 2019-10-28

## 2019-10-31 ENCOUNTER — TELEPHONE (OUTPATIENT)
Dept: FAMILY MEDICINE CLINIC | Facility: CLINIC | Age: 29
End: 2019-10-31

## 2019-11-04 ENCOUNTER — TELEPHONE (OUTPATIENT)
Dept: FAMILY MEDICINE CLINIC | Facility: CLINIC | Age: 29
End: 2019-11-04

## 2019-11-04 ENCOUNTER — TELEPHONE (OUTPATIENT)
Dept: UROLOGY | Facility: CLINIC | Age: 29
End: 2019-11-04

## 2019-11-04 NOTE — TELEPHONE ENCOUNTER
Sorry- I just got a message that her [employer] will not cover her MRI either  Pt does however also have [employer] so she could keep her MRI apt for the 11th if she is OK with only having one insurance cover this test  Not sure if she has met her deductible or not this year? If pt decides not to go for the MRI I can refer her to the specialist  Thank you!

## 2019-11-04 NOTE — TELEPHONE ENCOUNTER
----- Message from Pantera Bell PA-C sent at 11/1/2019  9:57 AM EDT -----  Regarding: FW: CT IN PEER TO PEER  Please call pt and let her know her insurance will not cover her CT  I am recommending she still go for her MRI and then will review  If we still need the CT for her tinnitus I will refer her to ENT  Thank you!  (Please make this a chart note when done )    ----- Message -----  From: Jimy Mandel  Sent: 11/1/2019   7:09 AM EDT  To: Pantera Bell PA-C, Maegan Oleary, #  Subject: CT IN PEER TO PEER                               HiCHRISTINA is requesting additional information regarding this patient, as of now the clinical information does not support approval of CT  A peer to peer can be done by calling [phone], option # 3 and use tracking # [id number]  Peer to peers requests are good for 48-72 hours       Thank you,  Karrie

## 2019-11-07 NOTE — TELEPHONE ENCOUNTER
Please call pt  See task dated 11/4/19  Pt was supposed to be told her [employer] will not cover MRI but she also has [employer] so will need to decide if still wants to go for MRI with this coverage

## 2019-11-08 ENCOUNTER — HOSPITAL ENCOUNTER (OUTPATIENT)
Dept: MRI IMAGING | Facility: HOSPITAL | Age: 29
Discharge: HOME | End: 2019-11-08
Payer: COMMERCIAL

## 2019-11-08 DIAGNOSIS — H53.9 CHANGES IN VISION: ICD-10-CM

## 2019-11-08 DIAGNOSIS — H93.12 TINNITUS OF LEFT EAR: ICD-10-CM

## 2019-11-08 DIAGNOSIS — R42 DIZZINESS: ICD-10-CM

## 2019-11-08 DIAGNOSIS — G44.89 OTHER HEADACHE SYNDROME: ICD-10-CM

## 2019-11-08 PROCEDURE — 70553 MRI BRAIN STEM W/O & W/DYE: CPT

## 2019-11-08 PROCEDURE — A9585 GADOBUTROL INJECTION: HCPCS | Performed by: PHYSICIAN ASSISTANT

## 2019-11-08 PROCEDURE — 70543 MRI ORBT/FAC/NCK W/O &W/DYE: CPT

## 2019-11-08 RX ADMIN — GADOBUTROL 7 ML: 604.72 INJECTION INTRAVENOUS at 08:41

## 2019-11-12 DIAGNOSIS — H93.12 TINNITUS OF LEFT EAR: Primary | ICD-10-CM

## 2019-12-11 ENCOUNTER — TRANSCRIBE ORDERS (OUTPATIENT)
Dept: LAB | Facility: CLINIC | Age: 29
End: 2019-12-11

## 2019-12-11 ENCOUNTER — APPOINTMENT (OUTPATIENT)
Dept: LAB | Facility: CLINIC | Age: 29
End: 2019-12-11
Payer: COMMERCIAL

## 2019-12-11 DIAGNOSIS — IMO0001 ASYMMETRICAL HEARING LOSS OF LEFT EAR: ICD-10-CM

## 2019-12-11 PROCEDURE — 86618 LYME DISEASE ANTIBODY: CPT

## 2019-12-11 PROCEDURE — 36415 COLL VENOUS BLD VENIPUNCTURE: CPT

## 2019-12-12 DIAGNOSIS — R11.0 NAUSEA: ICD-10-CM

## 2019-12-12 LAB — B BURGDOR IGG+IGM SER-ACNC: <0.91 ISR (ref 0–0.9)

## 2019-12-13 RX ORDER — ONDANSETRON 4 MG/1
4 TABLET, ORALLY DISINTEGRATING ORAL EVERY 6 HOURS PRN
Qty: 20 TABLET | Refills: 0 | Status: SHIPPED | OUTPATIENT
Start: 2019-12-13 | End: 2020-11-23 | Stop reason: SDUPTHER

## 2020-01-01 NOTE — LETTER
February 22, 2018     Patient: Beth Davis   YOB: 1990   Date of Visit: 2/22/2018       To Whom it May Concern:    Beth Davis is under my professional care  She was seen in my office on 2/22/2018  She may return to work with limitations Patient must wear the boot  No more than 10 minutes of walking  May need to ice the ankle down periodically throughout the day  Patient may return to work on 02/23/2018  She may also return to school on this date as well  If you have any questions or concerns, please don't hesitate to call           Sincerely,          Lindsey Bang DO        CC: Beth Davis Statement Selected

## 2020-05-09 ENCOUNTER — TELEMEDICINE (OUTPATIENT)
Dept: FAMILY MEDICINE CLINIC | Facility: CLINIC | Age: 30
End: 2020-05-09
Payer: OTHER MISCELLANEOUS

## 2020-05-09 ENCOUNTER — TELEPHONE (OUTPATIENT)
Dept: OTHER | Facility: OTHER | Age: 30
End: 2020-05-09

## 2020-05-09 VITALS — TEMPERATURE: 101.8 F

## 2020-05-09 DIAGNOSIS — J02.9 SORE THROAT: ICD-10-CM

## 2020-05-09 DIAGNOSIS — Z20.828 EXPOSURE TO SARS-ASSOCIATED CORONAVIRUS: ICD-10-CM

## 2020-05-09 DIAGNOSIS — R50.9 FEVER, UNSPECIFIED FEVER CAUSE: Primary | ICD-10-CM

## 2020-05-09 DIAGNOSIS — R05.9 COUGH: ICD-10-CM

## 2020-05-09 DIAGNOSIS — R51.9 ACUTE NONINTRACTABLE HEADACHE, UNSPECIFIED HEADACHE TYPE: ICD-10-CM

## 2020-05-09 PROCEDURE — 99441 PR PHYS/QHP TELEPHONE EVALUATION 5-10 MIN: CPT | Performed by: FAMILY MEDICINE

## 2020-05-09 PROCEDURE — U0003 INFECTIOUS AGENT DETECTION BY NUCLEIC ACID (DNA OR RNA); SEVERE ACUTE RESPIRATORY SYNDROME CORONAVIRUS 2 (SARS-COV-2) (CORONAVIRUS DISEASE [COVID-19]), AMPLIFIED PROBE TECHNIQUE, MAKING USE OF HIGH THROUGHPUT TECHNOLOGIES AS DESCRIBED BY CMS-2020-01-R: HCPCS | Performed by: FAMILY MEDICINE

## 2020-05-11 DIAGNOSIS — Z20.828 EXPOSURE TO SARS-ASSOCIATED CORONAVIRUS: Primary | ICD-10-CM

## 2020-05-11 LAB — SARS-COV-2 RNA RESP QL NAA+PROBE: NEGATIVE

## 2020-05-13 ENCOUNTER — TELEMEDICINE (OUTPATIENT)
Dept: FAMILY MEDICINE CLINIC | Facility: CLINIC | Age: 30
End: 2020-05-13
Payer: OTHER MISCELLANEOUS

## 2020-05-13 VITALS
BODY MASS INDEX: 27.27 KG/M2 | HEIGHT: 66 IN | TEMPERATURE: 100.1 F | HEART RATE: 65 BPM | WEIGHT: 169.7 LBS | OXYGEN SATURATION: 96 %

## 2020-05-13 DIAGNOSIS — Z20.822 SUSPECTED COVID-19 VIRUS INFECTION: Primary | ICD-10-CM

## 2020-05-13 PROCEDURE — 99213 OFFICE O/P EST LOW 20 MIN: CPT | Performed by: PHYSICIAN ASSISTANT

## 2020-05-15 ENCOUNTER — TELEMEDICINE (OUTPATIENT)
Dept: FAMILY MEDICINE CLINIC | Facility: CLINIC | Age: 30
End: 2020-05-15
Payer: OTHER MISCELLANEOUS

## 2020-05-15 VITALS — TEMPERATURE: 99.7 F

## 2020-05-15 DIAGNOSIS — Z20.822 SUSPECTED COVID-19 VIRUS INFECTION: Primary | ICD-10-CM

## 2020-05-15 PROCEDURE — 99213 OFFICE O/P EST LOW 20 MIN: CPT | Performed by: PHYSICIAN ASSISTANT

## 2020-05-16 ENCOUNTER — TELEMEDICINE (OUTPATIENT)
Dept: FAMILY MEDICINE CLINIC | Facility: CLINIC | Age: 30
End: 2020-05-16
Payer: OTHER MISCELLANEOUS

## 2020-05-16 VITALS — TEMPERATURE: 100.9 F | OXYGEN SATURATION: 100 %

## 2020-05-16 DIAGNOSIS — Z20.822 SUSPECTED COVID-19 VIRUS INFECTION: Primary | ICD-10-CM

## 2020-05-16 PROCEDURE — 99213 OFFICE O/P EST LOW 20 MIN: CPT | Performed by: PHYSICIAN ASSISTANT

## 2020-05-17 ENCOUNTER — TELEMEDICINE (OUTPATIENT)
Dept: FAMILY MEDICINE CLINIC | Facility: CLINIC | Age: 30
End: 2020-05-17
Payer: OTHER MISCELLANEOUS

## 2020-05-17 VITALS — TEMPERATURE: 101 F | OXYGEN SATURATION: 96 %

## 2020-05-17 DIAGNOSIS — Z20.822 SUSPECTED COVID-19 VIRUS INFECTION: Primary | ICD-10-CM

## 2020-05-17 PROCEDURE — 99212 OFFICE O/P EST SF 10 MIN: CPT | Performed by: PHYSICIAN ASSISTANT

## 2020-05-18 ENCOUNTER — TELEMEDICINE (OUTPATIENT)
Dept: FAMILY MEDICINE CLINIC | Facility: CLINIC | Age: 30
End: 2020-05-18
Payer: OTHER MISCELLANEOUS

## 2020-05-18 VITALS — OXYGEN SATURATION: 98 % | TEMPERATURE: 100.5 F

## 2020-05-18 DIAGNOSIS — Z20.822 SUSPECTED COVID-19 VIRUS INFECTION: Primary | ICD-10-CM

## 2020-05-18 PROCEDURE — 99213 OFFICE O/P EST LOW 20 MIN: CPT | Performed by: PHYSICIAN ASSISTANT

## 2020-05-19 ENCOUNTER — TELEMEDICINE (OUTPATIENT)
Dept: FAMILY MEDICINE CLINIC | Facility: CLINIC | Age: 30
End: 2020-05-19
Payer: COMMERCIAL

## 2020-05-19 VITALS — TEMPERATURE: 99.4 F | OXYGEN SATURATION: 98 %

## 2020-05-19 DIAGNOSIS — Z20.822 SUSPECTED COVID-19 VIRUS INFECTION: Primary | ICD-10-CM

## 2020-05-19 PROCEDURE — 99213 OFFICE O/P EST LOW 20 MIN: CPT | Performed by: PHYSICIAN ASSISTANT

## 2020-05-19 RX ORDER — ALBUTEROL SULFATE 90 UG/1
2 AEROSOL, METERED RESPIRATORY (INHALATION) EVERY 4 HOURS PRN
Qty: 1 INHALER | Refills: 3 | Status: SHIPPED | OUTPATIENT
Start: 2020-05-19 | End: 2020-12-14

## 2020-05-20 ENCOUNTER — TELEMEDICINE (OUTPATIENT)
Dept: FAMILY MEDICINE CLINIC | Facility: CLINIC | Age: 30
End: 2020-05-20
Payer: OTHER MISCELLANEOUS

## 2020-05-20 VITALS — TEMPERATURE: 99.4 F

## 2020-05-20 DIAGNOSIS — Z20.822 SUSPECTED COVID-19 VIRUS INFECTION: Primary | ICD-10-CM

## 2020-05-20 PROCEDURE — 99213 OFFICE O/P EST LOW 20 MIN: CPT | Performed by: PHYSICIAN ASSISTANT

## 2020-05-21 ENCOUNTER — TELEMEDICINE (OUTPATIENT)
Dept: FAMILY MEDICINE CLINIC | Facility: CLINIC | Age: 30
End: 2020-05-21
Payer: OTHER MISCELLANEOUS

## 2020-05-21 DIAGNOSIS — Z20.822 SUSPECTED COVID-19 VIRUS INFECTION: Primary | ICD-10-CM

## 2020-05-21 PROCEDURE — 99213 OFFICE O/P EST LOW 20 MIN: CPT | Performed by: FAMILY MEDICINE

## 2020-05-22 ENCOUNTER — TELEMEDICINE (OUTPATIENT)
Dept: FAMILY MEDICINE CLINIC | Facility: CLINIC | Age: 30
End: 2020-05-22
Payer: OTHER MISCELLANEOUS

## 2020-05-22 DIAGNOSIS — Z20.822 SUSPECTED COVID-19 VIRUS INFECTION: Primary | ICD-10-CM

## 2020-05-22 PROCEDURE — 99213 OFFICE O/P EST LOW 20 MIN: CPT | Performed by: FAMILY MEDICINE

## 2020-05-23 ENCOUNTER — TELEMEDICINE (OUTPATIENT)
Dept: FAMILY MEDICINE CLINIC | Facility: CLINIC | Age: 30
End: 2020-05-23
Payer: OTHER MISCELLANEOUS

## 2020-05-23 VITALS — TEMPERATURE: 99.7 F | OXYGEN SATURATION: 99 %

## 2020-05-23 DIAGNOSIS — Z20.822 SUSPECTED COVID-19 VIRUS INFECTION: Primary | ICD-10-CM

## 2020-05-23 PROCEDURE — 99213 OFFICE O/P EST LOW 20 MIN: CPT | Performed by: PHYSICIAN ASSISTANT

## 2020-05-26 ENCOUNTER — TELEMEDICINE (OUTPATIENT)
Dept: FAMILY MEDICINE CLINIC | Facility: CLINIC | Age: 30
End: 2020-05-26
Payer: OTHER MISCELLANEOUS

## 2020-05-26 DIAGNOSIS — Z20.822 SUSPECTED COVID-19 VIRUS INFECTION: Primary | ICD-10-CM

## 2020-05-26 PROCEDURE — 99213 OFFICE O/P EST LOW 20 MIN: CPT | Performed by: PHYSICIAN ASSISTANT

## 2020-07-06 ENCOUNTER — TELEMEDICINE (OUTPATIENT)
Dept: FAMILY MEDICINE CLINIC | Facility: CLINIC | Age: 30
End: 2020-07-06
Payer: COMMERCIAL

## 2020-07-06 DIAGNOSIS — Z20.822 SUSPECTED COVID-19 VIRUS INFECTION: Primary | ICD-10-CM

## 2020-07-06 PROCEDURE — 1036F TOBACCO NON-USER: CPT | Performed by: PHYSICIAN ASSISTANT

## 2020-07-06 PROCEDURE — 99213 OFFICE O/P EST LOW 20 MIN: CPT | Performed by: PHYSICIAN ASSISTANT

## 2020-07-06 NOTE — ASSESSMENT & PLAN NOTE
Patient had false negative COVID infection with all the classic symptoms and has since been released from quarantine and has been working back as a nurse with [employer]   She states the only residual symptom is shortness of breath with trying to actually run which is a normal activity for her  She does use her inhaler as needed  At this time she is wanting antibody testing and is aware of all the pros and cons and the fact that insurance may not cover it  This was ordered for her today and I will call with results  I did order the total with reflex option

## 2020-07-06 NOTE — PROGRESS NOTES
Virtual Regular Visit      Assessment/Plan:    Problem List Items Addressed This Visit        Other    Suspected Covid-19 Virus Infection - Primary     Patient had false negative COVID infection with all the classic symptoms and has since been released from quarantine and has been working back as a nurse with [employer]   She states the only residual symptom is shortness of breath with trying to actually run which is a normal activity for her  She does use her inhaler as needed  At this time she is wanting antibody testing and is aware of all the pros and cons and the fact that insurance may not cover it  This was ordered for her today and I will call with results  I did order the total with reflex option  Relevant Orders    SARS-CoV2 Antibody, Total (IgG, IgA, IgM) [facility]               Reason for visit is   Chief Complaint   Patient presents with    Virtual Regular Visit        Encounter provider Piotr Gómez PA-C    Provider located at [address] [place] [address] [address] [facility] [address]      Recent Visits  No visits were found meeting these conditions  Showing recent visits within past 7 days and meeting all other requirements     Today's Visits  Date Type Provider Dept   [facility]   Showing today's visits and meeting all other requirements     Future Appointments  No visits were found meeting these conditions  Showing future appointments within next 150 days and meeting all other requirements        The patient was identified by name and date of birth  Gabbie Jah was informed that this is a telemedicine visit and that the visit is being conducted through [employer] and patient was informed that this is not a secure, HIPAA-complaint platform  She agrees to proceed     My office door was closed  No one else was in the room    She acknowledged consent and understanding of privacy and security of the video platform  The patient has agreed to participate and understands they can discontinue the visit at any time  Patient is aware this is a billable service  Subjective  Timothy Waldron is a 26 y o  female pt who did    This past spring patient had false COVID negative COVID infection with complete symptoms including fatigue fever loss of taste smell classic COVID rash shortness of breath low pulse ox with activity  Patient resolved was cleared 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  She is interested at this time and getting antibody testing  She understands all of the pros and cons about testing and still would like to proceed even though it may not be covered by her insurance  Past Medical History:   Diagnosis Date    Foot fracture, right     Known health problems: none     Metatarsalgia     Self-mutilation     cutting        Past Surgical History:   Procedure Laterality Date    DENTAL SURGERY         Current Outpatient Medications   Medication Sig Dispense Refill    albuterol (PROVENTIL HFA,VENTOLIN HFA) 90 mcg/act inhaler Inhale 2 puffs every 4 (four) hours as needed for wheezing 1 Inhaler 3    ondansetron (ZOFRAN-ODT) 4 mg disintegrating tablet Take 1 tablet (4 mg total) by mouth every 6 (six) hours as needed for nausea or vomiting 20 tablet 0     No current facility-administered medications for this visit  Allergies   Allergen Reactions    Ciprofloxacin Rash       Review of Systems   Constitutional: Negative  HENT: Negative  Eyes: Negative  Respiratory: Negative  Cardiovascular: Negative  Gastrointestinal: Negative  Endocrine: Negative  Genitourinary: Negative  Musculoskeletal: Negative  Skin: Negative  Allergic/Immunologic: Negative  Neurological: Negative  Hematological: Negative  Psychiatric/Behavioral: Negative  Video Exam    There were no vitals filed for this visit      Physical Exam   Constitutional: She is oriented to person, place, and time  She appears well-developed and well-nourished  No distress  Eyes: Conjunctivae are normal  Right eye exhibits no discharge  Left eye exhibits no discharge  Pulmonary/Chest: Effort normal  No respiratory distress  Neurological: She is alert and oriented to person, place, and time  Skin: She is not diaphoretic  Psychiatric: She has a normal mood and affect  Her behavior is normal  Judgment and thought content normal         As a result of this visit, I have not referred the patient for further respiratory evaluation  I spent 10 minutes directly with the patient during this visit      VIRTUAL VISIT DISCLAIMER    Óscar Quezada acknowledges that she has consented to an online visit or consultation  She understands that the online visit is based solely on information provided by her, and that, in the absence of a face-to-face physical evaluation by the physician, the diagnosis she receives is both limited and provisional in terms of accuracy and completeness  This is not intended to replace a full medical face-to-face evaluation by the physician  Óscar Quezada understands and accepts these terms

## 2020-07-06 NOTE — PATIENT INSTRUCTIONS
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

## 2020-08-07 ENCOUNTER — OFFICE VISIT (OUTPATIENT)
Dept: FAMILY MEDICINE CLINIC | Facility: CLINIC | Age: 30
End: 2020-08-07
Payer: COMMERCIAL

## 2020-08-07 ENCOUNTER — TRANSCRIBE ORDERS (OUTPATIENT)
Dept: LAB | Facility: CLINIC | Age: 30
End: 2020-08-07

## 2020-08-07 ENCOUNTER — APPOINTMENT (OUTPATIENT)
Dept: LAB | Facility: CLINIC | Age: 30
End: 2020-08-07
Payer: COMMERCIAL

## 2020-08-07 VITALS
DIASTOLIC BLOOD PRESSURE: 76 MMHG | WEIGHT: 177 LBS | TEMPERATURE: 98.2 F | HEART RATE: 70 BPM | SYSTOLIC BLOOD PRESSURE: 108 MMHG | HEIGHT: 66 IN | BODY MASS INDEX: 28.45 KG/M2

## 2020-08-07 DIAGNOSIS — K42.9 UMBILICAL HERNIA WITHOUT OBSTRUCTION AND WITHOUT GANGRENE: ICD-10-CM

## 2020-08-07 DIAGNOSIS — N30.00 ACUTE CYSTITIS WITHOUT HEMATURIA: ICD-10-CM

## 2020-08-07 DIAGNOSIS — Z12.4 CERVICAL CANCER SCREENING: Primary | ICD-10-CM

## 2020-08-07 DIAGNOSIS — Z20.822 SUSPECTED COVID-19 VIRUS INFECTION: ICD-10-CM

## 2020-08-07 DIAGNOSIS — R30.0 DYSURIA: ICD-10-CM

## 2020-08-07 DIAGNOSIS — R39.15 URINARY URGENCY: ICD-10-CM

## 2020-08-07 DIAGNOSIS — K44.9 HIATAL HERNIA: ICD-10-CM

## 2020-08-07 LAB
SL AMB  POCT GLUCOSE, UA: NEGATIVE
SL AMB LEUKOCYTE ESTERASE,UA: NEGATIVE
SL AMB POCT BILIRUBIN,UA: NEGATIVE
SL AMB POCT BLOOD,UA: NEGATIVE
SL AMB POCT CLARITY,UA: CLEAR
SL AMB POCT COLOR,UA: YELLOW
SL AMB POCT KETONES,UA: NEGATIVE
SL AMB POCT NITRITE,UA: NEGATIVE
SL AMB POCT PH,UA: >9
SL AMB POCT SPECIFIC GRAVITY,UA: 1.02
SL AMB POCT URINE PROTEIN: NEGATIVE
SL AMB POCT UROBILINOGEN: 1

## 2020-08-07 PROCEDURE — 81002 URINALYSIS NONAUTO W/O SCOPE: CPT | Performed by: PHYSICIAN ASSISTANT

## 2020-08-07 PROCEDURE — 3008F BODY MASS INDEX DOCD: CPT | Performed by: PHYSICIAN ASSISTANT

## 2020-08-07 PROCEDURE — 1036F TOBACCO NON-USER: CPT | Performed by: PHYSICIAN ASSISTANT

## 2020-08-07 PROCEDURE — 87147 CULTURE TYPE IMMUNOLOGIC: CPT | Performed by: PHYSICIAN ASSISTANT

## 2020-08-07 PROCEDURE — 36415 COLL VENOUS BLD VENIPUNCTURE: CPT

## 2020-08-07 PROCEDURE — 87086 URINE CULTURE/COLONY COUNT: CPT | Performed by: PHYSICIAN ASSISTANT

## 2020-08-07 PROCEDURE — 86769 SARS-COV-2 COVID-19 ANTIBODY: CPT

## 2020-08-07 PROCEDURE — 99214 OFFICE O/P EST MOD 30 MIN: CPT | Performed by: PHYSICIAN ASSISTANT

## 2020-08-07 RX ORDER — SULFAMETHOXAZOLE AND TRIMETHOPRIM 800; 160 MG/1; MG/1
1 TABLET ORAL EVERY 12 HOURS SCHEDULED
Qty: 20 TABLET | Refills: 0 | Status: SHIPPED | OUTPATIENT
Start: 2020-08-07 | End: 2020-08-17

## 2020-08-07 RX ORDER — LORATADINE 10 MG/1
10 TABLET ORAL DAILY
COMMUNITY
End: 2021-11-16

## 2020-08-07 NOTE — PATIENT INSTRUCTIONS
Problem List Items Addressed This Visit     None      Visit Diagnoses     Cervical cancer screening    -  Primary    Urinary urgency        Relevant Orders    POCT urine dip    Urine culture    Dysuria        Relevant Orders    POCT urine dip    Urine culture

## 2020-08-07 NOTE — PROGRESS NOTES
Assessment and Plan:    Problem List Items Addressed This Visit        Genitourinary    Acute cystitis without hematuria     In-house urine dip was negative for abnormality however patient does have classic symptoms  Start empiric Bactrim double strength 1 twice daily for 10 days  Will send for culture and only call patient if we need to change her antibiotic  Patient was given 10 days worth because of flank discomfort but if she feels within normal limits after 7 days May  At that time  Increase fluids  Other    Hiatal hernia     Incidentally noted on CT in 2019  Umbilical hernia     This is visible on patient exam and positive on CT done on 2019  Refer to general surgeon  Relevant Orders    Ambulatory referral to General Surgery      Other Visit Diagnoses     Cervical cancer screening    -  Primary    Relevant Orders    Ambulatory referral to Gynecology    Urinary urgency        Relevant Medications    sulfamethoxazole-trimethoprim (BACTRIM DS) 800-160 mg per tablet    Other Relevant Orders    POCT urine dip (Completed)    Urine culture    Dysuria        Relevant Medications    sulfamethoxazole-trimethoprim (BACTRIM DS) 800-160 mg per tablet    Other Relevant Orders    POCT urine dip (Completed)    Urine culture                 Diagnoses and all orders for this visit:    Cervical cancer screening  -     Ambulatory referral to Gynecology; Future    Urinary urgency  -     POCT urine dip  -     Urine culture  -     sulfamethoxazole-trimethoprim (BACTRIM DS) 800-160 mg per tablet; Take 1 tablet by mouth every 12 (twelve) hours for 10 days    Dysuria  -     POCT urine dip  -     Urine culture  -     sulfamethoxazole-trimethoprim (BACTRIM DS) 800-160 mg per tablet; Take 1 tablet by mouth every 12 (twelve) hours for 10 days    Hiatal hernia    Umbilical hernia without obstruction and without gangrene  -     Ambulatory referral to General Surgery;  Future    Acute cystitis without hematuria    Other orders  -     loratadine (CLARITIN) 10 mg tablet; Take 10 mg by mouth daily              Subjective:      Patient ID: Alissa Frank is a 31 y o  female  CC:    Chief Complaint   Patient presents with    Urinary Urgency     Urinary urgency, some dysuria, right flank pain that started Wednesday  -  lsh       HPI:    Patient here today because for the past few days since Wednesday she has had urinary tract infection like symptoms  She does have history of kidney stones last was 1 year ago on the left and 2 mm nonobstructing  She was supposed to follow-up with a [facility] however COVID happened and she never went  When she gets infections she states that she normally gets pyelonephritis like discomfort in her flank and she is also currently experiencing some slight nausea no fever pelvic discomfort urinary frequency and burning with urination as well  The following portions of the patient's history were reviewed and updated as appropriate: allergies, current medications, past family history, past medical history, past social history, past surgical history and problem list       Review of Systems   Constitutional: Negative  HENT: Negative  Eyes: Negative  Respiratory: Negative  Cardiovascular: Negative  Gastrointestinal: Negative  Endocrine: Negative  Genitourinary: Positive for flank pain, frequency and urgency  Skin: Negative  Allergic/Immunologic: Negative  Neurological: Negative  Hematological: Negative  Psychiatric/Behavioral: Negative  Data to review:       Objective:    Vitals:    08/07/20 1441   BP: 108/76   BP Location: Left arm   Patient Position: Sitting   Cuff Size: Large   Pulse: 70   Temp: 98 2 °F (36 8 °C)   TempSrc: Temporal   Weight: 80 3 kg (177 lb)   Height: 5' 5 5" (1 664 m)        Physical Exam  Vitals signs and nursing note reviewed  Constitutional:       General: She is not in acute distress       Appearance: She is well-developed  She is not diaphoretic  HENT:      Head: Normocephalic and atraumatic  Eyes:      General:         Right eye: No discharge  Left eye: No discharge  Conjunctiva/sclera: Conjunctivae normal    Neck:      Musculoskeletal: Neck supple  Vascular: No carotid bruit  Cardiovascular:      Rate and Rhythm: Normal rate and regular rhythm  Heart sounds: Normal heart sounds  No murmur  No friction rub  No gallop  Pulmonary:      Effort: Pulmonary effort is normal  No respiratory distress  Breath sounds: Normal breath sounds  No wheezing or rales  Abdominal:      General: Abdomen is flat  Bowel sounds are normal  There is no distension or abdominal bruit  There are signs of injury  Palpations: Abdomen is soft  There is no shifting dullness, fluid wave, hepatomegaly, splenomegaly, mass or pulsatile mass  Tenderness: There is abdominal tenderness in the suprapubic area  There is right CVA tenderness  Skin:     General: Skin is warm and dry  Neurological:      Mental Status: She is alert and oriented to person, place, and time  Psychiatric:         Judgment: Judgment normal            BMI Counseling: Body mass index is 29 01 kg/m²  The BMI is above normal  Nutrition recommendations include decreasing portion sizes, encouraging healthy choices of fruits and vegetables, decreasing fast food intake, consuming healthier snacks, limiting drinks that contain sugar, moderation in carbohydrate intake, increasing intake of lean protein, reducing intake of saturated and trans fat and reducing intake of cholesterol  Exercise recommendations include exercising 3-5 times per week  No pharmacotherapy was ordered

## 2020-08-07 NOTE — ASSESSMENT & PLAN NOTE
In-house urine dip was negative for abnormality however patient does have classic symptoms  Start empiric Bactrim double strength 1 twice daily for 10 days  Will send for culture and only call patient if we need to change her antibiotic  Patient was given 10 days worth because of flank discomfort but if she feels within normal limits after 7 days May  At that time  Increase fluids

## 2020-08-08 LAB
BACTERIA UR CULT: ABNORMAL
BACTERIA UR CULT: ABNORMAL
SARS-COV-2 IGG+IGM SERPL QL IA: NORMAL

## 2020-08-10 NOTE — RESULT ENCOUNTER NOTE
Please let the patient know that her urine culture was slightly positive for a bacteria and that the antibiotic she is taking should work for this  She is to finish her antibiotic course and let us know if her symptoms do not resolve  Also please tell her that her COVID antibody was negative which we all know is very interesting because she had all the classic symptoms of COVID with a negative PCR! Thank you

## 2020-08-11 ENCOUNTER — TELEPHONE (OUTPATIENT)
Dept: FAMILY MEDICINE CLINIC | Facility: CLINIC | Age: 30
End: 2020-08-11

## 2020-08-11 NOTE — TELEPHONE ENCOUNTER
----- Message from Dasia Mosley sent at 8/11/2020 10:21 AM EDT -----  Regarding: Test Results Question  Contact: [phone]  Adolfo Laboy! Hope you're doing well  I missed a call from your office yesterday regarding the test results, I've tried to call back multiple times yesterday and today- but I haven't gotten a hold of anyone to transfer me to nursing  Do we have to change antibiotics? Thanks, Del        Pt notified she should finish the antibiotic prescribed and to call if symptoms do not resolve  --bb

## 2020-09-09 ENCOUNTER — OFFICE VISIT (OUTPATIENT)
Dept: URGENT CARE | Age: 30
End: 2020-09-09
Payer: COMMERCIAL

## 2020-09-09 VITALS
RESPIRATION RATE: 18 BRPM | OXYGEN SATURATION: 98 % | SYSTOLIC BLOOD PRESSURE: 129 MMHG | HEART RATE: 68 BPM | DIASTOLIC BLOOD PRESSURE: 82 MMHG | TEMPERATURE: 98 F

## 2020-09-09 DIAGNOSIS — S99.911A INJURY OF RIGHT ANKLE, INITIAL ENCOUNTER: Primary | ICD-10-CM

## 2020-09-09 PROCEDURE — G0382 LEV 3 HOSP TYPE B ED VISIT: HCPCS | Performed by: NURSE PRACTITIONER

## 2020-09-09 NOTE — PROGRESS NOTES
[address] [facility]        NAME: Ke Blackwell is a 31 y o  female  : 1990    MRN: [id number]  DATE: 2020  TIME: 12:20 PM    Assessment and Plan   Injury of right ankle, initial encounter [S99 911A]  1  Injury of right ankle, initial encounter  XR foot 3+ vw right    XR ankle 3+ vw right         Patient Instructions     Over-the-counter medication for pain p r n  Keep appointment with orthopedic specialist in 1 week as scheduled  Ok to use boot at work  Follow up with PCP in 3-5 days  Proceed to  ER if symptoms worsen  Chief Complaint     Chief Complaint   Patient presents with    Ankle Injury     x 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, 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 , was squatting up and down , felt pain  reports of previous inury to area, appt  with Dr Emerald Jeter next wednesday per pt  , pt requesting work note to be able to work   History of Present Illness       HPI   Reports pain on the right/ankle foot  Previous history of injury on this foot  Several months ago used boot which helped with the pain; 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, and was squatting  She did that for about 3 hours  Started having pain aching on the right foot  Call the specialist and was told to come in next week  Was advised to use the boot  Reports that her manager is requesting note for work so she can use the boot while at work  Reports mild pain, 3/10, with no radiation  Achy pain  Reports use of the boot helps with the pain  Review of Systems   Review of Systems   Constitutional: Negative for chills and fever  Musculoskeletal: Positive for gait problem ( because of slight been on the left foot)  Negative for joint swelling  Myalgias: right foot pain as described above  Skin: Negative for color change, rash and wound  Neurological: Negative for weakness and numbness           Current Medications       Current Outpatient Medications:     albuterol (PROVENTIL HFA,VENTOLIN HFA) 90 mcg/act inhaler, Inhale 2 puffs every 4 (four) hours as needed for wheezing, Disp: 1 Inhaler, Rfl: 3    loratadine (CLARITIN) 10 mg tablet, Take 10 mg by mouth daily, Disp: , Rfl:     ondansetron (ZOFRAN-ODT) 4 mg disintegrating tablet, Take 1 tablet (4 mg total) by mouth every 6 (six) hours as needed for nausea or vomiting, Disp: 20 tablet, Rfl: 0    Current Allergies     Allergies as of 09/09/2020 - Reviewed 09/09/2020   Allergen Reaction Noted    Ciprofloxacin Rash 03/14/2019            The following portions of the patient's history were reviewed and updated as appropriate: allergies, current medications, past family history, past medical history, past social history, past surgical history and problem list      Past Medical History:   Diagnosis Date    Foot fracture, right     Known health problems: none     Metatarsalgia     Self-mutilation     cutting        Past Surgical History:   Procedure Laterality Date    DENTAL SURGERY         Family History   Problem Relation Age of Onset    Bipolar disorder Brother     Gallbladder disease Family     Cancer Mother     No Known Problems Father          Medications have been verified  Objective   /82   Pulse 68   Temp 98 °F (36 7 °C)   Resp 18   LMP 08/26/2020   SpO2 98%        Physical Exam     Physical Exam  Constitutional:       Appearance: Normal appearance  Musculoskeletal:         General: Tenderness (Mild tenderness with palpation of the lateral and medial aspect of the right foot  No redness or swelling  Movement of the toes on R ankle are normal   No bruising  No swelling ) present  No swelling  Comments: Slight limp, favoring the left foot  Neurological:      Mental Status: She is alert

## 2020-09-09 NOTE — LETTER
September 9, 2020     Patient: Mikhail Graves   YOB: 1990   Date of Visit: 9/9/2020       To Whom it May Concern:    Mikhail Graves was seen in my clinic on 9/9/2020  She may return to work on 09/09/2020  If you have any questions or concerns, please don't hesitate to call           Sincerely,          ADAM Miramontes        CC: No Recipients

## 2020-09-10 ENCOUNTER — OFFICE VISIT (OUTPATIENT)
Dept: OBGYN CLINIC | Facility: CLINIC | Age: 30
End: 2020-09-10
Payer: COMMERCIAL

## 2020-09-10 VITALS
WEIGHT: 181 LBS | SYSTOLIC BLOOD PRESSURE: 124 MMHG | HEIGHT: 66 IN | HEART RATE: 98 BPM | DIASTOLIC BLOOD PRESSURE: 76 MMHG | BODY MASS INDEX: 29.09 KG/M2

## 2020-09-10 DIAGNOSIS — M76.71 PERONEAL TENDONITIS, RIGHT: Primary | ICD-10-CM

## 2020-09-10 DIAGNOSIS — M76.821 TIBIALIS POSTERIOR TENDINITIS, RIGHT: ICD-10-CM

## 2020-09-10 PROCEDURE — 99213 OFFICE O/P EST LOW 20 MIN: CPT | Performed by: ORTHOPAEDIC SURGERY

## 2020-09-10 RX ORDER — METHYLPREDNISOLONE 4 MG/1
TABLET ORAL
Qty: 21 TABLET | Refills: 0 | Status: SHIPPED | OUTPATIENT
Start: 2020-09-10 | End: 2020-12-14

## 2020-09-10 NOTE — LETTER
September 10, 2020     Patient: Milas Sicard   YOB: 1990   Date of Visit: 9/10/2020       To Whom it May Concern:    Milas Sicard is under my professional care  She was seen in my office on 9/10/2020  She has medical excuse for missed time at work  Please allow to wear CAM boot while working until medically cleared  If you have any questions or concerns, please don't hesitate to call           Sincerely,          Delores Dunbar, DO        CC: No Recipients

## 2020-09-10 NOTE — PROGRESS NOTES
Assessment:  1  Peroneal tendonitis, right  Ambulatory referral to Physical Therapy    Cam Boot    methylPREDNISolone 4 MG tablet therapy pack   2  Tibialis posterior tendinitis, right  Ambulatory referral to Physical Therapy    Cam Boot    methylPREDNISolone 4 MG tablet therapy pack     Patient Active Problem List   Diagnosis    Cuboid syndrome of right foot    Pain in right foot    Peroneal tendonitis, right    Postural hypotension    Overweight (BMI 25 0-29  9)    Nephrolithiasis    Other headache syndrome    Tinnitus of left ear    Dizziness    Changes in vision    Suspected Covid-19 Virus Infection    Hiatal hernia    Umbilical hernia    Acute cystitis without hematuria           Plan      Patient has a reoccurrence of peroneal tendonitis along with posterior tibialis tendonitis as a result of repetitive squatting right ankle, foot  She will continue in CAM boot, new one given today  She will start physical therapy and have Anne Rojas make custom orthotic as she has calcaneal varus with pronation  Negative tibialis dysfunction test on exam  Medrol dose blayne to phx, continue with tylenol as well  Avoid repetitive activities  See patient back in 6 weeks  Subjective:     Patient ID:    Chief Complaint:Benrizwana Dyer 31 y o  female      HPI    Patient comes in today with regards to her right ankle, foot  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-4 hrs  Following she gradually had progression of pain lateral and medial ankle, foot region  She did go to [facility] 9/9/20, had this appt set up in advance  She has had peroneal tendonitis in past and treated in CAM boot in combination with PT  She has been wearing boot and working in boot  Pain is controlled in boot  Diffuse pain around ankle when walking out of boot  She is only using tylenol for pain control         The following portions of the patient's history were reviewed and updated as appropriate: allergies, current medications, past family history, past social history, past surgical history and problem list         Social History     Socioeconomic History    Marital status: Single     Spouse name: Not on file    Number of children: Not on file    Years of education: Not on file    Highest education level: Not on file   Occupational History    Occupation: employed    Social Needs    Financial resource strain: Not on file    Food insecurity     Worry: Not on file     Inability: Not on file    Transportation needs     Medical: Not on file     Non-medical: Not on file   Tobacco Use    Smoking status: Never Smoker    Smokeless tobacco: Never Used   Substance and Sexual Activity    Alcohol use: Yes     Frequency: 2-4 times a month     Drinks per session: 1 or 2     Binge frequency: Less than monthly     Comment: occasional - social     Drug use: No    Sexual activity: Not on file   Lifestyle    Physical activity     Days per week: Not on file     Minutes per session: Not on file    Stress: Not on file   Relationships    Social connections     Talks on phone: Not on file     Gets together: Not on file     Attends Buddhism service: Not on file     Active member of club or organization: Not on file     Attends meetings of clubs or organizations: Not on file     Relationship status: Not on file    Intimate partner violence     Fear of current or ex partner: Not on file     Emotionally abused: Not on file     Physically abused: Not on file     Forced sexual activity: Not on file   Other Topics Concern    Not on file   Social History Narrative    Exercise habits      Past Medical History:   Diagnosis Date    Foot fracture, right     Known health problems: none     Metatarsalgia     Self-mutilation     cutting      Past Surgical History:   Procedure Laterality Date    DENTAL SURGERY       Allergies   Allergen Reactions    Ciprofloxacin Rash     Current Outpatient Medications on File Prior to Visit   Medication Sig Dispense Refill    albuterol (PROVENTIL HFA,VENTOLIN HFA) 90 mcg/act inhaler Inhale 2 puffs every 4 (four) hours as needed for wheezing 1 Inhaler 3    loratadine (CLARITIN) 10 mg tablet Take 10 mg by mouth daily      ondansetron (ZOFRAN-ODT) 4 mg disintegrating tablet Take 1 tablet (4 mg total) by mouth every 6 (six) hours as needed for nausea or vomiting 20 tablet 0     No current facility-administered medications on file prior to visit  Objective:    Review of Systems   Constitutional: Negative for chills and fever  HENT: Negative for drooling and sneezing  Eyes: Negative for redness  Respiratory: Negative for cough and wheezing  Cardiovascular: Negative for chest pain  Gastrointestinal: Negative for nausea and vomiting  Musculoskeletal:        As reviewed in HPI   Skin: Negative for rash  Psychiatric/Behavioral: The patient is not nervous/anxious  Right Ankle Exam     Tenderness   Right ankle tenderness location: peroneal tendon and posterior tibialis tendon   Swelling: none    Range of Motion   Dorsiflexion: normal   Plantar flexion: normal   Eversion: normal Right ankle eversion: pain  Inversion: normal Right ankle inversion: pain      Muscle Strength   Dorsiflexion:  5/5  Plantar flexion:  5/5    Other   Erythema: absent  Scars: absent  Sensation: normal  Pulse: present     Comments:  Pronation and calcaneal varus or right foot  Pain against resisted inversion and eversion with giving way due to pain  PTT dysfunction test negative able to do single toe raise with pain               Physical Exam    Procedures  No Procedures performed today    no imaging to review today      Portions of the record may have been created with voice recognition software   Occasional wrong word or "sound a like" substitutions may have occurred due to the inherent limitations of voice recognition software   Read the chart carefully and recognize, using context, where substitutions have occurred

## 2020-10-26 ENCOUNTER — OFFICE VISIT (OUTPATIENT)
Dept: OBGYN CLINIC | Facility: CLINIC | Age: 30
End: 2020-10-26
Payer: COMMERCIAL

## 2020-10-26 DIAGNOSIS — M76.71 PERONEAL TENDONITIS, RIGHT: ICD-10-CM

## 2020-10-26 DIAGNOSIS — M76.821 TIBIALIS POSTERIOR TENDINITIS, RIGHT: ICD-10-CM

## 2020-10-26 DIAGNOSIS — M72.2 PLANTAR FASCIITIS OF RIGHT FOOT: Primary | ICD-10-CM

## 2020-10-26 PROCEDURE — 99213 OFFICE O/P EST LOW 20 MIN: CPT | Performed by: ORTHOPAEDIC SURGERY

## 2020-11-10 ENCOUNTER — OFFICE VISIT (OUTPATIENT)
Dept: FAMILY MEDICINE CLINIC | Facility: CLINIC | Age: 30
End: 2020-11-10
Payer: COMMERCIAL

## 2020-11-10 VITALS
TEMPERATURE: 98.2 F | HEART RATE: 86 BPM | BODY MASS INDEX: 29.73 KG/M2 | HEIGHT: 66 IN | SYSTOLIC BLOOD PRESSURE: 108 MMHG | DIASTOLIC BLOOD PRESSURE: 64 MMHG | WEIGHT: 185 LBS

## 2020-11-10 DIAGNOSIS — K43.9 HERNIA OF ABDOMINAL WALL: Primary | ICD-10-CM

## 2020-11-10 DIAGNOSIS — K42.9 UMBILICAL HERNIA WITHOUT OBSTRUCTION AND WITHOUT GANGRENE: ICD-10-CM

## 2020-11-10 DIAGNOSIS — K82.8 DYSFUNCTIONAL GALLBLADDER: ICD-10-CM

## 2020-11-10 DIAGNOSIS — K44.9 HIATAL HERNIA: ICD-10-CM

## 2020-11-10 PROBLEM — N30.00 ACUTE CYSTITIS WITHOUT HEMATURIA: Status: RESOLVED | Noted: 2020-08-07 | Resolved: 2020-11-10

## 2020-11-10 PROCEDURE — 99213 OFFICE O/P EST LOW 20 MIN: CPT | Performed by: PHYSICIAN ASSISTANT

## 2020-11-12 ENCOUNTER — CONSULT (OUTPATIENT)
Dept: SURGERY | Facility: CLINIC | Age: 30
End: 2020-11-12
Payer: COMMERCIAL

## 2020-11-12 VITALS — HEIGHT: 66 IN | BODY MASS INDEX: 28.12 KG/M2 | WEIGHT: 175 LBS | TEMPERATURE: 99.1 F

## 2020-11-12 DIAGNOSIS — K43.9 HERNIA OF ABDOMINAL WALL: ICD-10-CM

## 2020-11-12 DIAGNOSIS — R19.09 MASS OF RIGHT INGUINAL REGION: Primary | ICD-10-CM

## 2020-11-12 PROCEDURE — 99242 OFF/OP CONSLTJ NEW/EST SF 20: CPT | Performed by: SURGERY

## 2020-11-18 ENCOUNTER — APPOINTMENT (OUTPATIENT)
Dept: RADIOLOGY | Facility: CLINIC | Age: 30
End: 2020-11-18
Payer: COMMERCIAL

## 2020-11-18 ENCOUNTER — OFFICE VISIT (OUTPATIENT)
Dept: URGENT CARE | Facility: CLINIC | Age: 30
End: 2020-11-18
Payer: COMMERCIAL

## 2020-11-18 VITALS
HEIGHT: 66 IN | WEIGHT: 175 LBS | RESPIRATION RATE: 16 BRPM | BODY MASS INDEX: 28.12 KG/M2 | SYSTOLIC BLOOD PRESSURE: 124 MMHG | HEART RATE: 76 BPM | OXYGEN SATURATION: 99 % | TEMPERATURE: 97.5 F | DIASTOLIC BLOOD PRESSURE: 69 MMHG

## 2020-11-18 DIAGNOSIS — S99.912A INJURY OF LEFT ANKLE, INITIAL ENCOUNTER: Primary | ICD-10-CM

## 2020-11-18 DIAGNOSIS — S99.912A INJURY OF LEFT ANKLE, INITIAL ENCOUNTER: ICD-10-CM

## 2020-11-18 PROCEDURE — G0382 LEV 3 HOSP TYPE B ED VISIT: HCPCS | Performed by: NURSE PRACTITIONER

## 2020-11-18 PROCEDURE — 73610 X-RAY EXAM OF ANKLE: CPT

## 2020-11-19 ENCOUNTER — HOSPITAL ENCOUNTER (OUTPATIENT)
Dept: RADIOLOGY | Age: 30
Discharge: HOME | End: 2020-11-19
Payer: COMMERCIAL

## 2020-11-19 DIAGNOSIS — R19.09 MASS OF RIGHT INGUINAL REGION: ICD-10-CM

## 2020-11-19 PROCEDURE — 76705 ECHO EXAM OF ABDOMEN: CPT

## 2020-11-23 DIAGNOSIS — R11.0 NAUSEA: ICD-10-CM

## 2020-11-24 RX ORDER — ONDANSETRON 4 MG/1
4 TABLET, ORALLY DISINTEGRATING ORAL EVERY 6 HOURS PRN
Qty: 20 TABLET | Refills: 0 | Status: SHIPPED | OUTPATIENT
Start: 2020-11-24 | End: 2020-12-18 | Stop reason: SDUPTHER

## 2020-11-25 DIAGNOSIS — R19.09 MASS OF RIGHT INGUINAL REGION: Primary | ICD-10-CM

## 2020-11-29 ENCOUNTER — HOSPITAL ENCOUNTER (EMERGENCY)
Facility: HOSPITAL | Age: 30
Discharge: HOME | End: 2020-11-29
Attending: EMERGENCY MEDICINE | Admitting: EMERGENCY MEDICINE
Payer: COMMERCIAL

## 2020-11-29 ENCOUNTER — APPOINTMENT (EMERGENCY)
Dept: RADIOLOGY | Facility: HOSPITAL | Age: 30
End: 2020-11-29
Payer: COMMERCIAL

## 2020-11-29 VITALS
BODY MASS INDEX: 28.12 KG/M2 | WEIGHT: 175 LBS | RESPIRATION RATE: 18 BRPM | HEIGHT: 66 IN | SYSTOLIC BLOOD PRESSURE: 121 MMHG | OXYGEN SATURATION: 98 % | HEART RATE: 93 BPM | DIASTOLIC BLOOD PRESSURE: 83 MMHG

## 2020-11-29 DIAGNOSIS — S67.01XA: ICD-10-CM

## 2020-11-29 DIAGNOSIS — S60.111A: ICD-10-CM

## 2020-11-29 DIAGNOSIS — S69.91XA INJURY OF RIGHT THUMB: Primary | ICD-10-CM

## 2020-11-29 PROCEDURE — 96372 THER/PROPH/DIAG INJ SC/IM: CPT

## 2020-11-29 PROCEDURE — 99283 EMERGENCY DEPT VISIT LOW MDM: CPT

## 2020-11-29 PROCEDURE — 99284 EMERGENCY DEPT VISIT MOD MDM: CPT | Performed by: EMERGENCY MEDICINE

## 2020-11-29 PROCEDURE — 29125 APPL SHORT ARM SPLINT STATIC: CPT | Performed by: EMERGENCY MEDICINE

## 2020-11-29 PROCEDURE — 73140 X-RAY EXAM OF FINGER(S): CPT

## 2020-11-29 RX ORDER — KETOROLAC TROMETHAMINE 30 MG/ML
30 INJECTION, SOLUTION INTRAMUSCULAR; INTRAVENOUS ONCE
Status: COMPLETED | OUTPATIENT
Start: 2020-11-29 | End: 2020-11-29

## 2020-11-29 RX ADMIN — KETOROLAC TROMETHAMINE 30 MG: 30 INJECTION, SOLUTION INTRAMUSCULAR at 04:54

## 2020-12-02 ENCOUNTER — TRANSCRIBE ORDERS (OUTPATIENT)
Dept: RADIOLOGY | Facility: HOSPITAL | Age: 30
End: 2020-12-02

## 2020-12-02 ENCOUNTER — HOSPITAL ENCOUNTER (OUTPATIENT)
Dept: RADIOLOGY | Facility: HOSPITAL | Age: 30
Discharge: HOME | End: 2020-12-02
Attending: SURGERY
Payer: COMMERCIAL

## 2020-12-02 DIAGNOSIS — R19.09 MASS OF RIGHT INGUINAL REGION: ICD-10-CM

## 2020-12-02 PROCEDURE — 72193 CT PELVIS W/DYE: CPT

## 2020-12-02 PROCEDURE — G1004 CDSM NDSC: HCPCS

## 2020-12-02 RX ADMIN — IOHEXOL 100 ML: 350 INJECTION, SOLUTION INTRAVENOUS at 07:25

## 2020-12-04 ENCOUNTER — OFFICE VISIT (OUTPATIENT)
Dept: SURGERY | Facility: CLINIC | Age: 30
End: 2020-12-04
Payer: COMMERCIAL

## 2020-12-04 DIAGNOSIS — R19.09 MASS OF RIGHT INGUINAL REGION: Primary | ICD-10-CM

## 2020-12-04 PROCEDURE — 99214 OFFICE O/P EST MOD 30 MIN: CPT | Performed by: SURGERY

## 2020-12-07 ENCOUNTER — ANESTHESIA EVENT (OUTPATIENT)
Dept: PERIOP | Facility: HOSPITAL | Age: 30
End: 2020-12-07
Payer: COMMERCIAL

## 2020-12-07 ENCOUNTER — PREP FOR PROCEDURE (OUTPATIENT)
Dept: SURGERY | Facility: CLINIC | Age: 30
End: 2020-12-07

## 2020-12-07 DIAGNOSIS — R22.41 MASS OF LEG, RIGHT: Primary | ICD-10-CM

## 2020-12-08 ENCOUNTER — OFFICE VISIT (OUTPATIENT)
Dept: OBGYN CLINIC | Facility: HOSPITAL | Age: 30
End: 2020-12-08
Payer: COMMERCIAL

## 2020-12-08 VITALS
BODY MASS INDEX: 29.18 KG/M2 | HEART RATE: 58 BPM | SYSTOLIC BLOOD PRESSURE: 120 MMHG | DIASTOLIC BLOOD PRESSURE: 78 MMHG | WEIGHT: 181.6 LBS | HEIGHT: 66 IN

## 2020-12-08 DIAGNOSIS — L60.8 SUBUNGUAL HEMORRHAGE: Primary | ICD-10-CM

## 2020-12-08 PROCEDURE — 99213 OFFICE O/P EST LOW 20 MIN: CPT | Performed by: PHYSICIAN ASSISTANT

## 2020-12-17 ENCOUNTER — ANESTHESIA (OUTPATIENT)
Dept: PERIOP | Facility: HOSPITAL | Age: 30
End: 2020-12-17
Payer: COMMERCIAL

## 2020-12-17 ENCOUNTER — HOSPITAL ENCOUNTER (OUTPATIENT)
Facility: HOSPITAL | Age: 30
Setting detail: OUTPATIENT SURGERY
Discharge: HOME | End: 2020-12-17
Attending: SURGERY | Admitting: SURGERY
Payer: COMMERCIAL

## 2020-12-17 VITALS
HEART RATE: 60 BPM | HEIGHT: 66 IN | DIASTOLIC BLOOD PRESSURE: 78 MMHG | WEIGHT: 181 LBS | TEMPERATURE: 97.6 F | RESPIRATION RATE: 20 BRPM | BODY MASS INDEX: 29.09 KG/M2 | SYSTOLIC BLOOD PRESSURE: 110 MMHG | OXYGEN SATURATION: 96 %

## 2020-12-17 VITALS — HEART RATE: 78 BPM

## 2020-12-17 DIAGNOSIS — R22.41 MASS OF LEG, RIGHT: ICD-10-CM

## 2020-12-17 LAB
EXT PREGNANCY TEST URINE: NEGATIVE
EXT. CONTROL: NORMAL

## 2020-12-17 PROCEDURE — 81025 URINE PREGNANCY TEST: CPT | Performed by: SURGERY

## 2020-12-17 PROCEDURE — 88305 TISSUE EXAM BY PATHOLOGIST: CPT | Performed by: PATHOLOGY

## 2020-12-17 PROCEDURE — C1781 MESH (IMPLANTABLE): HCPCS | Performed by: SURGERY

## 2020-12-17 PROCEDURE — 49505 PRP I/HERN INIT REDUC >5 YR: CPT | Performed by: SURGERY

## 2020-12-17 DEVICE — BARD MESH PERFIX PLUG, LARGE
Type: IMPLANTABLE DEVICE | Site: GROIN | Status: FUNCTIONAL
Brand: BARD MESH PERFIX PLUG

## 2020-12-17 RX ORDER — ONDANSETRON 2 MG/ML
INJECTION INTRAMUSCULAR; INTRAVENOUS AS NEEDED
Status: DISCONTINUED | OUTPATIENT
Start: 2020-12-17 | End: 2020-12-17

## 2020-12-17 RX ORDER — SCOLOPAMINE TRANSDERMAL SYSTEM 1 MG/1
1 PATCH, EXTENDED RELEASE TRANSDERMAL
Status: DISCONTINUED | OUTPATIENT
Start: 2020-12-17 | End: 2020-12-17

## 2020-12-17 RX ORDER — ROCURONIUM BROMIDE 10 MG/ML
INJECTION, SOLUTION INTRAVENOUS AS NEEDED
Status: DISCONTINUED | OUTPATIENT
Start: 2020-12-17 | End: 2020-12-17

## 2020-12-17 RX ORDER — ONDANSETRON 2 MG/ML
4 INJECTION INTRAMUSCULAR; INTRAVENOUS EVERY 4 HOURS PRN
Status: DISCONTINUED | OUTPATIENT
Start: 2020-12-17 | End: 2020-12-17 | Stop reason: HOSPADM

## 2020-12-17 RX ORDER — MAGNESIUM HYDROXIDE 1200 MG/15ML
LIQUID ORAL AS NEEDED
Status: DISCONTINUED | OUTPATIENT
Start: 2020-12-17 | End: 2020-12-17 | Stop reason: HOSPADM

## 2020-12-17 RX ORDER — SODIUM CHLORIDE, SODIUM LACTATE, POTASSIUM CHLORIDE, CALCIUM CHLORIDE 600; 310; 30; 20 MG/100ML; MG/100ML; MG/100ML; MG/100ML
125 INJECTION, SOLUTION INTRAVENOUS CONTINUOUS
Status: DISCONTINUED | OUTPATIENT
Start: 2020-12-17 | End: 2020-12-17 | Stop reason: HOSPADM

## 2020-12-17 RX ORDER — PROPOFOL 10 MG/ML
INJECTION, EMULSION INTRAVENOUS AS NEEDED
Status: DISCONTINUED | OUTPATIENT
Start: 2020-12-17 | End: 2020-12-17

## 2020-12-17 RX ORDER — GLYCOPYRROLATE 0.2 MG/ML
INJECTION INTRAMUSCULAR; INTRAVENOUS AS NEEDED
Status: DISCONTINUED | OUTPATIENT
Start: 2020-12-17 | End: 2020-12-17

## 2020-12-17 RX ORDER — DEXMEDETOMIDINE HYDROCHLORIDE 100 UG/ML
INJECTION, SOLUTION INTRAVENOUS AS NEEDED
Status: DISCONTINUED | OUTPATIENT
Start: 2020-12-17 | End: 2020-12-17

## 2020-12-17 RX ORDER — SODIUM CHLORIDE, SODIUM LACTATE, POTASSIUM CHLORIDE, CALCIUM CHLORIDE 600; 310; 30; 20 MG/100ML; MG/100ML; MG/100ML; MG/100ML
INJECTION, SOLUTION INTRAVENOUS CONTINUOUS PRN
Status: DISCONTINUED | OUTPATIENT
Start: 2020-12-17 | End: 2020-12-17

## 2020-12-17 RX ORDER — HYDROMORPHONE HCL/PF 1 MG/ML
0.5 SYRINGE (ML) INJECTION
Status: DISCONTINUED | OUTPATIENT
Start: 2020-12-17 | End: 2020-12-17 | Stop reason: HOSPADM

## 2020-12-17 RX ORDER — BUPIVACAINE HYDROCHLORIDE AND EPINEPHRINE 2.5; 5 MG/ML; UG/ML
INJECTION, SOLUTION EPIDURAL; INFILTRATION; INTRACAUDAL; PERINEURAL AS NEEDED
Status: DISCONTINUED | OUTPATIENT
Start: 2020-12-17 | End: 2020-12-17 | Stop reason: HOSPADM

## 2020-12-17 RX ORDER — KETOROLAC TROMETHAMINE 30 MG/ML
INJECTION, SOLUTION INTRAMUSCULAR; INTRAVENOUS AS NEEDED
Status: DISCONTINUED | OUTPATIENT
Start: 2020-12-17 | End: 2020-12-17

## 2020-12-17 RX ORDER — OXYCODONE HYDROCHLORIDE 5 MG/1
5 TABLET ORAL EVERY 4 HOURS PRN
Status: DISCONTINUED | OUTPATIENT
Start: 2020-12-17 | End: 2020-12-17 | Stop reason: HOSPADM

## 2020-12-17 RX ORDER — LIDOCAINE HYDROCHLORIDE 10 MG/ML
INJECTION, SOLUTION EPIDURAL; INFILTRATION; INTRACAUDAL; PERINEURAL AS NEEDED
Status: DISCONTINUED | OUTPATIENT
Start: 2020-12-17 | End: 2020-12-17

## 2020-12-17 RX ORDER — ONDANSETRON 2 MG/ML
4 INJECTION INTRAMUSCULAR; INTRAVENOUS ONCE AS NEEDED
Status: DISCONTINUED | OUTPATIENT
Start: 2020-12-17 | End: 2020-12-17 | Stop reason: HOSPADM

## 2020-12-17 RX ORDER — FENTANYL CITRATE/PF 50 MCG/ML
50 SYRINGE (ML) INJECTION
Status: DISCONTINUED | OUTPATIENT
Start: 2020-12-17 | End: 2020-12-17 | Stop reason: HOSPADM

## 2020-12-17 RX ORDER — MIDAZOLAM HYDROCHLORIDE 2 MG/2ML
INJECTION, SOLUTION INTRAMUSCULAR; INTRAVENOUS AS NEEDED
Status: DISCONTINUED | OUTPATIENT
Start: 2020-12-17 | End: 2020-12-17

## 2020-12-17 RX ORDER — OXYCODONE HYDROCHLORIDE 5 MG/1
5 TABLET ORAL EVERY 4 HOURS PRN
Qty: 10 TABLET | Refills: 0 | Status: SHIPPED | OUTPATIENT
Start: 2020-12-17 | End: 2020-12-27

## 2020-12-17 RX ORDER — SCOLOPAMINE TRANSDERMAL SYSTEM 1 MG/1
PATCH, EXTENDED RELEASE TRANSDERMAL
Status: COMPLETED
Start: 2020-12-17 | End: 2020-12-17

## 2020-12-17 RX ORDER — FENTANYL CITRATE 50 UG/ML
INJECTION, SOLUTION INTRAMUSCULAR; INTRAVENOUS AS NEEDED
Status: DISCONTINUED | OUTPATIENT
Start: 2020-12-17 | End: 2020-12-17

## 2020-12-17 RX ORDER — SCOLOPAMINE TRANSDERMAL SYSTEM 1 MG/1
1 PATCH, EXTENDED RELEASE TRANSDERMAL
Status: DISCONTINUED | OUTPATIENT
Start: 2020-12-17 | End: 2020-12-17 | Stop reason: HOSPADM

## 2020-12-17 RX ORDER — MORPHINE SULFATE 10 MG/ML
4 INJECTION, SOLUTION INTRAMUSCULAR; INTRAVENOUS
Status: DISCONTINUED | OUTPATIENT
Start: 2020-12-17 | End: 2020-12-17 | Stop reason: HOSPADM

## 2020-12-17 RX ORDER — CEFAZOLIN SODIUM 2 G/50ML
SOLUTION INTRAVENOUS AS NEEDED
Status: DISCONTINUED | OUTPATIENT
Start: 2020-12-17 | End: 2020-12-17

## 2020-12-17 RX ORDER — PROPOFOL 10 MG/ML
INJECTION, EMULSION INTRAVENOUS CONTINUOUS PRN
Status: DISCONTINUED | OUTPATIENT
Start: 2020-12-17 | End: 2020-12-17

## 2020-12-17 RX ORDER — DEXAMETHASONE SODIUM PHOSPHATE 10 MG/ML
INJECTION, SOLUTION INTRAMUSCULAR; INTRAVENOUS AS NEEDED
Status: DISCONTINUED | OUTPATIENT
Start: 2020-12-17 | End: 2020-12-17

## 2020-12-17 RX ORDER — HYDROMORPHONE HCL 110MG/55ML
PATIENT CONTROLLED ANALGESIA SYRINGE INTRAVENOUS AS NEEDED
Status: DISCONTINUED | OUTPATIENT
Start: 2020-12-17 | End: 2020-12-17

## 2020-12-17 RX ADMIN — LIDOCAINE HYDROCHLORIDE 50 MG: 10 INJECTION, SOLUTION EPIDURAL; INFILTRATION; INTRACAUDAL at 12:22

## 2020-12-17 RX ADMIN — SUGAMMADEX 175 MG: 100 INJECTION, SOLUTION INTRAVENOUS at 13:13

## 2020-12-17 RX ADMIN — DEXMEDETOMIDINE HYDROCHLORIDE 8 MCG: 100 INJECTION, SOLUTION INTRAVENOUS at 12:51

## 2020-12-17 RX ADMIN — SCOPOLAMINE 1 PATCH: 1 PATCH TRANSDERMAL at 12:10

## 2020-12-17 RX ADMIN — ROCURONIUM BROMIDE 20 MG: 10 SOLUTION INTRAVENOUS at 12:35

## 2020-12-17 RX ADMIN — ROCURONIUM BROMIDE 30 MG: 10 SOLUTION INTRAVENOUS at 12:30

## 2020-12-17 RX ADMIN — ONDANSETRON 4 MG: 2 INJECTION INTRAMUSCULAR; INTRAVENOUS at 12:26

## 2020-12-17 RX ADMIN — MIDAZOLAM HYDROCHLORIDE 2 MG: 1 INJECTION, SOLUTION INTRAMUSCULAR; INTRAVENOUS at 12:15

## 2020-12-17 RX ADMIN — SODIUM CHLORIDE, SODIUM LACTATE, POTASSIUM CHLORIDE, AND CALCIUM CHLORIDE: .6; .31; .03; .02 INJECTION, SOLUTION INTRAVENOUS at 12:10

## 2020-12-17 RX ADMIN — KETOROLAC TROMETHAMINE 30 MG: 30 INJECTION, SOLUTION INTRAMUSCULAR at 13:07

## 2020-12-17 RX ADMIN — DEXMEDETOMIDINE HYDROCHLORIDE 8 MCG: 100 INJECTION, SOLUTION INTRAVENOUS at 12:48

## 2020-12-17 RX ADMIN — PROPOFOL 200 MG: 10 INJECTION, EMULSION INTRAVENOUS at 12:22

## 2020-12-17 RX ADMIN — CEFAZOLIN SODIUM 2000 MG: 2 SOLUTION INTRAVENOUS at 12:10

## 2020-12-17 RX ADMIN — HYDROMORPHONE HYDROCHLORIDE 0.5 MG: 2 INJECTION, SOLUTION INTRAMUSCULAR; INTRAVENOUS; SUBCUTANEOUS at 12:30

## 2020-12-17 RX ADMIN — FENTANYL CITRATE 50 MCG: 50 INJECTION, SOLUTION INTRAMUSCULAR; INTRAVENOUS at 13:34

## 2020-12-17 RX ADMIN — PROPOFOL 150 MCG/KG/MIN: 10 INJECTION, EMULSION INTRAVENOUS at 12:26

## 2020-12-17 RX ADMIN — FENTANYL CITRATE 50 MCG: 50 INJECTION, SOLUTION INTRAMUSCULAR; INTRAVENOUS at 12:22

## 2020-12-17 RX ADMIN — OXYCODONE HYDROCHLORIDE 5 MG: 5 TABLET ORAL at 15:34

## 2020-12-17 RX ADMIN — DEXAMETHASONE SODIUM PHOSPHATE 4 MG: 10 INJECTION, SOLUTION INTRAMUSCULAR; INTRAVENOUS at 12:26

## 2020-12-17 RX ADMIN — FENTANYL CITRATE 50 MCG: 50 INJECTION, SOLUTION INTRAMUSCULAR; INTRAVENOUS at 12:26

## 2020-12-17 RX ADMIN — HYDROMORPHONE HYDROCHLORIDE 0.5 MG: 2 INJECTION, SOLUTION INTRAMUSCULAR; INTRAVENOUS; SUBCUTANEOUS at 13:12

## 2020-12-17 RX ADMIN — PHENYLEPHRINE HYDROCHLORIDE 100 MCG: 10 INJECTION INTRAVENOUS at 12:28

## 2020-12-17 RX ADMIN — GLYCOPYRROLATE 0.2 MG: 0.2 INJECTION, SOLUTION INTRAMUSCULAR; INTRAVENOUS at 12:45

## 2020-12-17 RX ADMIN — SODIUM CHLORIDE, SODIUM LACTATE, POTASSIUM CHLORIDE, AND CALCIUM CHLORIDE: .6; .31; .03; .02 INJECTION, SOLUTION INTRAVENOUS at 12:43

## 2020-12-17 RX ADMIN — DEXMEDETOMIDINE HYDROCHLORIDE 8 MCG: 100 INJECTION, SOLUTION INTRAVENOUS at 12:45

## 2020-12-18 DIAGNOSIS — R11.0 NAUSEA: ICD-10-CM

## 2020-12-18 RX ORDER — ONDANSETRON 4 MG/1
4 TABLET, ORALLY DISINTEGRATING ORAL EVERY 6 HOURS PRN
Qty: 20 TABLET | Refills: 0 | Status: SHIPPED | OUTPATIENT
Start: 2020-12-18 | End: 2021-05-18 | Stop reason: SDUPTHER

## 2020-12-23 ENCOUNTER — IMMUNIZATIONS (OUTPATIENT)
Dept: FAMILY MEDICINE CLINIC | Facility: HOSPITAL | Age: 30
End: 2020-12-23
Payer: COMMERCIAL

## 2020-12-23 DIAGNOSIS — Z23 ENCOUNTER FOR IMMUNIZATION: ICD-10-CM

## 2020-12-23 PROCEDURE — 0001A SARS-COV-2 / COVID-19 MRNA VACCINE (PFIZER-BIONTECH) 30 MCG: CPT

## 2020-12-23 PROCEDURE — 91300 SARS-COV-2 / COVID-19 MRNA VACCINE (PFIZER-BIONTECH) 30 MCG: CPT

## 2021-01-06 ENCOUNTER — OFFICE VISIT (OUTPATIENT)
Dept: SURGERY | Facility: CLINIC | Age: 31
End: 2021-01-06

## 2021-01-06 DIAGNOSIS — K43.9 HERNIA OF ABDOMINAL WALL: ICD-10-CM

## 2021-01-06 DIAGNOSIS — R19.09 MASS OF RIGHT INGUINAL REGION: Primary | ICD-10-CM

## 2021-01-06 PROCEDURE — 99024 POSTOP FOLLOW-UP VISIT: CPT | Performed by: SURGERY

## 2021-01-06 NOTE — PROGRESS NOTES
Assessment/Plan:    Diagnoses and all orders for this visit:    Mass of right inguinal region    Hernia of abdominal wall    Doing well  Healing ridge as expected and incision  May resume typical activities as of 01/14/2021  Call as needed    Pathology: Reviewed with patient, all questions answered  Postoperative restrictions reviewed  All questions answered  ______________________________________________________  HPI: Patient presents post operatively  Excision biopsy tissue lesion/mass lower extremity (right groin) and right inguinal hernia repair 12/17/2020  Final Diagnosis  A  Mass, right groin cystic mass, excision:  - 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  - An associated mesothelial lined cyst, may represent hernia sac  ROS:  General ROS: negative for - chills, fatigue, fever or night sweats, weight loss  Respiratory ROS: no cough, shortness of breath, or wheezing  Cardiovascular ROS: no chest pain or dyspnea on exertion  Genito-Urinary ROS: no dysuria, trouble voiding, or hematuria  Musculoskeletal ROS: negative for - gait disturbance, joint pain or muscle pain  Neurological ROS: no TIA or stroke symptoms  GI ROS: see HPI  Skin ROS: no new rashes or lesions   Lymphatic ROS: no new adenopathy noted by pt  GYN ROS: see HPI, no new GYN history or bleeding noted  Psy ROS: no new mental or behavioral disturbances         Patient Active Problem List   Diagnosis    Cuboid syndrome of right foot    Pain in right foot    Peroneal tendonitis, right    Postural hypotension    Overweight (BMI 25 0-29  9)    Nephrolithiasis    Other headache syndrome    Tinnitus of left ear    Dizziness    Changes in vision    Suspected COVID-19 virus infection    Hiatal hernia    Umbilical hernia    Hernia of abdominal wall    Dysfunctional gallbladder       Allergies:  Ciprofloxacin      Current Outpatient Medications:     loratadine (CLARITIN) 10 mg tablet, Take 10 mg by mouth daily, Disp: , Rfl:     ondansetron (ZOFRAN-ODT) 4 mg disintegrating tablet, Take 1 tablet (4 mg total) by mouth every 6 (six) hours as needed for nausea or vomiting, Disp: 20 tablet, Rfl: 0    Past Medical History:   Diagnosis Date    Foot fracture, right     Known health problems: none     Metatarsalgia     Self-mutilation     cutting        Past Surgical History:   Procedure Laterality Date    DENTAL SURGERY      EGD      HERNIA REPAIR Right 12/17/2020    Procedure: REPAIR HERNIA INGUINAL;  Surgeon: Sarah Calle DO;  Location: [facility];  Service: General    ID EXC TUMOR SOFT TISSUE LEG/ANKLE SUBQ 3+CM Right 12/17/2020    Procedure: EXCISION BIOPSY TISSUE LESION/MASS LOWER EXTREMITY (right inguinal region); Surgeon: Sarah Calle DO;  Location: [facility];  Service: General       Family History   Problem Relation Age of Onset    Bipolar disorder Brother     Gallbladder disease Family     Cancer Mother     No Known Problems Father         reports that she has never smoked  She has never used smokeless tobacco  She reports current alcohol use  She reports that she does not use drugs  PHYSICAL EXAM    There were no vitals taken for this visit  General: normal, cooperative, no distress  Abdominal: soft, nondistended or nontender  Incision: clean, dry, and intact and healing well    Healing ridge as expected      Sarah Calle DO    Date: 1/6/2021 Time: 9:36 AM

## 2021-01-11 ENCOUNTER — IMMUNIZATIONS (OUTPATIENT)
Dept: FAMILY MEDICINE CLINIC | Facility: HOSPITAL | Age: 31
End: 2021-01-11

## 2021-01-11 DIAGNOSIS — Z23 ENCOUNTER FOR IMMUNIZATION: ICD-10-CM

## 2021-01-11 PROCEDURE — 91300 SARS-COV-2 / COVID-19 MRNA VACCINE (PFIZER-BIONTECH) 30 MCG: CPT

## 2021-01-11 PROCEDURE — 0002A SARS-COV-2 / COVID-19 MRNA VACCINE (PFIZER-BIONTECH) 30 MCG: CPT

## 2021-02-17 ENCOUNTER — HOSPITAL ENCOUNTER (EMERGENCY)
Facility: HOSPITAL | Age: 31
Discharge: HOME | End: 2021-02-17
Attending: EMERGENCY MEDICINE | Admitting: EMERGENCY MEDICINE
Payer: COMMERCIAL

## 2021-02-17 VITALS
RESPIRATION RATE: 16 BRPM | OXYGEN SATURATION: 99 % | TEMPERATURE: 98.4 F | HEART RATE: 79 BPM | DIASTOLIC BLOOD PRESSURE: 79 MMHG | SYSTOLIC BLOOD PRESSURE: 123 MMHG

## 2021-02-17 DIAGNOSIS — S41.052A: Primary | ICD-10-CM
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
## 2021-02-17 PROCEDURE — 12002 RPR S/N/AX/GEN/TRNK2.6-7.5CM: CPT | Performed by: EMERGENCY MEDICINE

## 2021-02-17 PROCEDURE — 99283 EMERGENCY DEPT VISIT LOW MDM: CPT

## 2021-02-17 PROCEDURE — 99284 EMERGENCY DEPT VISIT MOD MDM: CPT | Performed by: EMERGENCY MEDICINE

## 2021-02-17 RX ORDER — IBUPROFEN 400 MG/1
800 TABLET ORAL ONCE
Status: COMPLETED | OUTPATIENT
Start: 2021-02-17 | End: 2021-02-17

## 2021-02-17 RX ORDER — AMOXICILLIN AND CLAVULANATE POTASSIUM 875; 125 MG/1; MG/1
1 TABLET, FILM COATED ORAL ONCE
Status: COMPLETED | OUTPATIENT
Start: 2021-02-17 | End: 2021-02-17

## 2021-02-17 RX ORDER — GINSENG 100 MG
1 CAPSULE ORAL ONCE
Status: COMPLETED | OUTPATIENT
Start: 2021-02-17 | End: 2021-02-17

## 2021-02-17 RX ORDER — LIDOCAINE HYDROCHLORIDE AND EPINEPHRINE 10; 10 MG/ML; UG/ML
20 INJECTION, SOLUTION INFILTRATION; PERINEURAL ONCE
Status: COMPLETED | OUTPATIENT
Start: 2021-02-17 | End: 2021-02-17

## 2021-02-17 RX ORDER — AMOXICILLIN AND CLAVULANATE POTASSIUM 875; 125 MG/1; MG/1
1 TABLET, FILM COATED ORAL EVERY 12 HOURS
Qty: 14 TABLET | Refills: 0 | Status: SHIPPED | OUTPATIENT
Start: 2021-02-17 | End: 2021-02-24

## 2021-02-17 RX ADMIN — AMOXICILLIN AND CLAVULANATE POTASSIUM 1 TABLET: 875; 125 TABLET, FILM COATED ORAL at 11:15

## 2021-02-17 RX ADMIN — LIDOCAINE HYDROCHLORIDE,EPINEPHRINE BITARTRATE 20 ML: 10; .01 INJECTION, SOLUTION INFILTRATION; PERINEURAL at 11:16

## 2021-02-17 RX ADMIN — BACITRACIN ZINC 1 SMALL APPLICATION: 500 OINTMENT TOPICAL at 11:16

## 2021-02-17 RX ADMIN — IBUPROFEN 800 MG: 400 TABLET ORAL at 11:15

## 2021-02-17 NOTE — ED PROVIDER NOTES
History  No chief complaint on file  19-year-old female presents the emergency department for evaluation after being bitten by her dog  Patient states that they adopted a [employer] on Saturday  This morning while she was sitting on the couch he became aggressive and bit her left shoulder  She presents with puncture wounds along the left shoulder and back  She was also scratched along the left side of her face and right forearm  Patient's tetanus immunization is up-to-date  The dog's vaccinations are also up-to-date  History provided by:  Patient and medical records   used: No    Dog Bite  Contact animal:  Dog  Location:  Shoulder/arm  Shoulder/arm injury location:  L shoulder  Pain details:     Quality:  Sore    Severity:  Moderate    Timing:  Constant    Progression:  Unchanged  Incident location:  Home  Provoked: unprovoked    Notification: Patient contacted the shelter where she adopted a dog and they plan on to take the dog back  Animal's rabies vaccination status:  Up to date  Animal in possession: yes    Tetanus status:  Up to date  Relieved by:  Nothing  Worsened by:  Nothing  Ineffective treatments:  None tried  Associated symptoms: swelling    Associated symptoms: no fever and no numbness        Prior to Admission Medications   Prescriptions Last Dose Informant Patient Reported?  Taking?   loratadine (CLARITIN) 10 mg tablet  Self Yes No   Sig: Take 10 mg by mouth daily   ondansetron (ZOFRAN-ODT) 4 mg disintegrating tablet   No No   Sig: Take 1 tablet (4 mg total) by mouth every 6 (six) hours as needed for nausea or vomiting      Facility-Administered Medications: None       Past Medical History:   Diagnosis Date    Foot fracture, right     Known health problems: none     Metatarsalgia     Self-mutilation     cutting        Past Surgical History:   Procedure Laterality Date    DENTAL SURGERY      EGD      HERNIA REPAIR Right 12/17/2020    Procedure: REPAIR HERNIA INGUINAL;  Surgeon: Bjorn Hansen DO;  [facility];  Service: General    LA EXC TUMOR SOFT TISSUE LEG/ANKLE SUBQ 3+CM Right 12/17/2020    Procedure: EXCISION BIOPSY TISSUE LESION/MASS LOWER EXTREMITY (right inguinal region); Surgeon: Bjorn Hansen DO;  [facility];  Service: General       Family History   Problem Relation Age of Onset    Bipolar disorder Brother     Gallbladder disease Family     Cancer Mother     No Known Problems Father      I have reviewed and agree with the history as documented  E-Cigarette/Vaping    E-Cigarette Use Never User      E-Cigarette/Vaping Substances     Social History     Tobacco Use    Smoking status: Never Smoker    Smokeless tobacco: Never Used   Substance Use Topics    Alcohol use: Yes     Frequency: 2-4 times a month     Drinks per session: 1 or 2     Binge frequency: Less than monthly     Comment: occasional - social     Drug use: No       Review of Systems   Constitutional: Negative for chills and fever  Skin: Positive for wound  Neurological: Negative for weakness and numbness  All other systems reviewed and are negative  Physical Exam  Physical Exam  Vitals signs and nursing note reviewed  Constitutional:       General: She is not in acute distress  Appearance: She is well-developed  HENT:      Head: Normocephalic  Abrasion present  Jaw: There is normal jaw occlusion  Right Ear: External ear normal       Left Ear: External ear normal       Nose: Nose normal       Mouth/Throat:      Lips: Pink  Eyes:      General: Lids are normal  No scleral icterus  Extraocular Movements: Extraocular movements intact  Pupils: Pupils are equal, round, and reactive to light  Neck:      Musculoskeletal: Normal range of motion and neck supple  Pulmonary:      Effort: Pulmonary effort is normal  No respiratory distress  Musculoskeletal: Normal range of motion  General: No deformity  Arms:    Skin:     General: Skin is warm and dry  Neurological:      Mental Status: She is alert and oriented to person, place, and time  Vital Signs  ED Triage Vitals [02/17/21 1030]   Temperature Pulse Respirations Blood Pressure SpO2   98 4 °F (36 9 °C) 79 16 123/79 99 %      Temp Source Heart Rate Source Patient Position - Orthostatic VS BP Location FiO2 (%)   Oral Monitor Sitting Right arm --      Pain Score       3           Vitals:    02/17/21 1030   BP: 123/79   Pulse: 79   Patient Position - Orthostatic VS: Sitting         Visual Acuity      ED Medications  Medications   lidocaine-epinephrine (XYLOCAINE/EPINEPHRINE) 1 %-1:100,000 injection 20 mL (20 mL Infiltration Given 2/17/21 1116)   amoxicillin-clavulanate (AUGMENTIN) 875-125 mg per tablet 1 tablet (1 tablet Oral Given 2/17/21 1115)   ibuprofen (MOTRIN) tablet 800 mg (800 mg Oral Given 2/17/21 1115)   bacitracin topical ointment 1 small application (1 small application Topical Given 2/17/21 1116)       Diagnostic Studies  Results Reviewed     None                 No orders to display              Procedures  Procedures         ED Course                                           MDM  Number of Diagnoses or Management Options  Open wound of left shoulder due to dog bite: new and requires workup     Amount and/or Complexity of Data Reviewed  Decide to obtain previous medical records or to obtain history from someone other than the patient: yes  Independent visualization of images, tracings, or specimens: yes    Risk of Complications, Morbidity, and/or Mortality  General comments: 67-year-old female presents with 3 small puncture wound lacerations after being bit by her new dog, unprovoked  The dog's immunizations are up-to-date the patient's tetanus shot is current  Her wounds were irrigated and closed with sutures  Patient will be started on oral antibiotics  Discussed signs and symptoms return to the emergency department    Patient to return for suture removal in 7 to 10 days  Patient Progress  Patient progress: stable      Disposition  Final diagnoses:   Open wound of left shoulder due to dog bite - 3 puncture wounds     Time reflects when diagnosis was documented in both MDM as applicable and the Disposition within this note     Time User Action Codes Description Comment    2/17/2021 11:51 AM White, Margaret Add [Q91 052F,  W54  0XXA] Open wound of left shoulder due to dog bite     2/17/2021 11:51 AM White, Margaret Remove [id number],  W54  0XXA] Open wound of left shoulder due to dog bite     2/17/2021 11:51 AM White, Margaret Add [S41 052A,  W54  0XXA] Open wound of left shoulder due to dog bite     2/17/2021 11:51 AM White, Margaret Modify [id number],  W54  0XXA] Open wound of left shoulder due to dog bite 2 puncture wounds    2/17/2021 11:54 AM White, Margaret Add [S31 000A,  W54  0XXA] Open wound of lower back due to dog bite     2/17/2021 11:54 AM White, Margaret Remove [S31 000A,  W54  0XXA] Open wound of lower back due to dog bite     2/17/2021 11:54 AM White, Margaret Modify [id number],  W54  0XXA] Open wound of left shoulder due to dog bite 3 puncture wounds      ED Disposition     ED Disposition Condition Date/Time Comment    Discharge Stable Wed Feb 17, 2021 11:51 AM Ulisses Dyer discharge to home/self care              Follow-up Information     Follow up With Specialties Details Why [address] [place] [address] [place], PA-C Family Medicine, Physician Assistant Schedule an appointment as soon as possible for a visit  As needed, For recheck of current symptoms [address] [place] [address] [address]  [phone]            Discharge Medication List as of 2/17/2021 11:58 AM      START taking these medications    Details   amoxicillin-clavulanate (AUGMENTIN) 875-125 mg per tablet Take 1 tablet by mouth every 12 (twelve) hours for 7 days, Starting Wed 2/17/2021, Until Wed 2/24/2021, Normal         CONTINUE these medications which have NOT CHANGED    Details   loratadine (CLARITIN) 10 mg tablet Take 10 mg by mouth daily, Historical Med      ondansetron (ZOFRAN-ODT) 4 mg disintegrating tablet Take 1 tablet (4 mg total) by mouth every 6 (six) hours as needed for nausea or vomiting, Starting Fri 12/18/2020, Normal           No discharge procedures on file      PDMP Review     None          ED Provider  Electronically Signed by           Jayesh Stevenson DO  02/17/21 7740

## 2021-02-17 NOTE — ED PROCEDURE NOTE
Procedure  Laceration repair    Date/Time: 2/17/2021 11:26 AM  Performed by: Keith Melendrez PA-C  Authorized by: Keith Melendrez PA-C   Consent: Verbal consent obtained  Consent given by: patient  Patient understanding: patient states understanding of the procedure being performed  Patient identity confirmed: arm band  Body area: upper extremity  Location details: left shoulder  Laceration length: 1 cm  Foreign bodies: no foreign bodies  Tendon involvement: none  Nerve involvement: none  Vascular damage: no  Anesthesia: local infiltration    Anesthesia:  Local Anesthetic: lidocaine 1% with epinephrine  Anesthetic total: 3 mL    Wound Dehiscence:  Superficial Wound Dehiscence: simple closure      Procedure Details:  Irrigation solution: saline  Irrigation method: jet lavage  Amount of cleaning: standard  Debridement: none  Degree of undermining: none  Skin closure: 4-0 nylon  Number of sutures: 1  Technique: simple  Approximation: close  Approximation difficulty: simple  Dressing: bandaid    Patient tolerance: patient tolerated the procedure well with no immediate complications    Laceration repair    Date/Time: 2/17/2021 11:28 AM  Performed by: Keith Melendrez PA-C  Authorized by: Keith Melendrez PA-C   Patient understanding: patient states understanding of the procedure being performed  Patient identity confirmed: verbally with patient  Body area: upper extremity  Location details: left shoulder  Laceration length: 1 cm  Foreign bodies: no foreign bodies  Tendon involvement: none  Nerve involvement: none  Anesthesia: local infiltration    Anesthesia:  Local Anesthetic: lidocaine 1% with epinephrine  Anesthetic total: 3 mL    Wound Dehiscence:  Superficial Wound Dehiscence: simple closure      Procedure Details:  Irrigation solution: saline  Irrigation method: jet lavage  Amount of cleaning: standard  Debridement: none  Degree of undermining: none  Skin closure: 4-0 nylon  Number of sutures: 1  Technique: simple  Approximation: close  Approximation difficulty: simple  Dressing: band aid  Patient tolerance: patient tolerated the procedure well with no immediate complications    Laceration repair    Date/Time: 2/17/2021 11:28 AM  Performed by: Silvia Caicedo PA-C  Authorized by: Silvia Caicedo PA-C   Consent: Verbal consent obtained    Consent given by: patient  Patient identity confirmed: verbally with patient  Body area: upper extremity  Location details: left shoulder  Laceration length: 1 cm  Foreign bodies: no foreign bodies  Tendon involvement: none  Nerve involvement: none  Vascular damage: no  Anesthesia: local infiltration    Anesthesia:  Local Anesthetic: lidocaine 1% with epinephrine  Anesthetic total: 3 mL    Wound Dehiscence:  Superficial Wound Dehiscence: simple closure      Procedure Details:  Irrigation solution: saline  Irrigation method: jet lavage  Amount of cleaning: standard  Debridement: none  Degree of undermining: none  Skin closure: 4-0 nylon  Number of sutures: 1  Technique: simple  Approximation: close  Approximation difficulty: simple  Dressing: band aid   Patient tolerance: patient tolerated the procedure well with no immediate complications                       Silvia Caicedo PA-C  02/17/21 1548

## 2021-05-18 DIAGNOSIS — R11.0 NAUSEA: ICD-10-CM

## 2021-05-18 RX ORDER — ONDANSETRON 4 MG/1
4 TABLET, ORALLY DISINTEGRATING ORAL EVERY 6 HOURS PRN
Qty: 20 TABLET | Refills: 0 | Status: SHIPPED | OUTPATIENT
Start: 2021-05-18 | End: 2021-07-13 | Stop reason: SDUPTHER

## 2021-07-13 DIAGNOSIS — R11.0 NAUSEA: ICD-10-CM

## 2021-07-13 RX ORDER — ONDANSETRON 4 MG/1
4 TABLET, ORALLY DISINTEGRATING ORAL EVERY 6 HOURS PRN
Qty: 20 TABLET | Refills: 0 | Status: SHIPPED | OUTPATIENT
Start: 2021-07-13 | End: 2021-08-24 | Stop reason: SDUPTHER

## 2021-07-14 ENCOUNTER — OFFICE VISIT (OUTPATIENT)
Dept: URGENT CARE | Facility: CLINIC | Age: 31
End: 2021-07-14
Payer: COMMERCIAL

## 2021-07-14 VITALS — HEART RATE: 84 BPM | OXYGEN SATURATION: 99 % | TEMPERATURE: 98.9 F

## 2021-07-14 DIAGNOSIS — J02.0 STREP PHARYNGITIS: Primary | ICD-10-CM

## 2021-07-14 LAB — S PYO AG THROAT QL: POSITIVE

## 2021-07-14 PROCEDURE — G0382 LEV 3 HOSP TYPE B ED VISIT: HCPCS | Performed by: NURSE PRACTITIONER

## 2021-07-14 PROCEDURE — 87880 STREP A ASSAY W/OPTIC: CPT | Performed by: NURSE PRACTITIONER

## 2021-07-14 RX ORDER — AMOXICILLIN 500 MG/1
500 CAPSULE ORAL EVERY 12 HOURS SCHEDULED
Qty: 20 CAPSULE | Refills: 0 | Status: SHIPPED | OUTPATIENT
Start: 2021-07-14 | End: 2021-07-24

## 2021-07-14 NOTE — PATIENT INSTRUCTIONS
Amoxicillin  twice a day for 10 days   Tylenol/Motrin as needed for pain/fever  Throat lozenge, honey, salt water gargles for pain relief  Increase fluid intake   Discard toothbrush 24 hours after starting antibiotic and again after finishing antibiotics  Follow up with your PCP for worsening symptoms    Strep Throat   WHAT YOU NEED TO KNOW:   Strep throat is a throat infection caused by bacteria  It is easily spread from person to person  DISCHARGE INSTRUCTIONS:   Call 911 for any of the following:   · You have trouble breathing  Return to the emergency department if:   · You have new symptoms like a bad headache, stiff neck, chest pain, or vomiting  · You are drooling because you cannot swallow your spit  Contact your healthcare provider if:   · You have a fever  · You have a rash or ear pain  · You have green, yellow-brown, or bloody mucus when you cough or blow your nose  · You are unable to drink anything  · You have questions or concerns about your condition or care  Medicines:   · Antibiotics  help treat your strep throat  You should feel better within 2 to 3 days after you start antibiotics  · Take your medicine as directed  Contact your healthcare provider if you think your medicine is not helping or if you have side effects  Tell him or her if you are allergic to any medicine  Keep a list of the medicines, vitamins, and herbs you take  Include the amounts, and when and why you take them  Bring the list or the pill bottles to follow-up visits  Carry your medicine list with you in case of an emergency  Manage your symptoms:   · Use lozenges, ice, soft foods, or popsicles  to soothe your throat  · Drink juice, milk shakes, or soup  if your throat is too sore to eat solid food  Drinking liquids can also help prevent dehydration  · Gargle with salt water  Mix ¼ teaspoon salt in a glass of warm water and gargle  This may help reduce swelling in your throat      · Do not smoke   Nicotine and other chemicals in cigarettes and cigars can cause lung damage and make your symptoms worse  Ask your healthcare provider for information if you currently smoke and need help to quit  E-cigarettes or smokeless tobacco still contain nicotine  Talk to your healthcare provider before you use these products  Return to work or school  24 hours after you start antibiotic medicine  Prevent the spread of strep throat:   · Wash your hands often  Use soap and water  Wash your hands after you use the bathroom, change a child's diapers, or sneeze  Wash your hands before you prepare or eat food  · Do not share food or drinks  Replace your toothbrush after you have taken antibiotics for 24 hours  Follow up with your healthcare provider as directed:  Write down your questions so you remember to ask them during your visits  © Copyright [address] Information is for End User's use only and may not be sold, redistributed or otherwise used for commercial purposes  All illustrations and images included in CareNotes® are the copyrighted property of A D A M , Inc  or [address] [place], [place] [address]adonis elvin   The above information is an  only  It is not intended as medical advice for individual conditions or treatments  Talk to your doctor, nurse or pharmacist before following any medical regimen to see if it is safe and effective for you

## 2021-07-14 NOTE — LETTER
July 14, 2021     Patient: Meghana Padron   YOB: 1990   Date of Visit: 7/14/2021       To Whom it May Concern:    Meghana Padron was seen in my clinic on 7/14/2021  She may return to work on 7/15/2021  If you have any questions or concerns, please don't hesitate to call           Sincerely,          BE JUANITA REYES        CC: [facility]

## 2021-07-14 NOTE — PROGRESS NOTES
[facility]        NAME: Radha Kramer is a 31 y o  female  : 1990    MRN: [id number]  DATE: 2021  TIME: 10:10 AM    Assessment and Plan   Strep pharyngitis [J02 0]  1  Strep pharyngitis  amoxicillin (AMOXIL) 500 mg capsule    POCT rapid strepA         Patient Instructions     Amoxicillin  twice a day for 10 days   Tylenol/Motrin as needed for pain/fever  Throat lozenge, honey, salt water gargles for pain relief  Increase fluid intake   Discard toothbrush 24 hours after starting antibiotic and again after finishing antibiotics  Follow up with your PCP for worsening symptoms      Follow up with PCP in 3-5 days  Proceed to  ER if symptoms worsen  Chief Complaint     Chief Complaint   Patient presents with    Sore Throat     Started monday took tylenol     Headache         History of Present Illness       Patient is a 31year old female presenting with sore throat for the past 2 days  She was on vacation with someone who tested positive for Strep C  Denies fever or chills  Denies rhinorrhea or congestion  She was vaccinated for Covid  Denies known exposure  Review of Systems   Review of Systems   Constitutional: Negative for activity change, chills and fever  HENT: Positive for sore throat  Respiratory: Negative for cough  Neurological: Negative for headaches           Current Medications       Current Outpatient Medications:     ondansetron (ZOFRAN-ODT) 4 mg disintegrating tablet, Take 1 tablet (4 mg total) by mouth every 6 (six) hours as needed for nausea or vomiting, Disp: 20 tablet, Rfl: 0    amoxicillin (AMOXIL) 500 mg capsule, Take 1 capsule (500 mg total) by mouth every 12 (twelve) hours for 10 days, Disp: 20 capsule, Rfl: 0    loratadine (CLARITIN) 10 mg tablet, Take 10 mg by mouth daily, Disp: , Rfl:     Current Allergies     Allergies as of 2021 - Reviewed 2021   Allergen Reaction Noted    Ciprofloxacin Rash 2019            The following portions of the patient's history were reviewed and updated as appropriate: allergies, current medications, past family history, past medical history, past social history, past surgical history and problem list      Past Medical History:   Diagnosis Date    Foot fracture, right     Known health problems: none     Metatarsalgia     Self-mutilation     cutting        Past Surgical History:   Procedure Laterality Date    DENTAL SURGERY      EGD      HERNIA REPAIR Right 12/17/2020    Procedure: REPAIR HERNIA INGUINAL;  Surgeon: Anderson Person DO;  [facility];  Service: General    [place] EXC TUMOR SOFT TISSUE LEG/ANKLE SUBQ 3+CM Right 12/17/2020    Procedure: EXCISION BIOPSY TISSUE LESION/MASS LOWER EXTREMITY (right inguinal region); Surgeon: Anderson Person DO;  [facility];  Service: General       Family History   Problem Relation Age of Onset    Bipolar disorder Brother     Gallbladder disease Family     Cancer Mother     No Known Problems Father          Medications have been verified  Objective   Pulse 84   Temp 98 9 °F (37 2 °C) (Temporal)   LMP 06/29/2021   SpO2 99%      Rapid strep: positive  Physical Exam     Physical Exam  Vitals reviewed  Constitutional:       General: She is awake  She is not in acute distress  Appearance: She is well-developed and normal weight  HENT:      Head: Normocephalic  Right Ear: Hearing, tympanic membrane, ear canal and external ear normal       Left Ear: Hearing, tympanic membrane, ear canal and external ear normal       Nose: Nose normal       Mouth/Throat:      Pharynx: Pharyngeal swelling and posterior oropharyngeal erythema present  Tonsils: 2+ on the right  1+ on the left  Cardiovascular:      Rate and Rhythm: Normal rate  Pulmonary:      Effort: Pulmonary effort is normal    Skin:     General: Skin is warm and moist    Neurological:      General: No focal deficit present        Mental Status: She is alert, oriented to person, place, and time and easily aroused  Psychiatric:         Behavior: Behavior is cooperative

## 2021-08-24 DIAGNOSIS — R11.0 NAUSEA: ICD-10-CM

## 2021-08-25 RX ORDER — ONDANSETRON 4 MG/1
4 TABLET, ORALLY DISINTEGRATING ORAL EVERY 6 HOURS PRN
Qty: 20 TABLET | Refills: 0 | Status: SHIPPED | OUTPATIENT
Start: 2021-08-25 | End: 2021-10-11 | Stop reason: SDUPTHER

## 2021-09-15 ENCOUNTER — APPOINTMENT (OUTPATIENT)
Dept: LAB | Facility: HOSPITAL | Age: 31
End: 2021-09-15

## 2021-09-15 DIAGNOSIS — Z00.8 ENCOUNTER FOR OTHER GENERAL EXAMINATION: ICD-10-CM

## 2021-09-15 LAB
CHOLEST SERPL-MCNC: 181 MG/DL (ref 50–200)
EST. AVERAGE GLUCOSE BLD GHB EST-MCNC: 103 MG/DL
HBA1C MFR BLD: 5.2 %
HDLC SERPL-MCNC: 71 MG/DL
LDLC SERPL CALC-MCNC: 101 MG/DL (ref 0–100)
NONHDLC SERPL-MCNC: 110 MG/DL
TRIGL SERPL-MCNC: 47 MG/DL

## 2021-09-15 PROCEDURE — 80061 LIPID PANEL: CPT

## 2021-09-15 PROCEDURE — 36415 COLL VENOUS BLD VENIPUNCTURE: CPT

## 2021-09-15 PROCEDURE — 83036 HEMOGLOBIN GLYCOSYLATED A1C: CPT

## 2021-09-26 ENCOUNTER — OFFICE VISIT (OUTPATIENT)
Dept: URGENT CARE | Age: 31
End: 2021-09-26
Payer: COMMERCIAL

## 2021-09-26 ENCOUNTER — APPOINTMENT (OUTPATIENT)
Dept: RADIOLOGY | Age: 31
End: 2021-09-26
Payer: COMMERCIAL

## 2021-09-26 VITALS
DIASTOLIC BLOOD PRESSURE: 73 MMHG | HEART RATE: 64 BPM | RESPIRATION RATE: 16 BRPM | OXYGEN SATURATION: 99 % | TEMPERATURE: 98.7 F | SYSTOLIC BLOOD PRESSURE: 109 MMHG

## 2021-09-26 DIAGNOSIS — T14.90XA INJURY: Primary | ICD-10-CM

## 2021-09-26 DIAGNOSIS — T14.90XA INJURY: ICD-10-CM

## 2021-09-26 PROCEDURE — G0382 LEV 3 HOSP TYPE B ED VISIT: HCPCS | Performed by: FAMILY MEDICINE

## 2021-09-26 PROCEDURE — 73630 X-RAY EXAM OF FOOT: CPT

## 2021-09-26 PROCEDURE — 73610 X-RAY EXAM OF ANKLE: CPT

## 2021-09-27 NOTE — PROGRESS NOTES
[facility]        NAME: Lani Ramos is a 27 y o  female  : 1990    MRN: [id number]  DATE: 2021  TIME: 9:01 PM    Assessment and Plan   Injury Kerri Chadwick  1  Injury  XR foot 3+ vw left    XR ankle 3+ vw left     No fractures on x-ray  Official read pending  Supportive therapy encouraged  Patient Instructions     Follow up with PCP in 3-5 days  Proceed to  ER if symptoms worsen  Chief Complaint     Chief Complaint   Patient presents with    Ankle Pain     pt states slipped on hardwood floor and twisted left foot and ankle    Foot Pain         History of Present Illness     26-year-old female with pertinent history of multiple fractures of the lower extremities presents today due to a fall and twisting of the left ankle which occurred about a few hours ago  Reports having immediate swelling and pain for which she applied ice  Presents today to rule out fractures  Review of Systems   Review of Systems   Constitutional: Negative for chills and fever  Respiratory: Negative for cough, shortness of breath and wheezing  Cardiovascular: Negative for chest pain  Gastrointestinal: Negative for abdominal pain  Musculoskeletal: Positive for arthralgias  Negative for joint swelling  Skin: Negative for color change and rash         Current Medications       Current Outpatient Medications:     ondansetron (ZOFRAN-ODT) 4 mg disintegrating tablet, Take 1 tablet (4 mg total) by mouth every 6 (six) hours as needed for nausea or vomiting, Disp: 20 tablet, Rfl: 0    loratadine (CLARITIN) 10 mg tablet, Take 10 mg by mouth daily, Disp: , Rfl:     Current Allergies     Allergies as of 2021 - Reviewed 2021   Allergen Reaction Noted    Ciprofloxacin Rash 2019            The following portions of the patient's history were reviewed and updated as appropriate: allergies, current medications, past family history, past medical history, past social history, past surgical history and problem list      Past Medical History:   Diagnosis Date    Foot fracture, right     Known health problems: none     Metatarsalgia     Self-mutilation     cutting        Past Surgical History:   Procedure Laterality Date    DENTAL SURGERY      EGD      HERNIA REPAIR Right 12/17/2020    Procedure: REPAIR HERNIA INGUINAL;  Surgeon: Staci Sneed DO;  [facility];  Service: General    [place] EXC TUMOR SOFT TISSUE LEG/ANKLE SUBQ 3+CM Right 12/17/2020    Procedure: EXCISION BIOPSY TISSUE LESION/MASS LOWER EXTREMITY (right inguinal region); Surgeon: Staci Sneed DO;  [facility];  Service: General       Family History   Problem Relation Age of Onset    Bipolar disorder Brother     Gallbladder disease Family     Cancer Mother     No Known Problems Father          Medications have been verified  Objective   /73   Pulse 64   Temp 98 7 °F (37 1 °C)   Resp 16   LMP 09/19/2021 (Approximate)   SpO2 99%   Patient's last menstrual period was 09/19/2021 (approximate)  Physical Exam     Physical Exam  Vitals and nursing note reviewed  Constitutional:       General: She is in acute distress (Mild antalgic gait)  Appearance: Normal appearance  She is normal weight  She is not ill-appearing, toxic-appearing or diaphoretic  HENT:      Head: Normocephalic and atraumatic  Eyes:      General:         Right eye: No discharge  Left eye: No discharge  Conjunctiva/sclera: Conjunctivae normal    Pulmonary:      Effort: Pulmonary effort is normal    Abdominal:      General: Abdomen is flat  Musculoskeletal:         General: Tenderness (Lateral malleolus, MTP joints of left foot) present  Skin:     General: Skin is warm  Findings: No erythema  Neurological:      General: No focal deficit present  Mental Status: She is alert and oriented to person, place, and time     Psychiatric:         Mood and Affect: Mood normal  Behavior: Behavior normal          Thought Content:  Thought content normal          Judgment: Judgment normal

## 2021-09-28 ENCOUNTER — OFFICE VISIT (OUTPATIENT)
Dept: OBGYN CLINIC | Facility: CLINIC | Age: 31
End: 2021-09-28
Payer: COMMERCIAL

## 2021-09-28 VITALS
WEIGHT: 184 LBS | SYSTOLIC BLOOD PRESSURE: 110 MMHG | DIASTOLIC BLOOD PRESSURE: 76 MMHG | HEART RATE: 60 BPM | BODY MASS INDEX: 28.88 KG/M2 | HEIGHT: 67 IN

## 2021-09-28 DIAGNOSIS — S92.355A NONDISPLACED FRACTURE OF FIFTH METATARSAL BONE, LEFT FOOT, INITIAL ENCOUNTER FOR CLOSED FRACTURE: Primary | ICD-10-CM

## 2021-09-28 PROCEDURE — 28470 CLTX METATARSAL FX WO MNP EA: CPT | Performed by: ORTHOPAEDIC SURGERY

## 2021-09-28 PROCEDURE — 99243 OFF/OP CNSLTJ NEW/EST LOW 30: CPT | Performed by: ORTHOPAEDIC SURGERY

## 2021-09-28 NOTE — PROGRESS NOTES
GORDON Quintero  Attending, Orthopaedic Surgery  Foot and [address] [facility] [address] [facility]      ORTHOPAEDIC FOOT AND ANKLE CLINIC VISIT     Assessment:     Encounter Diagnosis   Name Primary?  Nondisplaced fracture of fifth metatarsal bone, left foot, initial encounter for closed fracture Yes            Plan:   · The patient verbalized understanding of exam findings and treatment plan  We engaged in the shared decision-making process and treatment options were discussed at length with the patient  Surgical and conservative management discussed today along with risks and benefits  · Del has forefoot abduction which overloads the lateral border of the foot   · X-ray shows previous fracture at the base of the 5th metatarsal, which she likely reinjured  It is possible that this is an os vesalianum rather than a zone 1 fracture  · Due to recent injury 2 days ago with tenderness to palpation over the 5th metatarsal base  · Recommend WBAT in cam boot  · ASA for DVT ppx  · Compression stocking for swelling control   Return in about 6 weeks (around 11/9/2021)  If she is still having pain, we will plan on ordering an MRI to evaluate os vesalianum      History of Present Illness:   Chief Complaint:   Chief Complaint   Patient presents with    Left Foot - Pain     Daniel Larson is a 27 y o  female who is being seen for left foot injury  Patient reports on Sunday (2 days ago), she slipped on wood floor and injured her foot  She was evaluated at urgent care and referred to our office for further treatment  Pain is localized at base of 5th metatarsal and at the ball of her foot with minimal radiating and described as sharp and severe  Patient denies numbness, tingling or radicular pain  Denies history of neuropathy  Patient does not smoke, does not have diabetes and does not take blood thinners  Patient denies family history of anesthesia complications and has not had any complications with anesthesia  Of note, she previously sprained her left ankle but she is not aware of any previous fracture of the left 5th metatarsal      Pain/symptom timing:  Worse during the day when active  Pain/symptom context:  Worse with activites and work  Pain/symptom modifying factors:  Rest makes better, activities make worse  Pain/symptom associated signs/symptoms: none    Prior treatment   · NSAIDsNo   · Injections No   · Bracing/Orthotics No    · Physical Therapy No     Orthopedic Surgical History:   See below    Past Medical, Surgical and Social History:  Past Medical History:  has a past medical history of Foot fracture, right, Known health problems: none, Metatarsalgia, and Self-mutilation  Problem List: does not have any pertinent problems on file  Past Surgical History:  has a past surgical history that includes Dental surgery; EGD; pr exc tumor soft tissue leg/ankle subq 3+cm (Right, 12/17/2020); and Hernia repair (Right, 12/17/2020)  Family History: family history includes Bipolar disorder in her brother; Cancer in her mother; Gallbladder disease in her family; No Known Problems in her father  Social History:  reports that she has never smoked  She has never used smokeless tobacco  She reports current alcohol use  She reports that she does not use drugs  Current Medications: has a current medication list which includes the following prescription(s): loratadine and ondansetron  Allergies: is allergic to ciprofloxacin       Review of Systems:  General- denies fever/chills  HEENT- denies hearing loss or sore throat  Eyes- denies eye pain or visual disturbances, denies red eyes  Respiratory- denies cough or SOB  Cardio- denies chest pain or palpitations  GI- denies abdominal pain  Endocrine- denies urinary frequency  Urinary- denies pain with urination  Musculoskeletal- Negative except noted above  Skin- denies rashes or wounds  Neurological- denies dizziness or headache  Psychiatric- denies anxiety or difficulty concentrating    Physical Exam:   /76 (BP Location: Left arm, Patient Position: Sitting, Cuff Size: Adult)   Pulse 60   Ht 5' 7" (1 702 m)   Wt 83 5 kg (184 lb)   LMP 09/19/2021 (Approximate)   BMI 28 82 kg/m²   General/Constitutional: No apparent distress: well-nourished and well developed  Eyes: normal ocular motion  Cardio: RRR, Normal S1S2, No m/r/g  Lymphatic: No appreciable lymphadenopathy  Respiratory: Non-labored breathing, CTA b/l no w/c/r  Vascular: No edema, swelling or tenderness, except as noted in detailed exam   Integumentary: No impressive skin lesions present, except as noted in detailed exam   Neuro: No ataxia or tremors noted  Psych: Normal mood and affect, oriented to person, place and time  Appropriate affect  Musculoskeletal: Normal, except as noted in detailed exam and in HPI  Examination    Left    Gait Antalgic   Musculoskeletal Tender to palpation at base of 5th metatarsal  Forefoot abduction noted  Skin Normal       Nails Normal    Range of Motion  20 degrees dorsiflexion, 40 degrees plantarflexion  Subtalar motion: normal    Stability Stable    Muscle Strength 5/5 tibialis anterior  5/5 gastrocnemius-soleus  5/5 posterior tibialis  5/5 peroneal/eversion strength  5/5 EHL  5/5 FHL    Neurologic Normal    Sensation Intact to light touch throughout sural, saphenous, superficial peroneal, deep peroneal and medial/lateral plantar nerve distributions  [place]-Ld 5 07 filament (10g) testing  deferred  Cardiovascular Brisk capillary refill < 2 seconds,intact DP and PT pulses    Special Tests None      Imaging Studies:   3 views of the left foot and left ankle were taken, reviewed and interpreted independently that demonstrate old minimally displaced 5th metatarsal base fracture, zone 1, which may be an os vesalianum  Reviewed by me personally      Fracture / Dislocation Treatment    Date/Time: 9/28/2021 8:43 AM  Performed by: Alejandra Marino MD  Authorized by: Murray Lima R Lachman, MD     Patient Location:  Clinic  Other Assisting Provider: Yes (comment)    Verbal consent obtained?: Yes    Risks and benefits: Risks, benefits and alternatives were discussed    Consent given by:  Patient  Patient states understanding of procedure being performed: Yes    Radiology Images displayed and confirmed  If images not available, report reviewed: Yes    Patient identity confirmed:  Verbally with patient  Injury location:  Foot  Location details:  Left foot  Injury type:  Fracture  Fracture type: fifth metatarsal    Neurovascular status: Neurovascularly intact    Distal perfusion: normal    Neurological function: normal    Range of motion: normal    Manipulation performed?: No    Immobilization:  Brace (cam boot)  Neurovascular status: Neurovascularly intact    Distal perfusion: normal    Neurological function: normal    Range of motion: normal    Patient tolerance:  Patient tolerated the procedure well with no immediate complications          Sallyanne Papa Lachman, MD  Foot & Ankle Surgery   Department of [address] [place] [address]      I personally performed the service  Sallyanne Papa Lachman, MD

## 2021-09-28 NOTE — LETTER
September 28, 2021     Marc Howard, [address] [facility] [address] [facility]  [place]   [phone]  [phone]    Patient: Lani Ramos   YOB: 1990   Date of Visit: 9/28/2021       Dear Dr Jamal Dior: Thank you for referring Lani Ramos to me for evaluation  Below are my notes for this consultation  If you have questions, please do not hesitate to call me  I look forward to following your patient along with you  Sincerely,        Monica Stokes MD        CC: No Recipients  Monica Stokes MD  9/28/2021 10:05 AM  Signed        GORDON Davis  Attending, Orthopaedic Surgery  Foot and [address] [facility] [address] [facility]      ORTHOPAEDIC FOOT AND ANKLE CLINIC VISIT     Assessment:     Encounter Diagnosis   Name Primary?  Nondisplaced fracture of fifth metatarsal bone, left foot, initial encounter for closed fracture Yes            Plan:   · The patient verbalized understanding of exam findings and treatment plan  We engaged in the shared decision-making process and treatment options were discussed at length with the patient  Surgical and conservative management discussed today along with risks and benefits  · Ben has forefoot abduction which overloads the lateral border of the foot   · X-ray shows previous fracture at the base of the 5th metatarsal, which she likely reinjured  It is possible that this is an os vesalianum rather than a zone 1 fracture  · Due to recent injury 2 days ago with tenderness to palpation over the 5th metatarsal base  · Recommend WBAT in cam boot  · ASA for DVT ppx  · Compression stocking for swelling control   Return in about 6 weeks (around 11/9/2021)  If she is still having pain, we will plan on ordering an MRI to evaluate os vesalianum      History of Present Illness:   Chief Complaint:   Chief Complaint   Patient presents with    Left Foot - Pain     Lani Ramos is a 27 y o  female who is being seen for left foot injury   Patient reports on Sunday (2 days ago), she slipped on wood floor and injured her foot  She was evaluated at urgent care and referred to our office for further treatment  Pain is localized at base of 5th metatarsal and at the ball of her foot with minimal radiating and described as sharp and severe  Patient denies numbness, tingling or radicular pain  Denies history of neuropathy  Patient does not smoke, does not have diabetes and does not take blood thinners  Patient denies family history of anesthesia complications and has not had any complications with anesthesia  Of note, she previously sprained her left ankle but she is not aware of any previous fracture of the left 5th metatarsal      Pain/symptom timing:  Worse during the day when active  Pain/symptom context:  Worse with activites and work  Pain/symptom modifying factors:  Rest makes better, activities make worse  Pain/symptom associated signs/symptoms: none    Prior treatment   · NSAIDsNo   · Injections No   · Bracing/Orthotics No    · Physical Therapy No     Orthopedic Surgical History:   See below    Past Medical, Surgical and Social History:  Past Medical History:  has a past medical history of Foot fracture, right, Known health problems: none, Metatarsalgia, and Self-mutilation  Problem List: does not have any pertinent problems on file  Past Surgical History:  has a past surgical history that includes Dental surgery; EGD; pr exc tumor soft tissue leg/ankle subq 3+cm (Right, 12/17/2020); and Hernia repair (Right, 12/17/2020)  Family History: family history includes Bipolar disorder in her brother; Cancer in her mother; Gallbladder disease in her family; No Known Problems in her father  Social History:  reports that she has never smoked  She has never used smokeless tobacco  She reports current alcohol use  She reports that she does not use drugs    Current Medications: has a current medication list which includes the following prescription(s): loratadine and ondansetron  Allergies: is allergic to ciprofloxacin  Review of Systems:  General- denies fever/chills  HEENT- denies hearing loss or sore throat  Eyes- denies eye pain or visual disturbances, denies red eyes  Respiratory- denies cough or SOB  Cardio- denies chest pain or palpitations  GI- denies abdominal pain  Endocrine- denies urinary frequency  Urinary- denies pain with urination  Musculoskeletal- Negative except noted above  Skin- denies rashes or wounds  Neurological- denies dizziness or headache  Psychiatric- denies anxiety or difficulty concentrating    Physical Exam:   /76 (BP Location: Left arm, Patient Position: Sitting, Cuff Size: Adult)   Pulse 60   Ht 5' 7" (1 702 m)   Wt 83 5 kg (184 lb)   LMP 09/19/2021 (Approximate)   BMI 28 82 kg/m²   General/Constitutional: No apparent distress: well-nourished and well developed  Eyes: normal ocular motion  Cardio: RRR, Normal S1S2, No m/r/g  Lymphatic: No appreciable lymphadenopathy  Respiratory: Non-labored breathing, CTA b/l no w/c/r  Vascular: No edema, swelling or tenderness, except as noted in detailed exam   Integumentary: No impressive skin lesions present, except as noted in detailed exam   Neuro: No ataxia or tremors noted  Psych: Normal mood and affect, oriented to person, place and time  Appropriate affect  Musculoskeletal: Normal, except as noted in detailed exam and in HPI  Examination    Left    Gait Antalgic   Musculoskeletal Tender to palpation at base of 5th metatarsal  Forefoot abduction noted       Skin Normal       Nails Normal    Range of Motion  20 degrees dorsiflexion, 40 degrees plantarflexion  Subtalar motion: normal    Stability Stable    Muscle Strength 5/5 tibialis anterior  5/5 gastrocnemius-soleus  5/5 posterior tibialis  5/5 peroneal/eversion strength  5/5 EHL  5/5 FHL    Neurologic Normal    Sensation Intact to light touch throughout sural, saphenous, superficial peroneal, deep peroneal and medial/lateral plantar nerve distributions  [place]-Ld 5 07 filament (10g) testing  deferred  Cardiovascular Brisk capillary refill < 2 seconds,intact DP and PT pulses    Special Tests None      Imaging Studies:   3 views of the left foot and left ankle were taken, reviewed and interpreted independently that demonstrate old minimally displaced 5th metatarsal base fracture, zone 1, which may be an os vesalianum  Reviewed by me personally  Fracture / Dislocation Treatment    Date/Time: 9/28/2021 8:43 AM  Performed by: Daniel Cisneros MD  Authorized by: Daniel Cisneros MD     Patient Location:  Clinic  Other Assisting Provider: Yes (comment)    Verbal consent obtained?: Yes    Risks and benefits: Risks, benefits and alternatives were discussed    Consent given by:  Patient  Patient states understanding of procedure being performed: Yes    Radiology Images displayed and confirmed  If images not available, report reviewed: Yes    Patient identity confirmed:  Verbally with patient  Injury location:  Foot  Location details:  Left foot  Injury type:  Fracture  Fracture type: fifth metatarsal    Neurovascular status: Neurovascularly intact    Distal perfusion: normal    Neurological function: normal    Range of motion: normal    Manipulation performed?: No    Immobilization:  Brace (cam boot)  Neurovascular status: Neurovascularly intact    Distal perfusion: normal    Neurological function: normal    Range of motion: normal    Patient tolerance:  Patient tolerated the procedure well with no immediate complications          Quince Reels Lachman, MD  Foot & Ankle Surgery   Department of [address] [place] [address] personally performed the service  Quince Reels Lachman, MD

## 2021-09-28 NOTE — PATIENT INSTRUCTIONS
Weightbearing as tolerated in CAM boot until you are able to tolerate transition  Do not need to wear the boot for sleep or showering but should wear it any time you are walking on it  Recommend taking the following supplements: Vitamin D3 4000 units per day and Calcium 1200 mg per day  This will help with bone healing  Avoid NSAIDs like Motrin/Aleve/Ibuprofen  Avoid steroids/nicotine products/ rheumatoid medications like DMARDs if possible  Aspirin BID for blood clot prevention  Script provided      Knee high compression stocking 20/30mm HG of pressure

## 2021-09-28 NOTE — LETTER
September 28, 2021     Patient: Radha Kramer   YOB: 1990   Date of Visit: 9/28/2021       To Whom it May Concern:    Radha Kramer is under my professional care  She was seen in my office on 9/28/2021  She may return to work but she must be permitted to wear cam boot while at work  If you have any questions or concerns, please don't hesitate to call           Sincerely,          Lulu Borrero MD        CC: No Recipients

## 2021-10-02 ENCOUNTER — IMMUNIZATIONS (OUTPATIENT)
Dept: FAMILY MEDICINE CLINIC | Facility: HOSPITAL | Age: 31
End: 2021-10-02

## 2021-10-02 DIAGNOSIS — Z23 ENCOUNTER FOR IMMUNIZATION: Primary | ICD-10-CM

## 2021-10-02 PROCEDURE — 0001A SARS-COV-2 / COVID-19 MRNA VACCINE (PFIZER-BIONTECH) 30 MCG: CPT

## 2021-10-02 PROCEDURE — 91300 SARS-COV-2 / COVID-19 MRNA VACCINE (PFIZER-BIONTECH) 30 MCG: CPT

## 2021-10-11 DIAGNOSIS — R11.0 NAUSEA: ICD-10-CM

## 2021-10-11 RX ORDER — ONDANSETRON 4 MG/1
4 TABLET, ORALLY DISINTEGRATING ORAL EVERY 6 HOURS PRN
Qty: 20 TABLET | Refills: 0 | Status: SHIPPED | OUTPATIENT
Start: 2021-10-11 | End: 2021-11-11 | Stop reason: SDUPTHER

## 2021-10-26 ENCOUNTER — OFFICE VISIT (OUTPATIENT)
Dept: URGENT CARE | Facility: CLINIC | Age: 31
End: 2021-10-26
Payer: COMMERCIAL

## 2021-10-26 ENCOUNTER — APPOINTMENT (OUTPATIENT)
Dept: RADIOLOGY | Facility: CLINIC | Age: 31
End: 2021-10-26
Payer: COMMERCIAL

## 2021-10-26 VITALS
DIASTOLIC BLOOD PRESSURE: 76 MMHG | HEIGHT: 67 IN | BODY MASS INDEX: 29.03 KG/M2 | WEIGHT: 185 LBS | RESPIRATION RATE: 13 BRPM | SYSTOLIC BLOOD PRESSURE: 111 MMHG

## 2021-10-26 DIAGNOSIS — S99.921A INJURY OF RIGHT FOOT, INITIAL ENCOUNTER: ICD-10-CM

## 2021-10-26 DIAGNOSIS — S99.921A INJURY OF RIGHT FOOT, INITIAL ENCOUNTER: Primary | ICD-10-CM

## 2021-10-26 PROCEDURE — 73630 X-RAY EXAM OF FOOT: CPT

## 2021-10-26 PROCEDURE — 73610 X-RAY EXAM OF ANKLE: CPT

## 2021-10-26 PROCEDURE — G0382 LEV 3 HOSP TYPE B ED VISIT: HCPCS | Performed by: PHYSICIAN ASSISTANT

## 2021-11-01 ENCOUNTER — OFFICE VISIT (OUTPATIENT)
Dept: OBGYN CLINIC | Facility: MEDICAL CENTER | Age: 31
End: 2021-11-01
Payer: COMMERCIAL

## 2021-11-01 VITALS
BODY MASS INDEX: 29.03 KG/M2 | HEIGHT: 67 IN | HEART RATE: 74 BPM | DIASTOLIC BLOOD PRESSURE: 80 MMHG | SYSTOLIC BLOOD PRESSURE: 114 MMHG | WEIGHT: 185 LBS

## 2021-11-01 DIAGNOSIS — M25.471 PAIN AND SWELLING OF RIGHT ANKLE: ICD-10-CM

## 2021-11-01 DIAGNOSIS — M25.571 PAIN AND SWELLING OF RIGHT ANKLE: ICD-10-CM

## 2021-11-01 DIAGNOSIS — M76.71 PERONEAL TENDONITIS, RIGHT: Primary | ICD-10-CM

## 2021-11-01 DIAGNOSIS — M77.51 OS PERONEUM SYNDROME OF RIGHT FOOT: ICD-10-CM

## 2021-11-01 PROCEDURE — 99214 OFFICE O/P EST MOD 30 MIN: CPT | Performed by: EMERGENCY MEDICINE

## 2021-11-01 RX ORDER — CELECOXIB 100 MG/1
100 CAPSULE ORAL 2 TIMES DAILY
Qty: 30 CAPSULE | Refills: 0 | Status: SHIPPED | OUTPATIENT
Start: 2021-11-01

## 2021-11-09 ENCOUNTER — OFFICE VISIT (OUTPATIENT)
Dept: OBGYN CLINIC | Facility: CLINIC | Age: 31
End: 2021-11-09

## 2021-11-09 VITALS
HEIGHT: 67 IN | RESPIRATION RATE: 17 BRPM | HEART RATE: 82 BPM | DIASTOLIC BLOOD PRESSURE: 80 MMHG | WEIGHT: 178 LBS | BODY MASS INDEX: 27.94 KG/M2 | SYSTOLIC BLOOD PRESSURE: 118 MMHG

## 2021-11-09 DIAGNOSIS — M84.374A STRESS FRACTURE OF METATARSAL BONE OF RIGHT FOOT, INITIAL ENCOUNTER: Primary | ICD-10-CM

## 2021-11-09 DIAGNOSIS — S92.355A NONDISPLACED FRACTURE OF FIFTH METATARSAL BONE, LEFT FOOT, INITIAL ENCOUNTER FOR CLOSED FRACTURE: ICD-10-CM

## 2021-11-09 PROCEDURE — 99024 POSTOP FOLLOW-UP VISIT: CPT | Performed by: ORTHOPAEDIC SURGERY

## 2021-11-10 ENCOUNTER — HOSPITAL ENCOUNTER (OUTPATIENT)
Dept: MRI IMAGING | Facility: HOSPITAL | Age: 31
Discharge: HOME | End: 2021-11-10
Attending: ORTHOPAEDIC SURGERY
Payer: COMMERCIAL

## 2021-11-10 DIAGNOSIS — M84.374A STRESS FRACTURE OF METATARSAL BONE OF RIGHT FOOT, INITIAL ENCOUNTER: ICD-10-CM

## 2021-11-10 PROCEDURE — G1004 CDSM NDSC: HCPCS

## 2021-11-10 PROCEDURE — 73718 MRI LOWER EXTREMITY W/O DYE: CPT

## 2021-11-11 DIAGNOSIS — R11.0 NAUSEA: ICD-10-CM

## 2021-11-12 ENCOUNTER — OFFICE VISIT (OUTPATIENT)
Dept: OBGYN CLINIC | Facility: CLINIC | Age: 31
End: 2021-11-12
Payer: COMMERCIAL

## 2021-11-12 ENCOUNTER — APPOINTMENT (OUTPATIENT)
Dept: RADIOLOGY | Facility: CLINIC | Age: 31
End: 2021-11-12
Payer: COMMERCIAL

## 2021-11-12 ENCOUNTER — PREP FOR PROCEDURE (OUTPATIENT)
Dept: OBGYN CLINIC | Facility: CLINIC | Age: 31
End: 2021-11-12

## 2021-11-12 DIAGNOSIS — Q68.8 PAINFUL OS PERONEUM SYNDROME: ICD-10-CM

## 2021-11-12 DIAGNOSIS — Q68.8 PAINFUL OS PERONEUM SYNDROME: Primary | ICD-10-CM

## 2021-11-12 PROCEDURE — 73630 X-RAY EXAM OF FOOT: CPT

## 2021-11-12 PROCEDURE — 99213 OFFICE O/P EST LOW 20 MIN: CPT | Performed by: ORTHOPAEDIC SURGERY

## 2021-11-12 RX ORDER — ONDANSETRON 4 MG/1
4 TABLET, ORALLY DISINTEGRATING ORAL EVERY 6 HOURS PRN
Qty: 20 TABLET | Refills: 0 | Status: SHIPPED | OUTPATIENT
Start: 2021-11-12 | End: 2022-04-28 | Stop reason: SDUPTHER

## 2021-11-12 RX ORDER — CHLORHEXIDINE GLUCONATE 0.12 MG/ML
15 RINSE ORAL ONCE
Status: CANCELLED | OUTPATIENT
Start: 2021-11-12 | End: 2021-11-12

## 2021-11-12 RX ORDER — CHLORHEXIDINE GLUCONATE 4 G/100ML
SOLUTION TOPICAL DAILY PRN
Status: CANCELLED | OUTPATIENT
Start: 2021-11-12

## 2021-11-15 ENCOUNTER — ANESTHESIA EVENT (OUTPATIENT)
Dept: PERIOP | Facility: AMBULARY SURGERY CENTER | Age: 31
End: 2021-11-15
Payer: COMMERCIAL

## 2021-11-16 ENCOUNTER — TELEPHONE (OUTPATIENT)
Dept: OBGYN CLINIC | Facility: HOSPITAL | Age: 31
End: 2021-11-16

## 2021-11-18 ENCOUNTER — HOSPITAL ENCOUNTER (OUTPATIENT)
Facility: AMBULARY SURGERY CENTER | Age: 31
Setting detail: OUTPATIENT SURGERY
Discharge: HOME | End: 2021-11-18
Attending: ORTHOPAEDIC SURGERY | Admitting: ORTHOPAEDIC SURGERY
Payer: COMMERCIAL

## 2021-11-18 ENCOUNTER — APPOINTMENT (OUTPATIENT)
Dept: RADIOLOGY | Facility: AMBULARY SURGERY CENTER | Age: 31
End: 2021-11-18
Payer: COMMERCIAL

## 2021-11-18 ENCOUNTER — ANESTHESIA (OUTPATIENT)
Dept: PERIOP | Facility: AMBULARY SURGERY CENTER | Age: 31
End: 2021-11-18
Payer: COMMERCIAL

## 2021-11-18 VITALS
DIASTOLIC BLOOD PRESSURE: 82 MMHG | SYSTOLIC BLOOD PRESSURE: 113 MMHG | HEART RATE: 68 BPM | OXYGEN SATURATION: 99 % | RESPIRATION RATE: 18 BRPM | TEMPERATURE: 97 F

## 2021-11-18 DIAGNOSIS — M79.671 PAIN IN RIGHT FOOT: Primary | ICD-10-CM

## 2021-11-18 LAB
EXT PREGNANCY TEST URINE: NEGATIVE
EXT. CONTROL: NORMAL

## 2021-11-18 PROCEDURE — 27691 REVISE LOWER LEG TENDON: CPT | Performed by: PHYSICIAN ASSISTANT

## 2021-11-18 PROCEDURE — C9290 INJ, BUPIVACAINE LIPOSOME: HCPCS | Performed by: STUDENT IN AN ORGANIZED HEALTH CARE EDUCATION/TRAINING PROGRAM

## 2021-11-18 PROCEDURE — 27691 REVISE LOWER LEG TENDON: CPT | Performed by: ORTHOPAEDIC SURGERY

## 2021-11-18 PROCEDURE — 81025 URINE PREGNANCY TEST: CPT | Performed by: ORTHOPAEDIC SURGERY

## 2021-11-18 PROCEDURE — C1713 ANCHOR/SCREW BN/BN,TIS/BN: HCPCS | Performed by: ORTHOPAEDIC SURGERY

## 2021-11-18 PROCEDURE — 28122 PARTIAL REMOVAL OF FOOT BONE: CPT | Performed by: PHYSICIAN ASSISTANT

## 2021-11-18 PROCEDURE — 28122 PARTIAL REMOVAL OF FOOT BONE: CPT | Performed by: ORTHOPAEDIC SURGERY

## 2021-11-18 DEVICE — IMPLANTABLE DEVICE: Type: IMPLANTABLE DEVICE | Site: FOOT | Status: FUNCTIONAL

## 2021-11-18 RX ORDER — ONDANSETRON 4 MG/1
4 TABLET, FILM COATED ORAL EVERY 8 HOURS PRN
Qty: 20 TABLET | Refills: 0 | Status: SHIPPED | OUTPATIENT
Start: 2021-11-18 | End: 2022-06-13 | Stop reason: SDUPTHER

## 2021-11-18 RX ORDER — ASPIRIN 325 MG
325 TABLET, DELAYED RELEASE (ENTERIC COATED) ORAL EVERY 12 HOURS
Qty: 84 TABLET | Refills: 0 | Status: SHIPPED | OUTPATIENT
Start: 2021-11-18 | End: 2022-06-13

## 2021-11-18 RX ORDER — CEFAZOLIN SODIUM 2 G/50ML
2000 SOLUTION INTRAVENOUS ONCE
Status: COMPLETED | OUTPATIENT
Start: 2021-11-18 | End: 2021-11-18

## 2021-11-18 RX ORDER — LIDOCAINE HYDROCHLORIDE 10 MG/ML
INJECTION, SOLUTION EPIDURAL; INFILTRATION; INTRACAUDAL; PERINEURAL AS NEEDED
Status: DISCONTINUED | OUTPATIENT
Start: 2021-11-18 | End: 2021-11-18

## 2021-11-18 RX ORDER — FENTANYL CITRATE 50 UG/ML
INJECTION, SOLUTION INTRAMUSCULAR; INTRAVENOUS AS NEEDED
Status: DISCONTINUED | OUTPATIENT
Start: 2021-11-18 | End: 2021-11-18

## 2021-11-18 RX ORDER — CHLORHEXIDINE GLUCONATE 0.12 MG/ML
15 RINSE ORAL ONCE
Status: DISCONTINUED | OUTPATIENT
Start: 2021-11-18 | End: 2021-11-18 | Stop reason: HOSPADM

## 2021-11-18 RX ORDER — MIDAZOLAM HYDROCHLORIDE 2 MG/2ML
INJECTION, SOLUTION INTRAMUSCULAR; INTRAVENOUS AS NEEDED
Status: DISCONTINUED | OUTPATIENT
Start: 2021-11-18 | End: 2021-11-18

## 2021-11-18 RX ORDER — BUPIVACAINE HYDROCHLORIDE 5 MG/ML
INJECTION, SOLUTION PERINEURAL
Status: COMPLETED | OUTPATIENT
Start: 2021-11-18 | End: 2021-11-18

## 2021-11-18 RX ORDER — ONDANSETRON 2 MG/ML
INJECTION INTRAMUSCULAR; INTRAVENOUS AS NEEDED
Status: DISCONTINUED | OUTPATIENT
Start: 2021-11-18 | End: 2021-11-18

## 2021-11-18 RX ORDER — PROPOFOL 10 MG/ML
INJECTION, EMULSION INTRAVENOUS AS NEEDED
Status: DISCONTINUED | OUTPATIENT
Start: 2021-11-18 | End: 2021-11-18

## 2021-11-18 RX ORDER — FENTANYL CITRATE/PF 50 MCG/ML
25 SYRINGE (ML) INJECTION
Status: DISCONTINUED | OUTPATIENT
Start: 2021-11-18 | End: 2021-11-18 | Stop reason: HOSPADM

## 2021-11-18 RX ORDER — DEXAMETHASONE SODIUM PHOSPHATE 4 MG/ML
INJECTION, SOLUTION INTRA-ARTICULAR; INTRALESIONAL; INTRAMUSCULAR; INTRAVENOUS; SOFT TISSUE AS NEEDED
Status: DISCONTINUED | OUTPATIENT
Start: 2021-11-18 | End: 2021-11-18

## 2021-11-18 RX ORDER — ONDANSETRON 2 MG/ML
4 INJECTION INTRAMUSCULAR; INTRAVENOUS ONCE AS NEEDED
Status: DISCONTINUED | OUTPATIENT
Start: 2021-11-18 | End: 2021-11-18 | Stop reason: HOSPADM

## 2021-11-18 RX ORDER — CEFAZOLIN SODIUM 1 G/50ML
1000 SOLUTION INTRAVENOUS ONCE
Status: DISCONTINUED | OUTPATIENT
Start: 2021-11-18 | End: 2021-11-18

## 2021-11-18 RX ORDER — OXYCODONE HYDROCHLORIDE 5 MG/1
5 TABLET ORAL EVERY 4 HOURS PRN
Qty: 30 TABLET | Refills: 0 | Status: SHIPPED | OUTPATIENT
Start: 2021-11-18 | End: 2021-11-23

## 2021-11-18 RX ORDER — SODIUM CHLORIDE, SODIUM LACTATE, POTASSIUM CHLORIDE, CALCIUM CHLORIDE 600; 310; 30; 20 MG/100ML; MG/100ML; MG/100ML; MG/100ML
INJECTION, SOLUTION INTRAVENOUS CONTINUOUS PRN
Status: DISCONTINUED | OUTPATIENT
Start: 2021-11-18 | End: 2021-11-18

## 2021-11-18 RX ORDER — VANCOMYCIN HYDROCHLORIDE 1 G/20ML
INJECTION, POWDER, LYOPHILIZED, FOR SOLUTION INTRAVENOUS AS NEEDED
Status: DISCONTINUED | OUTPATIENT
Start: 2021-11-18 | End: 2021-11-18 | Stop reason: HOSPADM

## 2021-11-18 RX ORDER — CHLORHEXIDINE GLUCONATE 4 G/100ML
SOLUTION TOPICAL DAILY PRN
Status: DISCONTINUED | OUTPATIENT
Start: 2021-11-18 | End: 2021-11-18 | Stop reason: HOSPADM

## 2021-11-18 RX ADMIN — SODIUM CHLORIDE, SODIUM LACTATE, POTASSIUM CHLORIDE, AND CALCIUM CHLORIDE: .6; .31; .03; .02 INJECTION, SOLUTION INTRAVENOUS at 07:15

## 2021-11-18 RX ADMIN — BUPIVACAINE 20 ML: 13.3 INJECTION, SUSPENSION, LIPOSOMAL INFILTRATION at 07:13

## 2021-11-18 RX ADMIN — PROPOFOL 160 MG: 10 INJECTION, EMULSION INTRAVENOUS at 07:26

## 2021-11-18 RX ADMIN — MIDAZOLAM HYDROCHLORIDE 2 MG: 1 INJECTION, SOLUTION INTRAMUSCULAR; INTRAVENOUS at 07:22

## 2021-11-18 RX ADMIN — ONDANSETRON 4 MG: 2 INJECTION INTRAMUSCULAR; INTRAVENOUS at 07:22

## 2021-11-18 RX ADMIN — DEXAMETHASONE SODIUM PHOSPHATE 4 MG: 4 INJECTION INTRA-ARTICULAR; INTRALESIONAL; INTRAMUSCULAR; INTRAVENOUS; SOFT TISSUE at 07:31

## 2021-11-18 RX ADMIN — LIDOCAINE HYDROCHLORIDE 50 MG: 10 INJECTION, SOLUTION EPIDURAL; INFILTRATION; INTRACAUDAL at 07:26

## 2021-11-18 RX ADMIN — BUPIVACAINE HYDROCHLORIDE 10 ML: 5 INJECTION, SOLUTION PERINEURAL at 07:13

## 2021-11-18 RX ADMIN — FENTANYL CITRATE 25 MCG: 50 INJECTION, SOLUTION INTRAMUSCULAR; INTRAVENOUS at 07:30

## 2021-11-18 RX ADMIN — CEFAZOLIN SODIUM 2000 MG: 2 SOLUTION INTRAVENOUS at 07:21

## 2021-12-08 ENCOUNTER — OFFICE VISIT (OUTPATIENT)
Dept: OBGYN CLINIC | Facility: CLINIC | Age: 31
End: 2021-12-08

## 2021-12-08 VITALS
SYSTOLIC BLOOD PRESSURE: 140 MMHG | BODY MASS INDEX: 27.94 KG/M2 | HEIGHT: 67 IN | DIASTOLIC BLOOD PRESSURE: 87 MMHG | HEART RATE: 60 BPM | WEIGHT: 178 LBS

## 2021-12-08 DIAGNOSIS — M76.71 PERONEAL TENDONITIS, RIGHT: Primary | ICD-10-CM

## 2021-12-08 PROCEDURE — 99024 POSTOP FOLLOW-UP VISIT: CPT | Performed by: ORTHOPAEDIC SURGERY

## 2021-12-29 ENCOUNTER — OFFICE VISIT (OUTPATIENT)
Dept: OBGYN CLINIC | Facility: CLINIC | Age: 31
End: 2021-12-29

## 2021-12-29 ENCOUNTER — APPOINTMENT (OUTPATIENT)
Dept: RADIOLOGY | Facility: AMBULARY SURGERY CENTER | Age: 31
End: 2021-12-29
Attending: ORTHOPAEDIC SURGERY
Payer: COMMERCIAL

## 2021-12-29 VITALS
HEART RATE: 66 BPM | DIASTOLIC BLOOD PRESSURE: 80 MMHG | WEIGHT: 178 LBS | HEIGHT: 67 IN | BODY MASS INDEX: 27.94 KG/M2 | SYSTOLIC BLOOD PRESSURE: 113 MMHG

## 2021-12-29 DIAGNOSIS — Q68.8 PAINFUL OS PERONEUM SYNDROME: ICD-10-CM

## 2021-12-29 DIAGNOSIS — S92.355A NONDISPLACED FRACTURE OF FIFTH METATARSAL BONE, LEFT FOOT, INITIAL ENCOUNTER FOR CLOSED FRACTURE: Primary | ICD-10-CM

## 2021-12-29 DIAGNOSIS — S92.355A NONDISPLACED FRACTURE OF FIFTH METATARSAL BONE, LEFT FOOT, INITIAL ENCOUNTER FOR CLOSED FRACTURE: ICD-10-CM

## 2021-12-29 PROCEDURE — 73630 X-RAY EXAM OF FOOT: CPT

## 2021-12-29 PROCEDURE — 99024 POSTOP FOLLOW-UP VISIT: CPT | Performed by: ORTHOPAEDIC SURGERY

## 2022-01-07 ENCOUNTER — EVALUATION (OUTPATIENT)
Dept: PHYSICAL THERAPY | Facility: CLINIC | Age: 32
End: 2022-01-07
Payer: COMMERCIAL

## 2022-01-07 DIAGNOSIS — M79.671 RIGHT FOOT PAIN: Primary | ICD-10-CM

## 2022-01-07 DIAGNOSIS — Z47.89 ORTHOPEDIC AFTERCARE: ICD-10-CM

## 2022-01-07 PROCEDURE — 97110 THERAPEUTIC EXERCISES: CPT | Performed by: PHYSICAL THERAPIST

## 2022-01-07 PROCEDURE — 97161 PT EVAL LOW COMPLEX 20 MIN: CPT | Performed by: PHYSICAL THERAPIST

## 2022-01-07 NOTE — PROGRESS NOTES
PT Evaluation     Today's date: 2022  Patient name: Janae Mayfield  : 1990  MRN: [id number]  Referring provider: Daved Cockayne, MD  Dx:   Encounter Diagnosis     ICD-10-CM    1  Right foot pain  M79 [id number]    2  Orthopedic aftercare  [id number]                   Assessment  Assessment details: Pt presents with post-operative impairments of R foot and ankle strength, ROM, edema, WB tolerance, gait, balance, and pain  She will benefit from skilled PT services to address her impairments in order to decrease pain and restore function    Impairments: abnormal gait, abnormal muscle firing, abnormal or restricted ROM, activity intolerance, impaired balance, impaired physical strength, lacks appropriate home exercise program, pain with function, weight-bearing intolerance and poor body mechanics  Understanding of Dx/Px/POC: good   Prognosis: good    Goals  STG - 4 wks  1) pt will d/c CAM boot  2) pt will demonstrate 10 deg DF PROM  3) pt will demonstrate 10 DL heel raises    MTG - 8 wks  1) pt will demonstrate 20 sec SLS EO  2) pt will demonstrate 30 DL heel raises  3) pt will report a 1 mile ambulation tolerance    LTG - 12 wks  1) pt will demonstrate 1 min SLS EO  2) pt will demonstrate 10 SL heel raises  3) pt will demonstrate normalized gait  4) pt will resolve functional limitations and return to pre-morbid workout routine    Plan  Planned modality interventions: low level laser therapy  Planned therapy interventions: joint mobilization, manual therapy, balance/weight bearing training, body mechanics training, neuromuscular re-education, patient education, postural training, strengthening, stretching, therapeutic exercise, therapeutic activities, flexibility, functional ROM exercises, gait training and home exercise program  Frequency: 2x week  Duration in weeks: 12  Treatment plan discussed with: patient        Subjective Evaluation    History of Present Illness  Mechanism of injury: Pt is a 27 y o  female with unremarkable PMHx  She presents to PT s/p R os vesalianum excision with peroneus brevis transfer to cuboid 21  She is currently PWB in CAM boot until 22  Pain  Current pain ratin  At best pain ratin  At worst pain ratin (after work light-duty, mostly sitting)  Quality: sharp, throbbing, pulling and pressure  Relieving factors: ice and rest    Social Support    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)  Treatments  Previous treatment: immobilization  Current treatment: immobilization and medication  Current treatment comments: OTC acetaminophen for pain control  Patient Goals  Patient goals for therapy: decreased edema, decreased pain, improved balance, increased motion, increased strength, return to sport/leisure activities, independence with ADLs/IADLs and return to work          Objective     Observations     Right Ankle/Foot   Positive for edema (2+ lateral, dorsal foot) and incision (closed, no s/s infection)  Tenderness     Right Ankle/Foot   Tenderness in the plantar fascia  Neurological Testing     Sensation     Ankle/Foot     Right Ankle/Foot   Intact: light touch   Hypersensation: light touch    Comments   Right light touch: plantar, lateral, dorsal foot  Active Range of Motion     Right Ankle/Foot   Dorsiflexion (ke): 0 degrees with pain  Plantar flexion: 50 degrees with pain  Inversion: 20 degrees with pain  Eversion: 0 degrees     Passive Range of Motion     Right Ankle/Foot    Dorsiflexion (ke): 6 degrees with pain  Plantar flexion: 50 degrees with pain  Inversion: 35 degrees with pain  Eversion: 15 degrees with pain  Great toe extension: 90 degrees with pain    Strength/Myotome Testing     Right Ankle/Foot   Dorsiflexion: 3  Plantar flexion: 2+  Inversion: 2+  Eversion: 2-    General Comments: Ankle/Foot Comments   Gait: I with ACx2             Precautions:  PMHx: unremarkable  PWB in CAM boot until 22  WBAT in CAM book until 22 then wean off per protocol  Avoid excessive INV ROM  Dx: PWB in CAM boot    Manuals 1/7            Manual DF>PF PROM             Laser lateral R foot 2000J            Laser R plantar fascia 3000J                         Neuro Re-Ed                                                                                                        Ther Ex             Supine elevated PF/DF AROM 1x20            Supine elevated INV/NJ AROM: mid ROM only             Supine GA stretch 10"x3            Supine plantar fascia stretch 10"x3            Seated towel scrunches 1x30            Seated iso ankle JN 5"/  1x10            TB PF YTB  3x10 RTB  3x10           Seated toe raises             Seated heel raises                                                    Ther Activity                                       Gait Training             Gait training on floor with ACx1                          Modalities

## 2022-01-12 ENCOUNTER — APPOINTMENT (OUTPATIENT)
Dept: PHYSICAL THERAPY | Facility: CLINIC | Age: 32
End: 2022-01-12
Payer: COMMERCIAL

## 2022-01-14 ENCOUNTER — TELEMEDICINE (OUTPATIENT)
Dept: FAMILY MEDICINE CLINIC | Facility: CLINIC | Age: 32
End: 2022-01-14
Payer: COMMERCIAL

## 2022-01-14 ENCOUNTER — APPOINTMENT (OUTPATIENT)
Dept: PHYSICAL THERAPY | Facility: CLINIC | Age: 32
End: 2022-01-14
Payer: COMMERCIAL

## 2022-01-14 VITALS — BODY MASS INDEX: 27.94 KG/M2 | WEIGHT: 178 LBS | HEIGHT: 67 IN

## 2022-01-14 DIAGNOSIS — G43.909 MIGRAINE WITHOUT STATUS MIGRAINOSUS, NOT INTRACTABLE, UNSPECIFIED MIGRAINE TYPE: ICD-10-CM

## 2022-01-14 DIAGNOSIS — F41.9 ANXIETY: ICD-10-CM

## 2022-01-14 DIAGNOSIS — B34.9 VIRAL INFECTION: Primary | ICD-10-CM

## 2022-01-14 DIAGNOSIS — Z20.822 SUSPECTED COVID-19 VIRUS INFECTION: ICD-10-CM

## 2022-01-14 PROCEDURE — 99214 OFFICE O/P EST MOD 30 MIN: CPT | Performed by: PHYSICIAN ASSISTANT

## 2022-01-14 RX ORDER — ELETRIPTAN HYDROBROMIDE 20 MG/1
20 TABLET, FILM COATED ORAL ONCE AS NEEDED
Qty: 6 TABLET | Refills: 3 | Status: SHIPPED | OUTPATIENT
Start: 2022-01-14 | End: 2022-04-28 | Stop reason: SDUPTHER

## 2022-01-14 RX ORDER — ESCITALOPRAM OXALATE 10 MG/1
10 TABLET ORAL DAILY
Qty: 90 TABLET | Refills: 1 | Status: SHIPPED | OUTPATIENT
Start: 2022-01-14 | End: 2022-06-13

## 2022-01-14 RX ORDER — AMOXICILLIN 500 MG/1
CAPSULE ORAL
COMMUNITY
Start: 2022-01-13

## 2022-01-14 NOTE — ASSESSMENT & PLAN NOTE
Day 3 of symptoms  Patient is a [id number] [employer] employee but is closer to [facility] swabbing site  Her and her boss both have similar symptoms and they do drive together to work and will be going to get a swab together  Patient only with mild symptoms at this point time  If she is positive since she had COVID back in the beginning when it 1st started she knows exactly what to do vitamin supplements deep breathing sleeping on her side calling us if she needs any advice if she develops shortness of breath or chest pain  Otherwise she does not need a note to return to work and can return when she is fever free for 24 hours without fever reducing medications at least 5 days out from symptoms and overall feeling better

## 2022-01-14 NOTE — ASSESSMENT & PLAN NOTE
Patient has a history of migraine she has never tried anything prescription before but she did try a Relpax and it worked wonderfully and she is wondering if she can have a prescription for herself for this  Order sent to the pharmacy

## 2022-01-14 NOTE — PATIENT INSTRUCTIONS
Problem List Items Addressed This Visit        Cardiovascular and Mediastinum    Migraine     Patient has a history of migraine she has never tried anything prescription before but she did try a Relpax and it worked wonderfully and she is wondering if she can have a prescription for herself for this  Order sent to the pharmacy  Relevant Medications    eletriptan (RELPAX) 20 MG tablet    escitalopram (Lexapro) 10 mg tablet       Other    Anxiety     PT with increase in anxiety again  Stopped zoloft in the past due to worsening headaches  NO SI or HI  Would like to initiate lexapro 10 mg  Check in 6 weeks or when pt can drive again  Relevant Medications    escitalopram (Lexapro) 10 mg tablet    Suspected COVID-19 virus infection     Day 3 of symptoms  Patient is a [employer] employee but is closer to [facility] swabbing site  Her and her boss both have similar symptoms and they do drive together to work and will be going to get a swab together  Patient only with mild symptoms at this point time  If she is positive since she had COVID back in the beginning when it 1st started she knows exactly what to do vitamin supplements deep breathing sleeping on her side calling us if she needs any advice if she develops shortness of breath or chest pain  Otherwise she does not need a note to return to work and can return when she is fever free for 24 hours without fever reducing medications at least 5 days out from symptoms and overall feeling better             Other Visit Diagnoses     Viral infection    -  Primary    Relevant Orders    COVID Only - Collected at [facility] or [facility]

## 2022-01-14 NOTE — PROGRESS NOTES
COVID-19 Outpatient Progress Note    Assessment/Plan:    Problem List Items Addressed This Visit        Cardiovascular and Mediastinum    Migraine     Patient has a history of migraine she has never tried anything prescription before but she did try a Relpax and it worked wonderfully and she is wondering if she can have a prescription for herself for this  Order sent to the pharmacy  Relevant Medications    eletriptan (RELPAX) 20 MG tablet    escitalopram (Lexapro) 10 mg tablet       Other    Anxiety     PT with increase in anxiety again  Stopped zoloft in the past due to worsening headaches  NO SI or HI  Would like to initiate lexapro 10 mg  Check in 6 weeks or when pt can drive again  Relevant Medications    escitalopram (Lexapro) 10 mg tablet    Suspected COVID-19 virus infection     Day 3 of symptoms  Patient is a [facility] [employer] employee but is closer to [facility] swabbing site  Her and her boss both have similar symptoms and they do drive together to work and will be going to get a swab together  Patient only with mild symptoms at this point time  If she is positive since she had COVID back in the beginning when it 1st started she knows exactly what to do vitamin supplements deep breathing sleeping on her side calling us if she needs any advice if she develops shortness of breath or chest pain  Otherwise she does not need a note to return to work and can return when she is fever free for 24 hours without fever reducing medications at least 5 days out from symptoms and overall feeling better  Other Visit Diagnoses     Viral infection    -  Primary    Relevant Orders    COVID Only - Collected at Mobile Vans or [facility]         Disposition:     Referred patient to centralized site to test for COVID-19  I have spent 15 minutes directly with the patient   Greater than 50% of this time was spent in counseling/coordination of care regarding: instructions for management and risk factor reductions  Encounter provider Bruce Yanes PA-C    Provider located at [address] [facility]  [address] [place] [id number] [phone]    Recent Visits  No visits were found meeting these conditions  Showing recent visits within past 7 days and meeting all other requirements  Today's Visits  Date Type Provider Dept   01/14/22 1135 Cecil SAUL Fang Pg [facility]   Showing today's visits and meeting all other requirements  Future Appointments  No visits were found meeting these conditions  Showing future appointments within next 150 days and meeting all other requirements     This virtual check-in was done via [employer] and patient was informed that this is a secure, HIPAA-compliant platform  She agrees to proceed  Patient agrees to participate in a virtual check in via telephone or video visit instead of presenting to the office to address urgent/immediate medical needs  Patient is aware this is a billable service  After connecting through [facility], the patient was identified by name and date of birth  Alissa Frank was informed that this was a telemedicine visit and that the exam was being conducted confidentially over secure lines  My office door was closed  No one else was in the room  Alissa Frank acknowledged consent and understanding of privacy and security of the telemedicine visit  I informed the patient that I have reviewed her record in Epic and presented the opportunity for her to ask any questions regarding the visit today  The patient agreed to participate  Verification of patient location:  Patient is located in the following state in which I hold an active license: [place]    Subjective:   Alissa Frank is a 27 y o  female who is concerned about COVID-19  Patient's symptoms include fever, fatigue, sore throat and headache   Patient denies chills, malaise, congestion, rhinorrhea, anosmia, loss of taste, cough, shortness of breath, chest tightness, abdominal pain, nausea, vomiting, diarrhea and myalgias  - Date of symptom onset: 1/12/2022      COVID-19 vaccination status: Fully vaccinated with booster    Lab Results   Component Value Date    SARSCOV2 NOT DETECTED 09/09/2021     Past Medical History:   Diagnosis Date    Foot fracture, right     Known health problems: none     Metatarsalgia     Self-mutilation     cutting      Past Surgical History:   Procedure Laterality Date    DENTAL SURGERY      EGD      HERNIA REPAIR Right 12/17/2020    Procedure: REPAIR HERNIA INGUINAL;  Surgeon: Nicki Lin DO;  [facility];  Service: General    WA EXC TUMOR SOFT TISSUE LEG/ANKLE SUBQ 3+CM Right 12/17/2020    Procedure: EXCISION BIOPSY TISSUE LESION/MASS LOWER EXTREMITY (right inguinal region);   Surgeon: Nicki Lin DO;  [facility];  Service: General    WA REPAIR FLEX LEG Brianna Lowe Right 11/18/2021    Procedure: Excision of painful os vesalinium with peroneus brevis tendon transfer to cuboid;  Surgeon: Pasha Stevens MD;  Location: [facility];  Service: Orthopedics     Current Outpatient Medications   Medication Sig Dispense Refill    amoxicillin (AMOXIL) 500 mg capsule       aspirin (ECOTRIN) 325 mg EC tablet Take 1 tablet (325 mg total) by mouth every 12 (twelve) hours 84 tablet 0    celecoxib (CeleBREX) 100 mg capsule Take 1 capsule (100 mg total) by mouth 2 (two) times a day 30 capsule 0    eletriptan (RELPAX) 20 MG tablet Take 1 tablet (20 mg total) by mouth once as needed for migraine for up to 1 dose may repeat in 2 hours if necessary 6 tablet 3    escitalopram (Lexapro) 10 mg tablet Take 1 tablet (10 mg total) by mouth daily 90 tablet 1    ondansetron (ZOFRAN) 4 mg tablet Take 1 tablet (4 mg total) by mouth every 8 (eight) hours as needed for nausea or vomiting (Patient not taking: Reported on 12/29/2021 ) 20 tablet 0    ondansetron (ZOFRAN-ODT) 4 mg disintegrating tablet Take 1 tablet (4 mg total) by mouth every 6 (six) hours as needed for nausea or vomiting (Patient not taking: Reported on 12/29/2021 ) 20 tablet 0     No current facility-administered medications for this visit  Allergies   Allergen Reactions    Ciprofloxacin Rash       Review of Systems   Constitutional: Positive for fatigue and fever  Negative for appetite change, chills and diaphoresis  HENT: Positive for sore throat  Negative for congestion, ear pain, facial swelling, postnasal drip, rhinorrhea, sinus pressure, sinus pain, trouble swallowing and voice change  Eyes: Negative  Respiratory: Negative  Negative for cough, chest tightness, shortness of breath and wheezing  Cardiovascular: Negative  Negative for chest pain  Gastrointestinal: Negative  Negative for abdominal pain, diarrhea, nausea and vomiting  Endocrine: Negative  Genitourinary: Negative  Musculoskeletal: Negative  Negative for myalgias  Skin: Negative  Allergic/Immunologic: Negative  Neurological: Positive for headaches  Hematological: Negative  Psychiatric/Behavioral: Negative  Objective:    Vitals:    01/14/22 0819   Weight: 80 7 kg (178 lb)   Height: 5' 7" (1 702 m)       Physical Exam  Vitals and nursing note reviewed  Constitutional:       General: She is not in acute distress  Appearance: Normal appearance  HENT:      Head: Normocephalic and atraumatic  Eyes:      General:         Right eye: No discharge  Left eye: No discharge  Conjunctiva/sclera: Conjunctivae normal    Pulmonary:      Effort: Pulmonary effort is normal    Skin:     General: Skin is warm and dry  Neurological:      General: No focal deficit present  Mental Status: She is alert  Psychiatric:         Mood and Affect: Mood normal          Behavior: Behavior normal          Thought Content:  Thought content normal          Judgment: Judgment normal          VIRTUAL VISIT DISCLAIMER    Ulisses Dyer verbally agrees to participate in [facility]  Pt is aware that [facility] could be limited without vital signs or the ability to perform a full hands-on physical Raeanndarin Bocanegra understands she or the provider may request at any time to terminate the video visit and request the patient to seek care or treatment in person

## 2022-01-14 NOTE — ASSESSMENT & PLAN NOTE
PT with increase in anxiety again  Stopped zoloft in the past due to worsening headaches  NO SI or HI  Would like to initiate lexapro 10 mg  Check in 6 weeks or when pt can drive again

## 2022-02-09 ENCOUNTER — OFFICE VISIT (OUTPATIENT)
Dept: OBGYN CLINIC | Facility: CLINIC | Age: 32
End: 2022-02-09

## 2022-02-09 VITALS
HEIGHT: 67 IN | HEART RATE: 73 BPM | DIASTOLIC BLOOD PRESSURE: 75 MMHG | WEIGHT: 178 LBS | SYSTOLIC BLOOD PRESSURE: 110 MMHG | BODY MASS INDEX: 27.94 KG/M2

## 2022-02-09 DIAGNOSIS — S92.355A NONDISPLACED FRACTURE OF FIFTH METATARSAL BONE, LEFT FOOT, INITIAL ENCOUNTER FOR CLOSED FRACTURE: Primary | ICD-10-CM

## 2022-02-09 DIAGNOSIS — M84.374A STRESS FRACTURE OF METATARSAL BONE OF RIGHT FOOT, INITIAL ENCOUNTER: ICD-10-CM

## 2022-02-09 PROCEDURE — 99024 POSTOP FOLLOW-UP VISIT: CPT | Performed by: ORTHOPAEDIC SURGERY

## 2022-02-09 NOTE — LETTER
February 9, 2022     Patient: Dasia Mosley   YOB: 1990   Date of Visit: 2/9/2022       To Whom it May Concern:    Dasia Mosley is under my professional care  She was seen in my office on 2/9/2022  She may return to work but desk duty or sedentary duty only until 3/18/22  If you have any questions or concerns, please don't hesitate to call           Sincerely,          Evin Gardiner MD        CC: Colonel Fung

## 2022-02-09 NOTE — PROGRESS NOTES
GORDON Hinds  Attending, Orthopaedic Surgery  Foot and Ankle  [facility]      ORTHOPAEDIC FOOT AND ANKLE POST-OP VISIT     Procedure:       Right foot excision os vesalinium with peroneus brevis tendon transfer to cuboid       Date of surgery:   11/18/21    She will discontinue the boot and continue PT  Her recovery was set back due to a recent covid infection  PLAN  1  Weightbearing Status- WBAT operative extremity  2  DVT prophylaxis- complete  3  Continue to elevate 23hrs/day getting up 1x per hour to prevent a blood clot  4  Pain control- OTC pain medication  5  RTC in 3 month(s)  6  Xrays needed next visit - Internal oblique XR of foot  History of Present Illness:   Chief Complaint:   Chief Complaint   Patient presents with   [address] [place] [address]     Albert Espinoza is a 27 y o  female who is being seen for post-operative visit for the above procedure  Pain is well controlled and the patient has successfully transitioned to OTC pain medicines  she is completed DVT prophylaxis  Patient has been WBAT in a sneaker but is about 1 week behind on the progression out of the boot  She had COVID and is a bit behind on PT  Review of Systems:  General- denies fever/chills  Respiratory- denies cough or SOB  Cardio- denies chest pain or palpitations  GI- denies abdominal pain  Musculoskeletal- Negative except noted above  Skin- denies rashes or wounds    Physical Exam:   /75 (BP Location: Right arm, Patient Position: Sitting, Cuff Size: Adult)   Pulse 73   Ht 5' 7" (1 702 m)   Wt 80 7 kg (178 lb)   BMI 27 88 kg/m²   General/Constitutional: No apparent distress: well-nourished and well developed    Eyes: normal ocular motion  Lymphatic: No appreciable lymphadenopathy  Respiratory: Non-labored breathing  Vascular: No edema, swelling or tenderness, except as noted in detailed exam   Integumentary: No impressive skin lesions present, except as noted in detailed exam   Neuro: No ataxia or tremors noted  Psych: Normal mood and affect, oriented to person, place and time  Appropriate affect  Musculoskeletal: Normal, except as noted in detailed exam and in HPI  Examination    right        Incision Clean, dry, intact  Sutures Previously removed  Ecchymosis none    Swelling Mild    Sensation Intact to light touch throughout sural, saphenous, superficial peroneal, deep peroneal and medial/lateral plantar nerve distributions  [place]-L[place] 5 07 filament (10g) testing deferred  Cardiovascular Brisk capillary refill < 2 seconds,intact DP and PT pulses    Special Tests None          Scribe Attestation    I,:  Zelalem Reardon PA-C am acting as a scribe while in the presence of the attending physician :       I,:  Davis Carrel, MD personally performed the services described in this documentation    as scribed in my presence :             Angelina Cinnamon Lachman, MD  Foot & Ankle Surgery   Department [address]      I personally performed the service  Angelina Cinnamon Lachman, MD

## 2022-02-14 ENCOUNTER — OFFICE VISIT (OUTPATIENT)
Dept: PHYSICAL THERAPY | Facility: CLINIC | Age: 32
End: 2022-02-14
Payer: COMMERCIAL

## 2022-02-14 DIAGNOSIS — Z47.89 ORTHOPEDIC AFTERCARE: ICD-10-CM

## 2022-02-14 DIAGNOSIS — M79.671 RIGHT FOOT PAIN: Primary | ICD-10-CM

## 2022-02-14 PROCEDURE — 97140 MANUAL THERAPY 1/> REGIONS: CPT | Performed by: PHYSICAL THERAPIST

## 2022-02-14 PROCEDURE — 97110 THERAPEUTIC EXERCISES: CPT | Performed by: PHYSICAL THERAPIST

## 2022-02-14 NOTE — PROGRESS NOTES
Daily Note     Today's date: 2022  Patient name: Alex Ramirez  : 1990  MRN: [id number]  Referring provider: Teena Dickinson MD  Dx:   Encounter Diagnosis     ICD-10-CM    1  Right foot pain  M79 671    2  Orthopedic aftercare  Z47 89                   Subjective: Pt returns to PT after a 5-wk absence due to COVID infection  She presents with a sneaker and has been d/c'ed from CAM boot per MD  She reports 7/10 lateral and plantar R foot pain at worst after walking slowly x 1 mile  Objective: See treatment diary below  PROM: DF(ke): 8 deg  MMT: NJ: 3-/5  Ttp: hypersensitive along plantar fascia, lateral border of foot    Assessment: Pt demonstrates medial heel whip, excessive INV, and poor PF at R push-off as well as a continuation of post-op ROM and strength impairments  Plan: Cont  POC  Precautions:  PMHx: unremarkable  PWB in CAM boot until 22  WBAT in CAM book until 22 then wean off per protocol  Avoid excessive INV ROM  Dx: s/p R foot excision os vesalinium with peroneus brevis tendon transfer to cuboid 22    Manuals            Manual GA stretch  15"x4           Laser lateral R foot 2000J 3000J  15W           Laser R plantar fascia 3000J 3000J  15W                        Neuro Re-Ed                                                                                                        Ther Ex             Supine elevated PF/DF AROM 1x20 np           Supine elevated INV/NJ AROM: mid ROM only  np           Supine GA stretch 10"x3 20"x3           Supine plantar fascia stretch 10"x3 20"x3           Seated towel scrunches 1x30 1x60           Seated iso ankle NJ 5"/  1x10 5"/  2x10           TB PF YTB  3x10 np           ecc DL heel raises  2x10                                                               Ther Activity                                       Gait Training             AlterG  60% BW  1 5 mph  5 min 60% BW  1 5 10 min                       Modalities

## 2022-02-16 ENCOUNTER — OFFICE VISIT (OUTPATIENT)
Dept: PHYSICAL THERAPY | Facility: CLINIC | Age: 32
End: 2022-02-16
Payer: COMMERCIAL

## 2022-02-16 DIAGNOSIS — M79.671 RIGHT FOOT PAIN: Primary | ICD-10-CM

## 2022-02-16 DIAGNOSIS — Z47.89 ORTHOPEDIC AFTERCARE: ICD-10-CM

## 2022-02-16 PROCEDURE — 97110 THERAPEUTIC EXERCISES: CPT | Performed by: PHYSICAL THERAPIST

## 2022-02-16 NOTE — PROGRESS NOTES
Daily Note     Today's date: 2022  Patient name: Melquiades Abdi  : 1990  MRN: [id number]  Referring provider: Diana Simmons MD  Dx:   Encounter Diagnosis     ICD-10-CM    1  Right foot pain  M79 671    2  Orthopedic aftercare  Z47 89                   Subjective: Pt reports soreness following last visit that resolved with tylenol and ice  She requests shortened tx due to appt conflict  Objective: See treatment diary below    Assessment: Pt demonstrated slight L weight shift, good ecc control during DL heel raises  Plan: Resume full POC nv  Perform DL heel raises 3x10 on BioDex if unable to correct weight shift  Precautions:  PMHx: unremarkable  PWB in CAM boot until 22  WBAT in CAM book until 22 then wean off per protocol  Avoid excessive INV ROM  Dx: s/p R foot excision os vesalinium with peroneus brevis tendon transfer to cuboid 22    Manuals           Manual GA stretch  15"x4           Laser lateral R foot 2000J 3000J  15W 3000J  20W          Laser R plantar fascia 3000J 3000J  15W 3000J  20W                       Neuro Re-Ed                                                                                                        Ther Ex             Supine elevated PF/DF AROM 1x20 np           Supine elevated INV/NJ AROM: mid ROM only  np           Supine GA stretch 10"x3 20"x3 standing  10"x3          Supine plantar fascia stretch 10"x3 20"x3           Seated towel scrunches 1x30 1x60 1x60 1x100         Seated iso ankle NJ 5"/  1x10 5"/  2x10 5"/  2x10          TB PF YTB  3x10 np           ecc DL heel raises  2x10 2x10 3x10                                                             Ther Activity                                       Gait Training             AlterG  60% BW  1 5 mph  5 min nv 60% BW  1 5 10 min                      Modalities

## 2022-02-23 ENCOUNTER — OFFICE VISIT (OUTPATIENT)
Dept: PHYSICAL THERAPY | Facility: CLINIC | Age: 32
End: 2022-02-23
Payer: COMMERCIAL

## 2022-02-23 DIAGNOSIS — M79.671 RIGHT FOOT PAIN: ICD-10-CM

## 2022-02-23 DIAGNOSIS — Z47.89 ORTHOPEDIC AFTERCARE: Primary | ICD-10-CM

## 2022-02-23 PROCEDURE — 97112 NEUROMUSCULAR REEDUCATION: CPT | Performed by: PHYSICAL THERAPIST

## 2022-02-23 PROCEDURE — 97116 GAIT TRAINING THERAPY: CPT | Performed by: PHYSICAL THERAPIST

## 2022-02-23 PROCEDURE — 97110 THERAPEUTIC EXERCISES: CPT | Performed by: PHYSICAL THERAPIST

## 2022-02-23 NOTE — PROGRESS NOTES
Daily Note     Today's date: 2022  Patient name: Aroldo Pierce  : 1990  MRN: [id number]  Referring provider: Kristian Olivares MD  Dx:   Encounter Diagnosis     ICD-10-CM    1  Orthopedic aftercare  Z47 89    2  Right foot pain  M79 671                   Subjective: Pt reports significant swelling, moderate pain after being on her feet 2 5 hrs  Objective: See treatment diary below    Assessment: Pt demonstrate improved gait and WB tolerance  Plan: Cont  POC  Precautions:  PMHx: unremarkable  PWB in CAM boot until 22  WBAT in CAM book until 22 then wean off per protocol  Avoid excessive INV ROM  Dx: s/p R foot excision os vesalinium with peroneus brevis tendon transfer to cuboid 22    Manuals          Manual GA stretch  15"x4  10"x3         Laser lateral R foot 2000J 3000J  15W 3000J  20W 3000J  20W         Laser R plantar fascia 3000J 3000J  15W 3000J  20W 3000J  20W                      Neuro Re-Ed             BioDex FT EO    L5  60"x2         BioDex FT EC    L12  60"x2         BioDex tandem EO    L=S  60"x1                                                             Ther Ex             Supine elevated PF/DF AROM 1x20 np           Supine elevated INV/NJ AROM: mid ROM only  np           Supine GA stretch 10"x3 20"x3 standing  10"x3 standing  10"x3         Supine plantar fascia stretch 10"x3 20"x3           Seated towel scrunches 1x30 1x60 1x60 1x100         Seated iso ankle NJ 5"/  1x10 5"/  2x10 5"/  2x10 5"/  3x10         TB PF YTB  3x10 np           ecc DL heel raises  2x10 2x10 3x10                                                             Ther Activity                                       Gait Training             AlterG  60% BW  1 5 mph  5 min nv 60-75% BW  1 5-2 mph  10 min                      Modalities

## 2022-02-25 ENCOUNTER — OFFICE VISIT (OUTPATIENT)
Dept: PHYSICAL THERAPY | Facility: CLINIC | Age: 32
End: 2022-02-25
Payer: COMMERCIAL

## 2022-02-25 DIAGNOSIS — Z47.89 ORTHOPEDIC AFTERCARE: Primary | ICD-10-CM

## 2022-02-25 DIAGNOSIS — M79.671 RIGHT FOOT PAIN: ICD-10-CM

## 2022-02-25 PROCEDURE — 97140 MANUAL THERAPY 1/> REGIONS: CPT | Performed by: PHYSICAL THERAPIST

## 2022-02-25 PROCEDURE — 97116 GAIT TRAINING THERAPY: CPT | Performed by: PHYSICAL THERAPIST

## 2022-02-25 PROCEDURE — 97110 THERAPEUTIC EXERCISES: CPT | Performed by: PHYSICAL THERAPIST

## 2022-02-25 NOTE — PROGRESS NOTES
Daily Note     Today's date: 2022  Patient name: Izetta Curling  : 1990  MRN: [id number]  Referring provider: Adal Meza MD  Dx:   Encounter Diagnosis     ICD-10-CM    1  Orthopedic aftercare  Z47 89    2  Right foot pain  M79 671                   Subjective: Pt reports good response to adding balance TE last visit  Objective: See treatment diary below    Assessment: Pt demonstrated limited tolerance to NJ > PF strengthening secondary to pain  Plan: Cont  POC  Precautions:  PMHx: unremarkable  PWB in CAM boot until 22  WBAT in CAM book until 22 then wean off per protocol  Avoid excessive INV ROM  Dx: s/p R foot excision os vesalinium with peroneus brevis tendon transfer to cuboid 22    Manuals         Manual GA stretch  15"x4  10"x3 10"x3        Laser lateral R foot 2000J 3000J  15W 3000J  20W 3000J  20W 3000J  20W        Laser R plantar fascia 3000J 3000J  15W 3000J  20W 3000J  20W 3000J  20W                     Neuro Re-Ed             BioDex FT EO    L5  60"x2 L5  60"x1        BioDex FT EC    L12  60"x2 L12  60"x1        BioDex tandem EO    L=S  60"x1 L=S  60"x1                                                            Ther Ex             Supine elevated PF/DF AROM 1x20 np           Supine elevated INV/NJ AROM: mid ROM only  np           Supine GA stretch 10"x3 20"x3 standing  10"x3 standing  10"x3 standing  10"x3        Supine plantar fascia stretch 10"x3 20"x3           Seated towel scrunches 1x30 1x60 1x60 1x100 1x100        Seated iso ankle NJ 5"/  1x10 5"/  2x10 5"/  2x10 5"/  3x10         TB PF YTB  3x10 np           ecc DL heel raises  2x10 2x10 3x10 3x10        TB NJ     YTB  2x10                                               Ther Activity                                       Gait Training             AlterG  60% BW  1 5 mph  5 min nv 60-75% BW  1 5-2 mph  10 min 75% BW  1 7 mph  10 min                     Modalities

## 2022-03-01 ENCOUNTER — OFFICE VISIT (OUTPATIENT)
Dept: PHYSICAL THERAPY | Facility: CLINIC | Age: 32
End: 2022-03-01
Payer: COMMERCIAL

## 2022-03-01 DIAGNOSIS — Z47.89 ORTHOPEDIC AFTERCARE: Primary | ICD-10-CM

## 2022-03-01 DIAGNOSIS — M79.671 RIGHT FOOT PAIN: ICD-10-CM

## 2022-03-01 PROCEDURE — 97116 GAIT TRAINING THERAPY: CPT | Performed by: PHYSICAL THERAPIST

## 2022-03-01 PROCEDURE — 97140 MANUAL THERAPY 1/> REGIONS: CPT | Performed by: PHYSICAL THERAPIST

## 2022-03-01 PROCEDURE — 97110 THERAPEUTIC EXERCISES: CPT | Performed by: PHYSICAL THERAPIST

## 2022-03-01 NOTE — PROGRESS NOTES
Daily Note     Today's date: 3/1/2022  Patient name: Emmy Rosen  : 1990  MRN: [id number]  Referring provider: Rusty Capone MD  Dx:   Encounter Diagnosis     ICD-10-CM    1  Orthopedic aftercare  Z47 89    2  Right foot pain  M79 671                   Subjective: Pt reports good response to adding balance TE last visit  Objective: See treatment diary below    Assessment: Pt demonstrated limited tolerance to NJ > PF strengthening secondary to pain  Plan: Cont  POC  Precautions:  PMHx: unremarkable  PWB in CAM boot until 22  WBAT in CAM book until 22 then wean off per protocol  Avoid excessive INV ROM  Dx: s/p R foot excision os vesalinium with peroneus brevis tendon transfer to cuboid 22    Manuals 1/7 2/14 2/16 2/23 2/25 3/1       Manual GA stretch  15"x4  10"x3 10"x3        Laser lateral R foot 2000J 3000J  15W 3000J  20W 3000J  20W 3000J  20W 3500J  20W       Laser R plantar fascia 3000J 3000J  15W 3000J  20W 3000J  20W 3000J  20W 3000J  20W                    Neuro Re-Ed             BioDex FT EO    L5  60"x2 L5  60"x1 L3  60"x1       BioDex FT EC    L12  60"x2 L12  60"x1 L10  60"x1       BioDex tandem EO    L=S  60"x1 L=S  60"x1 L6  60"x1                                                           Ther Ex             Supine elevated PF/DF AROM 1x20 np           Supine elevated INV/NJ AROM: mid ROM only  np           Supine GA stretch 10"x3 20"x3 standing  10"x3 standing  10"x3 standing  10"x3 standing  10"x3       Supine plantar fascia stretch 10"x3 20"x3           Seated towel scrunches 1x30 1x60 1x60 1x100 1x100 1x100 1#  1x100      Seated iso ankle NJ 5"/  1x10 5"/  2x10 5"/  2x10 5"/  3x10         TB PF YTB  3x10 np           ecc DL heel raises  2x10 2x10 3x10 3x10 3x10       TB NJ     YTB  2x10 YTB  2x10                                              Ther Activity                                       Gait Training             AlterG  60% BW  1 5 mph  5 min nv 60-75% BW  1 5-2 mph  10 min 75% BW  1 7 mph  10 min 75% BW  1 7 mph  10 min                    Modalities

## 2022-03-04 ENCOUNTER — OFFICE VISIT (OUTPATIENT)
Dept: PHYSICAL THERAPY | Facility: CLINIC | Age: 32
End: 2022-03-04
Payer: COMMERCIAL

## 2022-03-04 DIAGNOSIS — M79.671 RIGHT FOOT PAIN: ICD-10-CM

## 2022-03-04 DIAGNOSIS — Z47.89 ORTHOPEDIC AFTERCARE: Primary | ICD-10-CM

## 2022-03-04 PROCEDURE — 97140 MANUAL THERAPY 1/> REGIONS: CPT

## 2022-03-04 PROCEDURE — 97110 THERAPEUTIC EXERCISES: CPT

## 2022-03-04 PROCEDURE — 97116 GAIT TRAINING THERAPY: CPT

## 2022-03-04 NOTE — PROGRESS NOTES
Daily Note     Today's date: 3/4/2022  Patient name: Milas Sicard  : 1990  MRN: [id number]  Referring provider: Syeda Little MD  Dx:   Encounter Diagnosis     ICD-10-CM    1  Orthopedic aftercare  Z47 89    2  Right foot pain  M79 671        Start Time: 0820  Stop Time: 0900  Total time in clinic (min): 40 minutes    Subjective: Patient reports soreness following an 8 hour shift  Objective: See treatment diary below    Assessment: Patient able to normalize gait pattern within the alter g  However, hip circumduction does become minutely present as fatigue sets in towards 10 minutes  Plan: Cont  POC  Precautions:  PMHx: unremarkable  PWB in CAM boot until 22  WBAT in CAM book until 22 then wean off per protocol  Avoid excessive INV ROM  Dx: s/p R foot excision os vesalinium with peroneus brevis tendon transfer to cuboid 22    Manuals 1/7 2/14 2/16 2/23 2/25 3/1 3/4      Manual GA stretch  15"x4  10"x3 10"x3        Laser lateral R foot 2000J 3000J  15W 3000J  20W 3000J  20W 3000J  20W 3500J  20W 3000J  20W      Laser R plantar fascia 3000J 3000J  15W 3000J  20W 3000J  20W 3000J  20W 3000J  20W 3000J  20W                   Neuro Re-Ed             BioDex FT EO    L5  60"x2 L5  60"x1 L3  60"x1 L2  60"x1      BioDex FT EC    L12  60"x2 L12  60"x1 L10  60"x1 L10  60"x1      BioDex tandem EO    L=S  60"x1 L=S  60"x1 L6  60"x1 L6  60"x1                                                          Ther Ex             Supine elevated PF/DF AROM 1x20 np           Supine elevated INV/NJ AROM: mid ROM only  np           Supine GA stretch 10"x3 20"x3 standing  10"x3 standing  10"x3 standing  10"x3 standing  10"x3 standing  10"x3      Supine plantar fascia stretch 10"x3 20"x3           Seated towel scrunches 1x30 1x60 1x60 1x100 1x100 1x100 1#  1x100      Seated iso ankle NJ 5"/  1x10 5"/  2x10 5"/  2x10 5"/  3x10         TB PF YTB  3x10 np           ecc DL heel raises  2x10 2x10 3x10 3x10 3x10 3x10      TB NJ     YTB  2x10 YTB  2x10 YTB  3x10                                             Ther Activity                                       Gait Training             AlterG  60% BW  1 5 mph  5 min nv 60-75% BW  1 5-2 mph  10 min 75% BW  1 7 mph  10 min 75% BW  1 7 mph  10 min 75%  BW  2 0 mph  10 min                   Modalities

## 2022-03-08 ENCOUNTER — APPOINTMENT (OUTPATIENT)
Dept: PHYSICAL THERAPY | Facility: CLINIC | Age: 32
End: 2022-03-08
Payer: COMMERCIAL

## 2022-03-09 ENCOUNTER — OFFICE VISIT (OUTPATIENT)
Dept: PHYSICAL THERAPY | Facility: CLINIC | Age: 32
End: 2022-03-09
Payer: COMMERCIAL

## 2022-03-09 DIAGNOSIS — M79.671 RIGHT FOOT PAIN: ICD-10-CM

## 2022-03-09 DIAGNOSIS — Z47.89 ORTHOPEDIC AFTERCARE: Primary | ICD-10-CM

## 2022-03-09 PROCEDURE — 97140 MANUAL THERAPY 1/> REGIONS: CPT

## 2022-03-09 PROCEDURE — 97116 GAIT TRAINING THERAPY: CPT

## 2022-03-09 PROCEDURE — 97110 THERAPEUTIC EXERCISES: CPT

## 2022-03-09 NOTE — PROGRESS NOTES
Daily Note     Today's date: 3/9/2022  Patient name: Ronald Camacho  : 1990  MRN: [id number]  Referring provider: January Alvares MD  Dx:   Encounter Diagnosis     ICD-10-CM    1  Orthopedic aftercare  Z47 89    2  Right foot pain  M79 671        Start Time: 08  Stop Time: 0910  Total time in clinic (min): 40 minutes    Subjective: Patient reports, "I walked about 14 miles over three days  My ankle was swollen and sore  I've been taking tylenol and using ice to help with pain"  Objective: See treatment diary below    Assessment: Patient had minor soreness at the last 2 minutes of the alter g  Showing improvement with TB eversion strength  TTP medial > lateral malleolus  Plan: Cont  POC  Precautions:  PMHx: unremarkable  PWB in CAM boot until 22  WBAT in CAM book until 22 then wean off per protocol  Avoid excessive INV ROM  Dx: s/p R foot excision os vesalinium with peroneus brevis tendon transfer to cuboid 22    Manuals 1/7 2/14 2/16 2/23 2/25 3/1 3/4 3/9     Manual GA stretch  15"x4  10"x3 10"x3        Laser lateral R foot 2000J 3000J  15W 3000J  20W 3000J  20W 3000J  20W 3500J  20W 3000J  20W 3000J  20W     Laser R plantar fascia 3000J 3000J  15W 3000J  20W 3000J  20W 3000J  20W 3000J  20W 3000J  20W 3000J  20W                  Neuro Re-Ed             BioDex FT EO    L5  60"x2 L5  60"x1 L3  60"x1 L2  60"x1 L2  60"x1     BioDex FT EC    L12  60"x2 L12  60"x1 L10  60"x1 L10  60"x1 L8  60"x1     BioDex tandem EO    L=S  60"x1 L=S  60"x1 L6  60"x1 L6  60"x1 L6  60"x1                                                         Ther Ex             Supine elevated PF/DF AROM 1x20 np           Supine elevated INV/NJ AROM: mid ROM only  np           Supine GA stretch 10"x3 20"x3 standing  10"x3 standing  10"x3 standing  10"x3 standing  10"x3 standing  10"x3 standing  10"x3     Supine plantar fascia stretch 10"x3 20"x3           Seated towel scrunches 1x30 1x60 1x60 1x100 1x100 1x100 1#  1x100 1#  1x100     Seated iso ankle NJ 5"/  1x10 5"/  2x10 5"/  2x10 5"/  3x10         TB PF YTB  3x10 np           ecc DL heel raises  2x10 2x10 3x10 3x10 3x10 3x10 3x10     TB NJ     YTB  2x10 YTB  2x10 YTB  3x10 YTB  3x10                                            Ther Activity                                       Gait Training             AlterG  60% BW  1 5 mph  5 min nv 60-75% BW  1 5-2 mph  10 min 75% BW  1 7 mph  10 min 75% BW  1 7 mph  10 min 75%  BW  2 0 mph  10 min 75%  BW  2 0 mph  10 min                  Modalities

## 2022-03-10 ENCOUNTER — APPOINTMENT (OUTPATIENT)
Dept: PHYSICAL THERAPY | Facility: CLINIC | Age: 32
End: 2022-03-10
Payer: COMMERCIAL

## 2022-03-14 ENCOUNTER — OFFICE VISIT (OUTPATIENT)
Dept: PHYSICAL THERAPY | Facility: CLINIC | Age: 32
End: 2022-03-14
Payer: COMMERCIAL

## 2022-03-14 DIAGNOSIS — Z47.89 ORTHOPEDIC AFTERCARE: Primary | ICD-10-CM

## 2022-03-14 DIAGNOSIS — M79.671 RIGHT FOOT PAIN: ICD-10-CM

## 2022-03-14 PROCEDURE — 97110 THERAPEUTIC EXERCISES: CPT

## 2022-03-14 PROCEDURE — 97112 NEUROMUSCULAR REEDUCATION: CPT

## 2022-03-14 NOTE — PROGRESS NOTES
Daily Note     Today's date: 3/14/2022  Patient name: Alex Ramirez  : 1990  MRN: [id number]  Referring provider: Teena Dickinson MD  Dx:   Encounter Diagnosis     ICD-10-CM    1  Orthopedic aftercare  Z47 89    2  Right foot pain  M79 671                   Subjective: Patient reports no pain currently after not doing much this weekend  Objective: See treatment diary below    Assessment: Patient demonstrated increased tolerance to ankle PF strengthening, continued to be challenged with ankle eversion strengthening 2* pain  Plan: Cont  POC  Precautions:  PMHx: unremarkable  PWB in CAM boot until 22  WBAT in CAM book until 22 then wean off per protocol  Avoid excessive INV ROM  Dx: s/p R foot excision os vesalinium with peroneus brevis tendon transfer to cuboid 22    Manuals 1/7 2/14 2/16 2/23 2/25 3/1 3/4 3/9 3/14    Manual GA stretch  15"x4  10"x3 10"x3        Laser lateral R foot 2000J 3000J  15W 3000J  20W 3000J  20W 3000J  20W 3500J  20W 3000J  20W 3000J  20W 3000J  20W    Laser R plantar fascia 3000J 3000J  15W 3000J  20W 3000J  20W 3000J  20W 3000J  20W 3000J  20W 3000J  20W 3000J  20W                 Neuro Re-Ed             BioDex FT EO    L5  60"x2 L5  60"x1 L3  60"x1 L2  60"x1 L2  60"x1 L2  60"x1    BioDex FT EC    L12  60"x2 L12  60"x1 L10  60"x1 L10  60"x1 L8  60"x1 L8  60"x1    BioDex tandem EO    L=S  60"x1 L=S  60"x1 L6  60"x1 L6  60"x1 L6  60"x1 L6  60"x1                                                        Ther Ex             Supine elevated PF/DF AROM 1x20 np           Supine elevated INV/NJ AROM: mid ROM only  np           Supine GA stretch 10"x3 20"x3 standing  10"x3 standing  10"x3 standing  10"x3 standing  10"x3 standing  10"x3 standing  10"x3 standing  10"x3    Supine plantar fascia stretch 10"x3 20"x3           Seated towel scrunches 1x30 1x60 1x60 1x100 1x100 1x100 1#  1x100 1#  1x100 1#  1x100    Seated iso ankle NJ 5"/  1x10 5"/  2x10 5"/  2x10 5"/  3x10         TB PF YTB  3x10 np           ecc DL heel raises  2x10 2x10 3x10 3x10 3x10 3x10 3x10 3x12    TB NJ     YTB  2x10 YTB  2x10 YTB  3x10 YTB  3x10 YTB  3x10                                           Ther Activity                                       Gait Training             AlterG  60% BW  1 5 mph  5 min nv 60-75% BW  1 5-2 mph  10 min 75% BW  1 7 mph  10 min 75% BW  1 7 mph  10 min 75%  BW  2 0 mph  10 min 75%  BW  2 0 mph  10 min                  Modalities

## 2022-03-15 ENCOUNTER — APPOINTMENT (OUTPATIENT)
Dept: RADIOLOGY | Facility: AMBULARY SURGERY CENTER | Age: 32
End: 2022-03-15
Attending: ORTHOPAEDIC SURGERY
Payer: COMMERCIAL

## 2022-03-15 ENCOUNTER — OFFICE VISIT (OUTPATIENT)
Dept: OBGYN CLINIC | Facility: CLINIC | Age: 32
End: 2022-03-15
Payer: COMMERCIAL

## 2022-03-15 VITALS
HEART RATE: 76 BPM | HEIGHT: 67 IN | DIASTOLIC BLOOD PRESSURE: 86 MMHG | WEIGHT: 178 LBS | SYSTOLIC BLOOD PRESSURE: 126 MMHG | BODY MASS INDEX: 27.94 KG/M2

## 2022-03-15 DIAGNOSIS — S92.355A NONDISPLACED FRACTURE OF FIFTH METATARSAL BONE, LEFT FOOT, INITIAL ENCOUNTER FOR CLOSED FRACTURE: ICD-10-CM

## 2022-03-15 DIAGNOSIS — Q68.8 PAINFUL OS PERONEUM SYNDROME: ICD-10-CM

## 2022-03-15 DIAGNOSIS — M84.374A STRESS FRACTURE OF METATARSAL BONE OF RIGHT FOOT, INITIAL ENCOUNTER: ICD-10-CM

## 2022-03-15 DIAGNOSIS — S92.355A NONDISPLACED FRACTURE OF FIFTH METATARSAL BONE, LEFT FOOT, INITIAL ENCOUNTER FOR CLOSED FRACTURE: Primary | ICD-10-CM

## 2022-03-15 PROCEDURE — 99213 OFFICE O/P EST LOW 20 MIN: CPT | Performed by: ORTHOPAEDIC SURGERY

## 2022-03-15 PROCEDURE — 73630 X-RAY EXAM OF FOOT: CPT

## 2022-03-15 NOTE — PROGRESS NOTES
GORDON Hinds  Attending, Orthopaedic Surgery  [facility] [address] [facility]      ORTHOPAEDIC FOOT AND ANKLE CLINIC VISIT     Assessment:     Encounter Diagnoses   Name Primary?  Nondisplaced fracture of fifth metatarsal bone, left foot, initial encounter for closed fracture Yes    Stress fracture of metatarsal bone of right foot, initial encounter     Painful os peroneum syndrome        Right foot excision os vesalinium with peroneus brevis tendon transfer to cuboid performed on 11/18/21     Plan:   · The patient verbalized understanding of exam findings and treatment plan  We engaged in the shared decision-making process and treatment options were discussed at length with the patient  Surgical and conservative management discussed today along with risks and benefits  · Del is now 4 months s/p excision os vesalinium with peroneus brevis tendon transfer to cuboid and is doing very well with soreness  · Continue formal PT until discharged by therapist to [facility]  · Continue compression stocking for swelling control  · Gradual return to all normal activities as tolerated  Return if symptoms worsen or fail to improve  History of Present Illness:   Chief Complaint: right foot post op     Albert Espinoza is a 27 y o  female who is being seen in follow-up for Right foot excision os vesalinium with peroneus brevis tendon transfer to cuboid performed on 11/18/21  When we last saw she we recommended immediately discontinue use of the cam boot and continue PT and compression stocking  Pain has improved  Residual pain is localized at achilles to lateral ankle and foot with minimal radiating and described as sharp and severe        Pain/symptom timing:  Worse during the day when active  Pain/symptom context:  Worse with activites and work  Pain/symptom modifying factors:  Rest makes better, activities make worse  Pain/symptom associated signs/symptoms: none    Prior treatment · NSAIDsYes   · Injections No   · Bracing/Orthotics Yes    · Physical Therapy Yes     Orthopedic Surgical History:   See Below    Past Medical, Surgical and Social History:  Past Medical History:  has a past medical history of Foot fracture, right, Known health problems: none, Metatarsalgia, and Self-mutilation  Problem List: does not have any pertinent problems on file  Past Surgical History:  has a past surgical history that includes Dental surgery; EGD; pr exc tumor soft tissue leg/ankle subq 3+cm (Right, 12/17/2020); Hernia repair (Right, 12/17/2020); and pr repair flex leg tendon,second,ea (Right, 11/18/2021)  Family History: family history includes Bipolar disorder in her brother; Cancer in her mother; Gallbladder disease in her family; No Known Problems in her father  Social History:  reports that she has never smoked  She has never used smokeless tobacco  She reports current alcohol use  She reports that she does not use drugs  Current Medications: has a current medication list which includes the following prescription(s): celecoxib, eletriptan, escitalopram, ondansetron, amoxicillin, aspirin, and ondansetron  Allergies: is allergic to ciprofloxacin       Review of Systems:  General- denies fever/chills  HEENT- denies hearing loss or sore throat  Eyes- denies eye pain or visual disturbances, denies red eyes  Respiratory- denies cough or SOB  Cardio- denies chest pain or palpitations  GI- denies abdominal pain  Endocrine- denies urinary frequency  Urinary- denies pain with urination  Musculoskeletal- Negative except noted above  Skin- denies rashes or wounds  Neurological- denies dizziness or headache  Psychiatric- denies anxiety or difficulty concentrating    Physical Exam:   /86 (BP Location: Left arm, Patient Position: Sitting, Cuff Size: Adult)   Pulse 76   Ht 5' 7" (1 702 m)   Wt 80 7 kg (178 lb)   BMI 27 88 kg/m²   General/Constitutional: No apparent distress: well-nourished and well developed  Eyes: normal ocular motion  Lymphatic: No appreciable lymphadenopathy  Respiratory: Non-labored breathing  Vascular: No edema, swelling or tenderness, except as noted in detailed exam   Integumentary: No impressive skin lesions present, except as noted in detailed exam   Neuro: No ataxia or tremors noted  Psych: Normal mood and affect, oriented to person, place and time  Appropriate affect  Musculoskeletal: Normal, except as noted in detailed exam and in HPI  Examination    Right    Gait Normal   Musculoskeletal Tender to palpation at achilles to lateral ankle and foot    Skin Normal   Well-healed incisions  Nails Normal    Range of Motion  15 degrees dorsiflexion, 35 degrees plantarflexion  Subtalar motion: normal    Stability Stable    Muscle Strength 5/5 tibialis anterior  5/5 gastrocnemius-soleus  5/5 posterior tibialis  5/5 peroneal/eversion strength  5/5 EHL  5/5 FHL    Neurologic Normal    Sensation  Intact to light touch throughout sural, saphenous, superficial peroneal, deep peroneal and medial/lateral plantar nerve distributions  [place] [address]-Ld 5 07 filament (10g) testing  deferred  Cardiovascular Brisk capillary refill < 2 seconds,intact DP and PT pulses    Special Tests None      Imaging Studies:     3 views of the Right foot were obtained, reviewed and interpreted independently which demonstrate no acute fractures or dislocations  Reviewed by me personally  Adella Hagedorn Lachman, MD  Foot & Ankle Surgery   Department of [address] [place] [address]      I personally performed the service  Adella Hagedorn Lachman, MD    Scribe Attestation    I,:  Natalie Christian MA am acting as a scribe while in the presence of the attending physician :       I,:  Keshawn Abbott MD personally performed the services described in this documentation    as scribed in my presence :

## 2022-03-16 ENCOUNTER — OFFICE VISIT (OUTPATIENT)
Dept: PHYSICAL THERAPY | Facility: CLINIC | Age: 32
End: 2022-03-16
Payer: COMMERCIAL

## 2022-03-16 DIAGNOSIS — Z47.89 ORTHOPEDIC AFTERCARE: Primary | ICD-10-CM

## 2022-03-16 DIAGNOSIS — M79.671 RIGHT FOOT PAIN: ICD-10-CM

## 2022-03-16 PROCEDURE — 97116 GAIT TRAINING THERAPY: CPT

## 2022-03-16 PROCEDURE — 97110 THERAPEUTIC EXERCISES: CPT

## 2022-03-16 PROCEDURE — 97112 NEUROMUSCULAR REEDUCATION: CPT

## 2022-03-16 NOTE — PROGRESS NOTES
Daily Note     Today's date: 3/16/2022  Patient name: Ronald Camacho  : 1990  MRN: [id number]  Referring provider: January Alvares MD  Dx:   Encounter Diagnosis     ICD-10-CM    1  Orthopedic aftercare  Z47 89    2  Right foot pain  M79 671                   Subjective: Patient reports 5-6/10 right foot pain after working yesterday  Pt reports she will be returning to 4 hour days at work with no restrictions as per MD beginning 3/23/22  Objective: See treatment diary below    Assessment: Patient demonstrated decreased tolerance to weightbearing in Alter G 2* pain, increased antalgic gait  Plan: Cont  POC  Precautions:  PMHx: unremarkable  PWB in CAM boot until 22  WBAT in CAM book until 22 then wean off per protocol  Avoid excessive INV ROM  Dx: s/p R foot excision os vesalinium with peroneus brevis tendon transfer to cuboid 22    Manuals 1/7 2/14 2/16 2/23 2/25 3/1 3/4 3/9 3/14 3/16   Manual GA stretch  15"x4  10"x3 10"x3        Laser lateral R foot 2000J 3000J  15W 3000J  20W 3000J  20W 3000J  20W 3500J  20W 3000J  20W 3000J  20W 3000J  20W 3000J  20W   Laser R plantar fascia 3000J 3000J  15W 3000J  20W 3000J  20W 3000J  20W 3000J  20W 3000J  20W 3000J  20W 3000J  20W 3000J  20W                Neuro Re-Ed             BioDex FT EO    L5  60"x2 L5  60"x1 L3  60"x1 L2  60"x1 L2  60"x1 L2  60"x1 L2  60"x1   BioDex FT EC    L12  60"x2 L12  60"x1 L10  60"x1 L10  60"x1 L8  60"x1 L8  60"x1 L7  60"x1   BioDex tandem EO    L=S  60"x1 L=S  60"x1 L6  60"x1 L6  60"x1 L6  60"x1 L6  60"x1 L5  60"x1                                                       Ther Ex             Supine elevated PF/DF AROM 1x20 np           Supine elevated INV/NJ AROM: mid ROM only  np           Supine GA stretch 10"x3 20"x3 standing  10"x3 standing  10"x3 standing  10"x3 standing  10"x3 standing  10"x3 standing  10"x3 standing  10"x3 standing  10"x3   Supine plantar fascia stretch 10"x3 20"x3           Seated towel scrunches 1x30 1x60 1x60 1x100 1x100 1x100 1#  1x100 1#  1x100 1#  1x100 1#  1x100   Seated iso ankle NJ 5"/  1x10 5"/  2x10 5"/  2x10 5"/  3x10         TB PF YTB  3x10 np           ecc DL heel raises  2x10 2x10 3x10 3x10 3x10 3x10 3x10 3x12 3x12   TB NJ     YTB  2x10 YTB  2x10 YTB  3x10 YTB  3x10 YTB  3x10 YTB  3x10                                          Ther Activity                                       Gait Training             AlterG  60% BW  1 5 mph  5 min nv 60-75% BW  1 5-2 mph  10 min 75% BW  1 7 mph  10 min 75% BW  1 7 mph  10 min 75%  BW  2 0 mph  10 min 75%  BW  2 0 mph  10 min  65%  BW  2 0 mph  10 min                Modalities

## 2022-03-21 ENCOUNTER — ANNUAL EXAM (OUTPATIENT)
Dept: OBGYN CLINIC | Facility: CLINIC | Age: 32
End: 2022-03-21

## 2022-03-21 VITALS
HEIGHT: 67 IN | SYSTOLIC BLOOD PRESSURE: 120 MMHG | DIASTOLIC BLOOD PRESSURE: 78 MMHG | BODY MASS INDEX: 29.82 KG/M2 | WEIGHT: 190 LBS

## 2022-03-21 DIAGNOSIS — Z01.419 ENCOUNTER FOR ANNUAL ROUTINE GYNECOLOGICAL EXAMINATION: Primary | ICD-10-CM

## 2022-03-21 DIAGNOSIS — N94.6 DYSMENORRHEA: ICD-10-CM

## 2022-03-21 PROCEDURE — S0610 ANNUAL GYNECOLOGICAL EXAMINA: HCPCS | Performed by: OBSTETRICS & GYNECOLOGY

## 2022-03-21 PROCEDURE — G0476 HPV COMBO ASSAY CA SCREEN: HCPCS | Performed by: OBSTETRICS & GYNECOLOGY

## 2022-03-21 PROCEDURE — G0145 SCR C/V CYTO,THINLAYER,RESCR: HCPCS | Performed by: OBSTETRICS & GYNECOLOGY

## 2022-03-21 RX ORDER — NORGESTIMATE AND ETHINYL ESTRADIOL 7DAYSX3 LO
1 KIT ORAL DAILY
Qty: 28 TABLET | Refills: 4 | Status: SHIPPED | OUTPATIENT
Start: 2022-03-21 | End: 2022-06-13

## 2022-03-21 NOTE — PROGRESS NOTES
Assessment/Plan:  Pap smear done as well as annual   Encouraged self-breast examination as well as calcium supplementation  Discussed the risks and benefits of the HPV vaccine  Patient is interested in  She will check to see if this is covered by her insurance  Discussed treatment options for dysmenorrhea  She would like to start OCPs  She has never been on birth control pills  Risks and benefits reviewed  All questions answered  She will start Ortho-Tri-Cyclen Lo x4 months  She will keep a menstrual diary, return to office in 4 months follow-up or p r n  No problem-specific Assessment & Plan notes found for this encounter  Diagnoses and all orders for this visit:    Encounter for annual routine gynecological examination  -     Liquid-based pap, screening    Dysmenorrhea  -     norgestimate-ethinyl estradiol (ORTHO TRI-CYCLEN LO) 0 18/0 215/0 25 MG-25 MCG per tablet; Take 1 tablet by mouth daily          Subjective:      Patient ID: Melquiades Abdi is a 27 y o  female  HPI     This is a very pleasant 59-year-old female [de-identified] presents as a new patient for annual gyn exam   Her last gyn exam was approximately 5 years ago  Her menstrual cycles are regular every 28 days lasting 5-7 days with no breakthrough bleeding  She does get significant menstrual cramping over the last 1 year  She is using Motrin with slight improvement  She denies any changes in bowel or bladder function  Patient has not been sexually active in 1 year, prior 3 year monogamous relationship  Pap smears have been normal   She did not receive the Gardasil vaccine  Past surgical history significant for right inguinal hernia 12/2020, pathology was consistent with collection of endometriosis  Pelvic CT scan at that time revealed normal ovaries, uterus  Patient employed full-time registered nurse, [employer]       The following portions of the patient's history were reviewed and updated as appropriate: allergies, current medications, past family history, past medical history, past social history, past surgical history and problem list     Review of Systems   Constitutional: Negative for fatigue, fever and unexpected weight change  Respiratory: Negative for cough, chest tightness, shortness of breath and wheezing  Cardiovascular: Negative  Negative for chest pain and palpitations  Gastrointestinal: Negative  Negative for abdominal distention, abdominal pain, blood in stool, constipation, diarrhea, nausea and vomiting  Genitourinary: Negative  Negative for difficulty urinating, dyspareunia, dysuria, flank pain, frequency, genital sores, hematuria, pelvic pain, urgency, vaginal bleeding, vaginal discharge and vaginal pain  Skin: Negative for rash  Objective:      /78   Ht 5' 7" (1 702 m)   Wt 86 2 kg (190 lb)   LMP 03/09/2022   BMI 29 76 kg/m²          Physical Exam  Constitutional:       Appearance: Normal appearance  She is well-developed  Cardiovascular:      Rate and Rhythm: Normal rate and regular rhythm  Pulmonary:      Effort: Pulmonary effort is normal       Breath sounds: Normal breath sounds  Chest:   Breasts:      Right: No inverted nipple, mass, nipple discharge, skin change or tenderness  Left: No inverted nipple, mass, nipple discharge, skin change or tenderness  Abdominal:      General: Bowel sounds are normal  There is no distension  Palpations: Abdomen is soft  Tenderness: There is no abdominal tenderness  There is no guarding or rebound  Genitourinary:     Labia:         Right: No rash, tenderness or lesion  Left: No rash, tenderness or lesion  Vagina: Normal  No signs of injury  No vaginal discharge or tenderness  Cervix: No cervical motion tenderness, discharge, friability, lesion, erythema or cervical bleeding  Uterus: Not enlarged and not tender  Adnexa:         Right: No mass, tenderness or fullness            Left: No mass, tenderness or fullness  Neurological:      Mental Status: She is alert and oriented to person, place, and time     Psychiatric:         Behavior: Behavior normal

## 2022-03-22 ENCOUNTER — OFFICE VISIT (OUTPATIENT)
Dept: PHYSICAL THERAPY | Facility: CLINIC | Age: 32
End: 2022-03-22
Payer: COMMERCIAL

## 2022-03-22 DIAGNOSIS — M79.671 RIGHT FOOT PAIN: ICD-10-CM

## 2022-03-22 DIAGNOSIS — Z47.89 ORTHOPEDIC AFTERCARE: Primary | ICD-10-CM

## 2022-03-22 LAB
HPV HR 12 DNA CVX QL NAA+PROBE: NEGATIVE
HPV16 DNA CVX QL NAA+PROBE: NEGATIVE
HPV18 DNA CVX QL NAA+PROBE: NEGATIVE

## 2022-03-22 PROCEDURE — 97110 THERAPEUTIC EXERCISES: CPT

## 2022-03-22 PROCEDURE — 97112 NEUROMUSCULAR REEDUCATION: CPT

## 2022-03-22 NOTE — PROGRESS NOTES
Daily Note     Today's date: 3/22/2022  Patient name: Carly Santizo  : 1990  MRN: [id number]  Referring provider: Bekah Islas MD  Dx:   Encounter Diagnosis     ICD-10-CM    1  Orthopedic aftercare  Z47 89    2  Right foot pain  M79 671        Start Time: 906  Stop Time: 0950  Total time in clinic (min): 44 minutes    Subjective: Patient reports 3/10 pain prior to therapy with minor lateral swelling  Objective: See treatment diary below    Assessment: Patient was at less weight bearing during the alter g today  However, she had a normalized gait with no pain  Progress as able  Plan: Cont  POC  Precautions:  PMHx: unremarkable  PWB in CAM boot until 22  WBAT in CAM book until 22 then wean off per protocol  Avoid excessive INV ROM  Dx: s/p R foot excision os vesalinium with peroneus brevis tendon transfer to cuboid 22    Manuals 3/22   2/23 2/25 3/1 3/4 3/9 3/14 3/16   Manual GA stretch    10"x3 10"x3        Laser lateral R foot 3000J  20W   3000J  20W 3000J  20W 3500J  20W 3000J  20W 3000J  20W 3000J  20W 3000J  20W   Laser R plantar fascia 3000J  20W   3000J  20W 3000J  20W 3000J  20W 3000J  20W 3000J  20W 3000J  20W 3000J  20W                Neuro Re-Ed             BioDex FT EO L2  60"x2   L5  60"x2 L5  60"x1 L3  60"x1 L2  60"x1 L2  60"x1 L2  60"x1 L2  60"x1   BioDex FT EC L7  60"x1   L12  60"x2 L12  60"x1 L10  60"x1 L10  60"x1 L8  60"x1 L8  60"x1 L7  60"x1   BioDex tandem EO L5  60"x1   L=S  60"x1 L=S  60"x1 L6  60"x1 L6  60"x1 L6  60"x1 L6  60"x1 L5  60"x1                                                       Ther Ex             Supine elevated PF/DF AROM             Supine elevated INV/NJ AROM: mid ROM only             Supine GA stretch standing 10"x3   standing  10"x3 standing  10"x3 standing  10"x3 standing  10"x3 standing  10"x3 standing  10"x3 standing  10"x3   Supine plantar fascia stretch             Seated towel scrunches 1#  1x100   1x100 1x100 1x100 1#  1x100 1#  1x100 1#  1x100 1#  1x100   Seated iso ankle NJ    5"/  3x10         TB PF             ecc DL heel raises 3x12   3x10 3x10 3x10 3x10 3x10 3x12 3x12   TB NJ YTB  3x10    YTB  2x10 YTB  2x10 YTB  3x10 YTB  3x10 YTB  3x10 YTB  3x10                                          Ther Activity                                       Gait Training             AlterG 65%  BW  2 0 mph  10 min   60-75% BW  1 5-2 mph  10 min 75% BW  1 7 mph  10 min 75% BW  1 7 mph  10 min 75%  BW  2 0 mph  10 min 75%  BW  2 0 mph  10 min  65%  BW  2 0 mph  10 min                Modalities

## 2022-03-23 ENCOUNTER — APPOINTMENT (OUTPATIENT)
Dept: PHYSICAL THERAPY | Facility: CLINIC | Age: 32
End: 2022-03-23
Payer: COMMERCIAL

## 2022-03-24 LAB
LAB AP GYN PRIMARY INTERPRETATION: NORMAL
LAB AP LMP: NORMAL
Lab: NORMAL

## 2022-04-11 ENCOUNTER — OFFICE VISIT (OUTPATIENT)
Dept: OBGYN CLINIC | Facility: CLINIC | Age: 32
End: 2022-04-11
Payer: COMMERCIAL

## 2022-04-11 VITALS
HEART RATE: 71 BPM | DIASTOLIC BLOOD PRESSURE: 80 MMHG | BODY MASS INDEX: 29.82 KG/M2 | WEIGHT: 190 LBS | HEIGHT: 67 IN | SYSTOLIC BLOOD PRESSURE: 117 MMHG

## 2022-04-11 DIAGNOSIS — Q68.8 PAINFUL OS PERONEUM SYNDROME: Primary | ICD-10-CM

## 2022-04-11 DIAGNOSIS — M76.71 PERONEAL TENDONITIS, RIGHT: ICD-10-CM

## 2022-04-11 PROCEDURE — 99214 OFFICE O/P EST MOD 30 MIN: CPT | Performed by: ORTHOPAEDIC SURGERY

## 2022-04-11 RX ORDER — METHYLPREDNISOLONE 4 MG/1
TABLET ORAL
Qty: 1 EACH | Refills: 0 | Status: SHIPPED | OUTPATIENT
Start: 2022-04-11 | End: 2022-06-13

## 2022-04-28 DIAGNOSIS — G43.909 MIGRAINE WITHOUT STATUS MIGRAINOSUS, NOT INTRACTABLE, UNSPECIFIED MIGRAINE TYPE: ICD-10-CM

## 2022-04-28 DIAGNOSIS — N94.6 DYSMENORRHEA: ICD-10-CM

## 2022-04-28 DIAGNOSIS — R11.0 NAUSEA: ICD-10-CM

## 2022-04-28 RX ORDER — ONDANSETRON 4 MG/1
4 TABLET, ORALLY DISINTEGRATING ORAL EVERY 6 HOURS PRN
Qty: 20 TABLET | Refills: 0 | Status: SHIPPED | OUTPATIENT
Start: 2022-04-28

## 2022-04-28 RX ORDER — ELETRIPTAN HYDROBROMIDE 20 MG/1
20 TABLET, FILM COATED ORAL ONCE AS NEEDED
Qty: 6 TABLET | Refills: 0 | Status: SHIPPED | OUTPATIENT
Start: 2022-04-28 | End: 2022-06-13 | Stop reason: SDUPTHER

## 2022-04-28 RX ORDER — NORGESTIMATE AND ETHINYL ESTRADIOL 7DAYSX3 LO
1 KIT ORAL DAILY
Qty: 28 TABLET | Refills: 0 | Status: CANCELLED | OUTPATIENT
Start: 2022-04-28

## 2022-04-28 NOTE — TELEPHONE ENCOUNTER
Last Office visit with Provider 1/14/2022 Telemed mercedes/ Sharron Hammond      Next visit with Provider : Ede Abraham

## 2022-06-09 ENCOUNTER — TELEPHONE (OUTPATIENT)
Dept: FAMILY MEDICINE CLINIC | Facility: CLINIC | Age: 32
End: 2022-06-09

## 2022-06-09 ENCOUNTER — TELEMEDICINE (OUTPATIENT)
Dept: FAMILY MEDICINE CLINIC | Facility: CLINIC | Age: 32
End: 2022-06-09
Payer: COMMERCIAL

## 2022-06-09 DIAGNOSIS — U07.1 COVID-19: Primary | ICD-10-CM

## 2022-06-09 LAB — SARS-COV-2 AG UPPER RESP QL IA: ABNORMAL

## 2022-06-09 PROCEDURE — 99213 OFFICE O/P EST LOW 20 MIN: CPT | Performed by: NURSE PRACTITIONER

## 2022-06-09 NOTE — TELEPHONE ENCOUNTER
----- Message from Ken 649 sent at 6/9/2022 10:09 AM EDT -----  Regarding: Covid test 5/30  Pic attached

## 2022-06-09 NOTE — PROGRESS NOTES
COVID-19 Outpatient Progress Note    Assessment/Plan:    Problem List Items Addressed This Visit        Other    COVID-19 - Primary     Patient is on day 12 of symptoms  This is her third infection  continue with prednisone, ventolin and amoxicillin already prescribed to you   Follow up as needed  Continue to monitor for fever and low oxygen                 Disposition:     I have spent 6 minutes directly with the patient  Greater than 50% of this time was spent in counseling/coordination of care regarding: instructions for management and impressions  Encounter provider ADAM Catherine    Provider located at [address] [address]  [phone]    Recent Visits  No visits were found meeting these conditions  Showing recent visits within past 7 days and meeting all other requirements  Today's Visits  Date Type Provider Dept   06/09/22 Telephone Chiara Carlton Pg [facility]   06/09/22 Telemedicine ADAM Elmore Pg [facility]   Showing today's visits and meeting all other requirements  Future Appointments  No visits were found meeting these conditions  Showing future appointments within next 150 days and meeting all other requirements     This virtual check-in was done via [employer] and patient was informed that this is a secure, HIPAA-compliant platform  She agrees to proceed  Patient agrees to participate in a virtual check in via telephone or video visit instead of presenting to the office to address urgent/immediate medical needs  Patient is aware this is a billable service  After connecting through [facility], the patient was identified by name and date of birth  Oralia Marshall was informed that this was a telemedicine visit and that the exam was being conducted confidentially over secure lines  My office door was closed  No one else was in the room   Oralia Marshall acknowledged consent and understanding of privacy and security of the telemedicine visit  I informed the patient that I have reviewed her record in Epic and presented the opportunity for her to ask any questions regarding the visit today  The patient agreed to participate  Verification of patient location:  Patient is located in the following state in which I hold an active license: [place]    Subjective:   Gabby Sadler is a 27 y o  female who has been screened for COVID-19  Symptom change since last report: worsening  Patient's symptoms include fever, nasal congestion, cough and shortness of breath  Patient denies chills, fatigue, malaise, rhinorrhea, sore throat, anosmia, loss of taste, chest tightness, abdominal pain, nausea, vomiting, diarrhea, myalgias and headaches  - Date of symptom onset: 5/29/2022  - Date of positive COVID-19 test: 5/30/2022  Type of test: Home antigen  Home antigen result verified by provider  Will get picture of test uploaded/scanned into patient's chart  COVID-19 vaccination status: Fully vaccinated with booster        Staying home and isolating themselves?: has not      Taking care not to share personal items?: is not      Cleaning all surfaces that are touched often?: is not      Wearing a mask when leaving room?: is not      Patient is a nurse in the ICU and reports that one of physicians prescribed prednisone, ventolin and amoxicillin yesterday  She feels the inhaler is helping  She was sent home yesterday for shortness of breath and reoccurrence of her fever of 101 2  she has been taking ibuprofen 800mg to reduce her fever as well       Lab Results   Component Value Date    SARSCOV2 NOT DETECTED 09/09/2021    [facility] Positive (Patient Reported) (A) 06/09/2022     Past Medical History:   Diagnosis Date    Foot fracture, right     Kidney stone     Known health problems: none     Metatarsalgia     Self-mutilation     cutting      Past Surgical History:   Procedure Laterality Date    DENTAL SURGERY      EGD      HERNIA REPAIR Right 12/17/2020    Procedure: REPAIR HERNIA INGUINAL;  Surgeon: Debi Morfin DO;  [facility];  Service: General    CA EXC TUMOR SOFT TISSUE LEG/ANKLE SUBQ 3+CM Right 12/17/2020    Procedure: EXCISION BIOPSY TISSUE LESION/MASS LOWER EXTREMITY (right inguinal region);   Surgeon: Debi Morfin DO;  [facility];  Service: General    CA REPAIR FLEX LEG TENDON,SECOND,EA Right 11/18/2021    Procedure: Excision of painful os vesalinium with peroneus brevis tendon transfer to cuboid;  Surgeon: Renetta Dowling MD;  [facility];  Service: Orthopedics     Current Outpatient Medications   Medication Sig Dispense Refill    amoxicillin (AMOXIL) 500 mg capsule  (Patient not taking: Reported on 2/9/2022 )      aspirin (ECOTRIN) 325 mg EC tablet Take 1 tablet (325 mg total) by mouth every 12 (twelve) hours (Patient not taking: Reported on 2/9/2022 ) 84 tablet 0    celecoxib (CeleBREX) 100 mg capsule Take 1 capsule (100 mg total) by mouth 2 (two) times a day (Patient not taking: Reported on 3/21/2022 ) 30 capsule 0    eletriptan (RELPAX) 20 MG tablet Take 1 tablet (20 mg total) by mouth once as needed for migraine for up to 1 dose may repeat in 2 hours if necessary 6 tablet 0    escitalopram (Lexapro) 10 mg tablet Take 1 tablet (10 mg total) by mouth daily (Patient not taking: Reported on 3/21/2022 ) 90 tablet 1    methylPREDNISolone 4 MG tablet therapy pack Use as directed on package 1 each 0    norgestimate-ethinyl estradiol (ORTHO TRI-CYCLEN LO) 0 18/0 215/0 25 MG-25 MCG per tablet Take 1 tablet by mouth daily 28 tablet 4    ondansetron (ZOFRAN) 4 mg tablet Take 1 tablet (4 mg total) by mouth every 8 (eight) hours as needed for nausea or vomiting 20 tablet 0    ondansetron (ZOFRAN-ODT) 4 mg disintegrating tablet Take 1 tablet (4 mg total) by mouth every 6 (six) hours as needed for nausea or vomiting 20 tablet 0     No current facility-administered medications for this visit  Allergies   Allergen Reactions    Ciprofloxacin Rash       Review of Systems   Constitutional: Positive for fever  Negative for chills and fatigue  HENT: Positive for congestion  Negative for rhinorrhea and sore throat  Respiratory: Positive for cough and shortness of breath  Negative for chest tightness  Gastrointestinal: Negative for abdominal pain, diarrhea, nausea and vomiting  Musculoskeletal: Negative for myalgias  Neurological: Negative for headaches  Objective: There were no vitals filed for this visit  Physical Exam  Vitals and nursing note reviewed  Constitutional:       General: She is not in acute distress  Appearance: Normal appearance  She is well-developed  She is ill-appearing (mild)  She is not toxic-appearing or diaphoretic  HENT:      Head: Normocephalic and atraumatic  Nose: Nose normal    Eyes:      Extraocular Movements: Extraocular movements intact  Conjunctiva/sclera: Conjunctivae normal       Pupils: Pupils are equal, round, and reactive to light  Pulmonary:      Effort: Pulmonary effort is normal  No respiratory distress  Skin:     General: Skin is warm and dry  Coloration: Skin is not pale  Findings: No erythema  Neurological:      Mental Status: She is alert and oriented to person, place, and time  Psychiatric:         Mood and Affect: Mood normal          Behavior: Behavior normal          Thought Content: Thought content normal          Judgment: Judgment normal          VIRTUAL VISIT DISCLAIMER    Ulisses Dyer verbally agrees to participate in [employer]  Pt is aware that [employer] could be limited without vital signs or the ability to perform a full hands-on physical Elizabeth Lamb understands she or the provider may request at any time to terminate the video visit and request the patient to seek care or treatment in person

## 2022-06-09 NOTE — ASSESSMENT & PLAN NOTE
Patient is on day 12 of symptoms  This is her third infection  continue with prednisone, ventolin and amoxicillin already prescribed to you     Follow up as needed  Continue to monitor for fever and low oxygen

## 2022-06-13 ENCOUNTER — OFFICE VISIT (OUTPATIENT)
Dept: FAMILY MEDICINE CLINIC | Facility: CLINIC | Age: 32
End: 2022-06-13
Payer: COMMERCIAL

## 2022-06-13 ENCOUNTER — APPOINTMENT (OUTPATIENT)
Dept: LAB | Facility: CLINIC | Age: 32
End: 2022-06-13
Payer: COMMERCIAL

## 2022-06-13 ENCOUNTER — HOSPITAL ENCOUNTER (OUTPATIENT)
Dept: RADIOLOGY | Facility: HOSPITAL | Age: 32
Discharge: HOME | End: 2022-06-13
Payer: COMMERCIAL

## 2022-06-13 VITALS
HEART RATE: 81 BPM | HEIGHT: 67 IN | SYSTOLIC BLOOD PRESSURE: 110 MMHG | RESPIRATION RATE: 16 BRPM | WEIGHT: 187.6 LBS | TEMPERATURE: 97.6 F | DIASTOLIC BLOOD PRESSURE: 72 MMHG | BODY MASS INDEX: 29.44 KG/M2

## 2022-06-13 DIAGNOSIS — U07.1 COVID-19: ICD-10-CM

## 2022-06-13 DIAGNOSIS — U07.1 COVID-19: Primary | ICD-10-CM

## 2022-06-13 DIAGNOSIS — G43.909 MIGRAINE WITHOUT STATUS MIGRAINOSUS, NOT INTRACTABLE, UNSPECIFIED MIGRAINE TYPE: ICD-10-CM

## 2022-06-13 DIAGNOSIS — M79.671 PAIN IN RIGHT FOOT: ICD-10-CM

## 2022-06-13 LAB
ALBUMIN SERPL BCP-MCNC: 3.5 G/DL (ref 3.5–5)
ALP SERPL-CCNC: 65 U/L (ref 46–116)
ALT SERPL W P-5'-P-CCNC: 25 U/L (ref 12–78)
ANION GAP SERPL CALCULATED.3IONS-SCNC: 5 MMOL/L (ref 4–13)
AST SERPL W P-5'-P-CCNC: 10 U/L (ref 5–45)
BASOPHILS # BLD AUTO: 0.07 THOUSANDS/ΜL (ref 0–0.1)
BASOPHILS NFR BLD AUTO: 1 % (ref 0–1)
BILIRUB SERPL-MCNC: 0.22 MG/DL (ref 0.2–1)
BUN SERPL-MCNC: 11 MG/DL (ref 5–25)
CALCIUM SERPL-MCNC: 9.1 MG/DL (ref 8.3–10.1)
CHLORIDE SERPL-SCNC: 106 MMOL/L (ref 100–108)
CO2 SERPL-SCNC: 29 MMOL/L (ref 21–32)
CREAT SERPL-MCNC: 0.82 MG/DL (ref 0.6–1.3)
D DIMER PPP FEU-MCNC: 0.58 UG/ML FEU
EOSINOPHIL # BLD AUTO: 0.1 THOUSAND/ΜL (ref 0–0.61)
EOSINOPHIL NFR BLD AUTO: 1 % (ref 0–6)
ERYTHROCYTE [DISTWIDTH] IN BLOOD BY AUTOMATED COUNT: 13 % (ref 11.6–15.1)
GFR SERPL CREATININE-BSD FRML MDRD: 94 ML/MIN/1.73SQ M
GLUCOSE P FAST SERPL-MCNC: 82 MG/DL (ref 65–99)
HCT VFR BLD AUTO: 40.2 % (ref 34.8–46.1)
HGB BLD-MCNC: 12.6 G/DL (ref 11.5–15.4)
IMM GRANULOCYTES # BLD AUTO: 0.07 THOUSAND/UL (ref 0–0.2)
IMM GRANULOCYTES NFR BLD AUTO: 1 % (ref 0–2)
LYMPHOCYTES # BLD AUTO: 4.81 THOUSANDS/ΜL (ref 0.6–4.47)
LYMPHOCYTES NFR BLD AUTO: 42 % (ref 14–44)
MCH RBC QN AUTO: 28.1 PG (ref 26.8–34.3)
MCHC RBC AUTO-ENTMCNC: 31.3 G/DL (ref 31.4–37.4)
MCV RBC AUTO: 90 FL (ref 82–98)
MONOCYTES # BLD AUTO: 0.88 THOUSAND/ΜL (ref 0.17–1.22)
MONOCYTES NFR BLD AUTO: 8 % (ref 4–12)
NEUTROPHILS # BLD AUTO: 5.51 THOUSANDS/ΜL (ref 1.85–7.62)
NEUTS SEG NFR BLD AUTO: 47 % (ref 43–75)
NRBC BLD AUTO-RTO: 0 /100 WBCS
PLATELET # BLD AUTO: 297 THOUSANDS/UL (ref 149–390)
PMV BLD AUTO: 11 FL (ref 8.9–12.7)
POTASSIUM SERPL-SCNC: 4 MMOL/L (ref 3.5–5.3)
PROT SERPL-MCNC: 7 G/DL (ref 6.4–8.2)
RBC # BLD AUTO: 4.48 MILLION/UL (ref 3.81–5.12)
SODIUM SERPL-SCNC: 140 MMOL/L (ref 136–145)
WBC # BLD AUTO: 11.44 THOUSAND/UL (ref 4.31–10.16)

## 2022-06-13 PROCEDURE — 80053 COMPREHEN METABOLIC PANEL: CPT | Performed by: PHYSICIAN ASSISTANT

## 2022-06-13 PROCEDURE — 99214 OFFICE O/P EST MOD 30 MIN: CPT | Performed by: PHYSICIAN ASSISTANT

## 2022-06-13 PROCEDURE — 71046 X-RAY EXAM CHEST 2 VIEWS: CPT

## 2022-06-13 PROCEDURE — 36415 COLL VENOUS BLD VENIPUNCTURE: CPT | Performed by: PHYSICIAN ASSISTANT

## 2022-06-13 PROCEDURE — 85379 FIBRIN DEGRADATION QUANT: CPT

## 2022-06-13 PROCEDURE — 85025 COMPLETE CBC W/AUTO DIFF WBC: CPT | Performed by: PHYSICIAN ASSISTANT

## 2022-06-13 RX ORDER — PREDNISONE 20 MG/1
TABLET ORAL
COMMUNITY
Start: 2022-06-08 | End: 2022-06-13

## 2022-06-13 RX ORDER — ELETRIPTAN HYDROBROMIDE 20 MG/1
20 TABLET, FILM COATED ORAL ONCE AS NEEDED
Qty: 6 TABLET | Refills: 5 | Status: SHIPPED | OUTPATIENT
Start: 2022-06-13

## 2022-06-13 RX ORDER — ONDANSETRON 4 MG/1
4 TABLET, FILM COATED ORAL EVERY 8 HOURS PRN
Qty: 20 TABLET | Refills: 2 | Status: SHIPPED | OUTPATIENT
Start: 2022-06-13

## 2022-06-13 RX ORDER — BUDESONIDE 90 UG/1
1 AEROSOL, POWDER RESPIRATORY (INHALATION) 2 TIMES DAILY
Qty: 1 EACH | Refills: 1 | Status: SHIPPED | OUTPATIENT
Start: 2022-06-13 | End: 2022-06-15

## 2022-06-13 RX ORDER — ALBUTEROL SULFATE 90 UG/1
AEROSOL, METERED RESPIRATORY (INHALATION)
COMMUNITY
Start: 2022-06-08 | End: 2023-01-24

## 2022-06-13 NOTE — PATIENT INSTRUCTIONS
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  She will be switching work locations to a newer facility with different lighting and hopes that will help  For now will refill Relpax 20 mg  If patient continues to use Relpax weekly she should return for a follow up visit to discuss further management  Relevant Medications    eletriptan (RELPAX) 20 MG tablet       Other    Pain in right foot    Relevant Medications    ondansetron (ZOFRAN) 4 mg tablet    COVID-19 - Primary     Patient is on day 15 of symptoms  Last fever 6/9/22  Patient continues with cough, chest tightness, and back pain  Patient had course of amoxicillin and prednisone  She notes she continues to feel worse than the previous two times she had COVID  Will order steroid inhaler to be used twice daily  CXR ordered along with CBC, CMP, and a D-dimer  Continue to monitor symptoms  Treat with over the counter medications for symptom management as needed  Follow up as needed or if symptoms persist or worsen             Relevant Medications    budesonide (Pulmicort Flexhaler) 90 MCG/ACT inhaler    Other Relevant Orders    XR chest pa & lateral    CBC and differential    Comprehensive metabolic panel    D-dimer, quantitative

## 2022-06-13 NOTE — PROGRESS NOTES
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  She will be switching work locations to a newer facility with different lighting and hopes that will help  For now will refill Relpax 20 mg  If patient continues to use Relpax weekly she should return for a follow up visit to discuss further management  Relevant Medications    eletriptan (RELPAX) 20 MG tablet       Other    Pain in right foot    Relevant Medications    ondansetron (ZOFRAN) 4 mg tablet    COVID-19 - Primary     Patient is on day 15 of symptoms  Last fever 6/9/22  Patient continues with cough, chest tightness, and back pain  Patient had course of amoxicillin and prednisone  She notes she continues to feel worse than the previous two times she had COVID  Will order steroid inhaler to be used twice daily  CXR ordered along with CBC, CMP, and a D-dimer  Continue to monitor symptoms  Treat with over the counter medications for symptom management as needed  Follow up as needed or if symptoms persist or worsen  Relevant Medications    budesonide (Pulmicort Flexhaler) 90 MCG/ACT inhaler    Other Relevant Orders    XR chest pa & lateral    CBC and differential    Comprehensive metabolic panel    D-dimer, quantitative                 Diagnoses and all orders for this visit:    COVID-19  -     XR chest pa & lateral; Future  -     CBC and differential  -     Comprehensive metabolic panel  -     D-dimer, quantitative; Future  -     budesonide (Pulmicort Flexhaler) 90 MCG/ACT inhaler; Inhale 1 puff 2 (two) times a day Rinse mouth after use  Migraine without status migrainosus, not intractable, unspecified migraine type  -     eletriptan (RELPAX) 20 MG tablet;  Take 1 tablet (20 mg total) by mouth once as needed for migraine for up to 1 dose may repeat in 2 hours if necessary    Pain in right foot  -     ondansetron (ZOFRAN) 4 mg tablet; Take 1 tablet (4 mg total) by mouth every 8 (eight) hours as needed for nausea or vomiting            Subjective:      Patient ID: Oralia Marshall is a 27 y o  female  CC:    Chief Complaint   Patient presents with    Follow-up     Patient in office for COVID follow up  Pt current sxs are cough, SOB, back and chest pain       HPI:    Patient presents today for follow up of COVID-19 and need for refill on her migraine medication  She states that the Relpax is helping her migraine symptoms  She notes in the past she tried other triptans which she had side effects too  She notes she does not get foggy or feel out of it after taking it and she is able to take it while at work and not be affected  Admits to using the Relpax about once a week for migraines  She associates this due to the lighting in her work place  She does state that she will be changing locations to a newer office with different lighting and hopes this will help  Patient initially had COVID GI symptoms two days prior to her positive home test on 5/30  Now patient notes cough, constant tightness in the chest, back pain starting at the base of her lungs and radiating down to flanks worse on right, and bilateral upper abdominal pain that is sometimes described as dull/ahcy and other times sharp/shooting  She admits to SOB due to the chest tightness  She notes she has a baseline nausea even without COVID symptoms, but states this morning she woke up with palpitations and associated nausea  She states the palpitations went away after a half hour of laying in bed resting  Patient notes she is eating and drinking well  Last fever was on Thursday, 6/9  Denies F/C, sore throat, wheezing, abdominal pain, diarrhea or vomiting         The following portions of the patient's history were reviewed and updated as appropriate: allergies, current medications, past family history, past medical history, past social history, past surgical history and problem list       Review of Systems   Constitutional: Positive for fatigue  Negative for appetite change, chills, diaphoresis and fever  HENT: Negative  Negative for congestion, ear pain, facial swelling, postnasal drip, rhinorrhea, sinus pressure, sinus pain, sore throat, trouble swallowing and voice change  Eyes: Negative  Respiratory: Positive for cough, chest tightness and shortness of breath  Negative for wheezing  Cardiovascular: Positive for palpitations  Negative for chest pain  Gastrointestinal: Positive for nausea  Negative for abdominal pain, diarrhea and vomiting  Endocrine: Negative  Genitourinary: Negative  Musculoskeletal: Negative  Negative for myalgias  Skin: Negative  Allergic/Immunologic: Negative  Neurological: Positive for headaches (h/o migraines)  Hematological: Negative  Psychiatric/Behavioral: Negative  All other systems reviewed and are negative  Data to review:       Objective:    Vitals:    06/13/22 0832   BP: 110/72   BP Location: Right arm   Patient Position: Sitting   Cuff Size: Adult   Pulse: 81   Resp: 16   Temp: 97 6 °F (36 4 °C)   TempSrc: Temporal   Weight: 85 1 kg (187 lb 9 6 oz)   Height: 5' 7" (1 702 m)        Physical Exam  Vitals and nursing note reviewed  Constitutional:       General: She is not in acute distress  Appearance: Normal appearance  She is well-developed  She is not ill-appearing  HENT:      Head: Normocephalic and atraumatic  Right Ear: Hearing, tympanic membrane, ear canal and external ear normal       Left Ear: Hearing, tympanic membrane, ear canal and external ear normal       Nose: Nose normal       Mouth/Throat:      Lips: Pink  Mouth: Mucous membranes are moist       Pharynx: Oropharynx is clear  Uvula midline  Posterior oropharyngeal erythema present  No pharyngeal swelling or oropharyngeal exudate     Eyes: General: Lids are normal          Right eye: No discharge  Left eye: No discharge  Extraocular Movements: Extraocular movements intact  Conjunctiva/sclera: Conjunctivae normal       Pupils: Pupils are equal, round, and reactive to light  Cardiovascular:      Rate and Rhythm: Normal rate and regular rhythm  Heart sounds: No murmur heard  No friction rub  No gallop  Pulmonary:      Effort: Pulmonary effort is normal  No respiratory distress  Breath sounds: Normal breath sounds  No wheezing, rhonchi or rales  Lymphadenopathy:      Cervical: No cervical adenopathy  Skin:     General: Skin is warm and dry  Neurological:      General: No focal deficit present  Mental Status: She is alert  Coordination: Coordination is intact  Psychiatric:         Attention and Perception: Attention normal          Mood and Affect: Mood normal          Speech: Speech normal          Behavior: Behavior normal  Behavior is cooperative  Thought Content: Thought content normal          Judgment: Judgment normal              Depression Screening and Follow-up Plan: Patient was screened for depression during today's encounter  They screened negative with a PHQ-2 score of 0  BMI Counseling: Body mass index is 29 38 kg/m²  The BMI is above normal  Nutrition recommendations include 3-5 servings of fruits/vegetables daily, reducing fast food intake, consuming healthier snacks, decreasing soda and/or juice intake, increasing intake of lean protein and reducing intake of cholesterol  Exercise recommendations include exercising 3-5 times per week

## 2022-06-13 NOTE — PROGRESS NOTES
Assessment and Plan:    Problem List Items Addressed This Visit        Cardiovascular and Mediastinum    Migraine       Other    Pain in right foot                 {Assess/PlanSmartLinks:92296}          Subjective:      Patient ID: Rose Encinas is a 27 y o  female  CC:    Chief Complaint   Patient presents with    Follow-up     Patient in office for COVID follow up  Pt current sxs are cough, SOB, back and chest pain       HPI:    HPI    {Common ambulatory SmartLinks:90773}      Review of Systems   Constitutional: Positive for fatigue  Negative for appetite change, chills, diaphoresis and fever  HENT: Negative for congestion, ear pain, facial swelling, postnasal drip, rhinorrhea, sinus pressure, sinus pain, sore throat, trouble swallowing and voice change  Respiratory: Positive for cough, chest tightness and shortness of breath  Negative for wheezing  Cardiovascular: Negative for chest pain  Gastrointestinal: Negative for abdominal pain, diarrhea, nausea and vomiting  Musculoskeletal: Negative for myalgias  Neurological: Negative for headaches  Data to review:       Objective:    Vitals:    22 0832   BP: 110/72   BP Location: Right arm   Patient Position: Sitting   Cuff Size: Adult   Pulse: 81   Resp: 16   Temp: 97 6 °F (36 4 °C)   TempSrc: Temporal   Weight: 85 1 kg (187 lb 9 6 oz)   Height: 5' 7" (1 702 m)        Physical Exam        BMI Counseling: Body mass index is 29 38 kg/m²   The BMI {VB BMI Counselin}

## 2022-06-13 NOTE — ASSESSMENT & PLAN NOTE
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

## 2022-06-13 NOTE — ASSESSMENT & PLAN NOTE
Patient is on day 15 of symptoms  Last fever 6/9/22  Patient continues with cough, chest tightness, and back pain  Patient had course of amoxicillin and prednisone  She notes she continues to feel worse than the previous two times she had COVID  Will order steroid inhaler to be used twice daily  CXR ordered along with CBC, CMP, and a D-dimer  Continue to monitor symptoms  Treat with over the counter medications for symptom management as needed  Follow up as needed or if symptoms persist or worsen

## 2022-06-14 ENCOUNTER — TELEPHONE (OUTPATIENT)
Dept: FAMILY MEDICINE CLINIC | Facility: CLINIC | Age: 32
End: 2022-06-14

## 2022-06-14 NOTE — TELEPHONE ENCOUNTER
Divya GONZALEZ Case ID: [id number] - Rx #: [id number] Need help? Call us at [phone]    Status Sent to Plan today    Drug Pulmicort Flexhaler 90MCG/ACT aerosol powder   Form [employer] Electronic Prior Authorization Form

## 2022-06-15 ENCOUNTER — TELEPHONE (OUTPATIENT)
Dept: FAMILY MEDICINE CLINIC | Facility: CLINIC | Age: 32
End: 2022-06-15

## 2022-06-15 DIAGNOSIS — U07.1 COVID-19: Primary | ICD-10-CM

## 2022-06-15 RX ORDER — DEXAMETHASONE 4 MG/1
2 TABLET ORAL 2 TIMES DAILY
Qty: 12 G | Refills: 1 | Status: SHIPPED | OUTPATIENT
Start: 2022-06-15 | End: 2022-06-22

## 2022-06-15 NOTE — TELEPHONE ENCOUNTER
KB, Insurance has Denied pts Rx for Pulmicort Flex-Haler, According to insurance the alternatives are Arnuity, Asmanex, Flovent or Q-yumi these are covered without a P  A

## 2022-06-15 NOTE — RESULT ENCOUNTER NOTE
Please let the patient know that her D-dimer is only a few tenths of a point elevated which should not indicate PE and no CT is necessary  Her white count is slightly elevated which would be the case because she is fighting COVID infection  At this point time I am only waiting for chest x-ray  Depending on chest x-ray results we may place her on a another antibiotic such as azithromycin as she has already finished amoxicillin

## 2022-06-17 ENCOUNTER — TELEPHONE (OUTPATIENT)
Dept: FAMILY MEDICINE CLINIC | Facility: CLINIC | Age: 32
End: 2022-06-17

## 2022-06-17 NOTE — TELEPHONE ENCOUNTER
Patient called in and stated that she would like a referral to see a a pulmonary doctors so they can discuss different inhaler treatments for patient, patient would like to see a dr Anup Fox    Please advise thank you

## 2022-06-20 DIAGNOSIS — U07.1 COVID-19: Primary | ICD-10-CM

## 2022-06-22 ENCOUNTER — CONSULT (OUTPATIENT)
Dept: PULMONOLOGY | Facility: CLINIC | Age: 32
End: 2022-06-22
Payer: COMMERCIAL

## 2022-06-22 VITALS
RESPIRATION RATE: 18 BRPM | HEIGHT: 67 IN | BODY MASS INDEX: 28.09 KG/M2 | WEIGHT: 179 LBS | OXYGEN SATURATION: 99 % | HEART RATE: 66 BPM | SYSTOLIC BLOOD PRESSURE: 112 MMHG | DIASTOLIC BLOOD PRESSURE: 70 MMHG | TEMPERATURE: 97.1 F

## 2022-06-22 DIAGNOSIS — U07.1 COVID-19: ICD-10-CM

## 2022-06-22 DIAGNOSIS — J45.991 COUGH VARIANT ASTHMA: ICD-10-CM

## 2022-06-22 DIAGNOSIS — R05.3 POST-COVID CHRONIC COUGH: Primary | ICD-10-CM

## 2022-06-22 DIAGNOSIS — J45.909 UNCOMPLICATED ASTHMA, UNSPECIFIED ASTHMA SEVERITY, UNSPECIFIED WHETHER PERSISTENT: ICD-10-CM

## 2022-06-22 DIAGNOSIS — U09.9 POST-COVID CHRONIC COUGH: Primary | ICD-10-CM

## 2022-06-22 PROCEDURE — 99244 OFF/OP CNSLTJ NEW/EST MOD 40: CPT | Performed by: INTERNAL MEDICINE

## 2022-06-22 RX ORDER — PROMETHAZINE HYDROCHLORIDE AND CODEINE PHOSPHATE 6.25; 1 MG/5ML; MG/5ML
5 SYRUP ORAL EVERY 4 HOURS PRN
Qty: 180 ML | Refills: 1 | Status: SHIPPED | OUTPATIENT
Start: 2022-06-22

## 2022-06-22 RX ORDER — BUDESONIDE AND FORMOTEROL FUMARATE DIHYDRATE 160; 4.5 UG/1; UG/1
2 AEROSOL RESPIRATORY (INHALATION) 2 TIMES DAILY
Qty: 10.2 G | Refills: 3 | Status: SHIPPED | OUTPATIENT
Start: 2022-06-22

## 2022-06-22 RX ORDER — OMEPRAZOLE 20 MG/1
20 CAPSULE, DELAYED RELEASE ORAL 2 TIMES DAILY
Qty: 60 CAPSULE | Refills: 2 | Status: SHIPPED | OUTPATIENT
Start: 2022-06-22

## 2022-06-22 NOTE — ASSESSMENT & PLAN NOTE
· Mostly dry cough as detailed above that has been exacerbated since the COVID infection earlier this month

## 2022-06-22 NOTE — ASSESSMENT & PLAN NOTE
Recent infection in June 9 associated with significant fever and persistent cough  Blood work was significant for mildly elevated D-dimer, likely reactive

## 2022-06-22 NOTE — PROGRESS NOTES
Pulmonary Consultation   Pb Nicholson Gretta Katz 27 y o  female MRN: [id number]  6/22/2022      Assessment and Plan:    1  Post-COVID chronic cough  Assessment & Plan:  · Post viral cough syndrome, persistent, mostly dry associated with shortness of breath with exertion and chest tightness  · Chest x-ray normal  · I would like to rule out interstitial lung disease or post COVID pneumonitis  · Ordered HRCT of the chest  · Obtain PFTs with bronchodilator response  · On examination, mild pharyngeal erythema, start a trial of PPI in case there is acid reflux  · Continue Symbicort and albuterol  · Promethazine-codeine 5 ml as needed    Orders:  -     budesonide-formoterol (Symbicort) 160-4 5 mcg/act inhaler; Inhale 2 puffs 2 (two) times a day Rinse mouth after use  -     promethazine-codeine (PHENERGAN WITH CODEINE) 6 25-10 mg/5 mL syrup; Take 5 mL by mouth every 4 (four) hours as needed for cough  -     CT chest high resolution; Future; Expected date: 06/22/2022  -     omeprazole (PriLOSEC) 20 mg delayed release capsule; Take 1 capsule (20 mg total) by mouth 2 (two) times a day  -     [facility] Allergy Panel, Adult; Future  -     Complete PFT with post bronchodilator; Future    2  COVID-19  Assessment & Plan:  Recent infection in June 9 associated with significant fever and persistent cough  Blood work was significant for mildly elevated D-dimer, likely reactive  Orders:  -     budesonide-formoterol (Symbicort) 160-4 5 mcg/act inhaler; Inhale 2 puffs 2 (two) times a day Rinse mouth after use  -     promethazine-codeine (PHENERGAN WITH CODEINE) 6 25-10 mg/5 mL syrup; Take 5 mL by mouth every 4 (four) hours as needed for cough  -     CT chest high resolution; Future; Expected date: 06/22/2022  -     omeprazole (PriLOSEC) 20 mg delayed release capsule; Take 1 capsule (20 mg total) by mouth 2 (two) times a day  -     [facility] Allergy Panel, Adult; Future  -     Complete PFT with post bronchodilator; Future    3   Cough variant asthma  Assessment & Plan:  · Mostly dry cough as detailed above that has been exacerbated since the COVID infection earlier this month    Orders:  -     budesonide-formoterol (Symbicort) 160-4 5 mcg/act inhaler; Inhale 2 puffs 2 (two) times a day Rinse mouth after use  -     promethazine-codeine (PHENERGAN WITH CODEINE) 6 25-10 mg/5 mL syrup; Take 5 mL by mouth every 4 (four) hours as needed for cough  -     CT chest high resolution; Future; Expected date: 06/22/2022  -     omeprazole (PriLOSEC) 20 mg delayed release capsule; Take 1 capsule (20 mg total) by mouth 2 (two) times a day  -     [facility] Allergy Panel, Adult; Future  -     Complete PFT with post bronchodilator; Future    4  Uncomplicated asthma, unspecified asthma severity, unspecified whether persistent  Assessment & Plan:  · Dyspnea on exertion associated with significant chest tightness as dated as going up stairs  · Would like to get PFTs with bronchodilator response  · Continue Symbicort and albuterol for now  · No significant eosinophilia on CBC  · Obtained [facility] allergy panel and IgE levels  · A trial of PPI as detailed    Orders:  -     budesonide-formoterol (Symbicort) 160-4 5 mcg/act inhaler; Inhale 2 puffs 2 (two) times a day Rinse mouth after use  -     Complete PFT with post bronchodilator; Future        Return in about 3 months (around 9/22/2022)  History of Present Illness   HPI:  Delfina Ashley is a 27 y o  female who is here for evaluation of subacute cough that has started since she had a recent COVID infection on June 9, 2022  Javan Madrid She is telling me she has few days with fever, and started coughing right after that and since then it has been persistent, mostly it is dry cough  It is associated with shortness of breath mainly with exertion, even going up stairs 1 flight would cause feelings of chest tightness  She is not spiking fever anymore, no significant chest pain    She does not think of any specific triggers and does not think that she has significant acid reflux  Review of Systems   Respiratory: Positive for cough, chest tightness and shortness of breath  All other systems reviewed and are negative  Historical Information   Past Medical History:   Diagnosis Date    Foot fracture, right     Kidney stone     Known health problems: none     Metatarsalgia     Self-mutilation     cutting      Past Surgical History:   Procedure Laterality Date    DENTAL SURGERY      EGD      HERNIA REPAIR Right 12/17/2020    Procedure: REPAIR HERNIA INGUINAL;  Surgeon: Margo Vogt DO;  [facility];  Service: General    ME EXC TUMOR SOFT TISSUE LEG/ANKLE SUBQ 3+CM Right 12/17/2020    Procedure: EXCISION BIOPSY TISSUE LESION/MASS LOWER EXTREMITY (right inguinal region);   Surgeon: Margo Vogt DO;  [facility];  Service: General    ME REPAIR FLEX LEG Lennox Mattes Right 11/18/2021    Procedure: Excision of painful os vesalinium with peroneus brevis tendon transfer to cuboid;  Surgeon: Willy Argueta MD;  Location: [facility];  Service: Orthopedics     Family History   Problem Relation Age of Onset    Bipolar disorder Brother     Gallbladder disease Family     No Known Problems Father          Meds/Allergies     Current Outpatient Medications:     amoxicillin (AMOXIL) 500 mg capsule, , Disp: , Rfl:     budesonide-formoterol (Symbicort) 160-4 5 mcg/act inhaler, Inhale 2 puffs 2 (two) times a day Rinse mouth after use , Disp: 10 2 g, Rfl: 3    celecoxib (CeleBREX) 100 mg capsule, Take 1 capsule (100 mg total) by mouth 2 (two) times a day (Patient taking differently: Take 100 mg by mouth as needed), Disp: 30 capsule, Rfl: 0    eletriptan (RELPAX) 20 MG tablet, Take 1 tablet (20 mg total) by mouth once as needed for migraine for up to 1 dose may repeat in 2 hours if necessary, Disp: 6 tablet, Rfl: 5    omeprazole (PriLOSEC) 20 mg delayed release capsule, Take 1 capsule (20 mg total) by mouth 2 (two) times a day, Disp: 60 capsule, Rfl: 2    ondansetron (ZOFRAN) 4 mg tablet, Take 1 tablet (4 mg total) by mouth every 8 (eight) hours as needed for nausea or vomiting, Disp: 20 tablet, Rfl: 2    ondansetron (ZOFRAN-ODT) 4 mg disintegrating tablet, Take 1 tablet (4 mg total) by mouth every 6 (six) hours as needed for nausea or vomiting, Disp: 20 tablet, Rfl: 0    promethazine-codeine (PHENERGAN WITH CODEINE) 6 25-10 mg/5 mL syrup, Take 5 mL by mouth every 4 (four) hours as needed for cough, Disp: 180 mL, Rfl: 1    Ventolin  (90 Base) MCG/ACT inhaler, , Disp: , Rfl:   Allergies   Allergen Reactions    Ciprofloxacin Rash       Vitals: Blood pressure 112/70, pulse 66, temperature (!) 97 1 °F (36 2 °C), temperature source Tympanic, resp  rate 18, height 5' 7" (1 702 m), weight 81 2 kg (179 lb), SpO2 99 %, not currently breastfeeding  Body mass index is 28 04 kg/m²  Oxygen Therapy  SpO2: 99 %  Oxygen Therapy: None (Room air)      Physical Exam  Gen: not in acute distress, Body mass index is 28 04 kg/m²    HEENT: supple, no adenopathy, PERRLA, + 2 tonsils, + pharyngeal erythema  Chest: normal respiratory efforts, clear breath sounds bilaterally  CV: RRR, no murmurs appreciated  Abdomen: Limited exam  Extremities: no edema  Neuro: AAO X3, no focal motor deficit  Skin:  Grossly intact, no rash, no erythema      Labs:  Lab Results   Component Value Date    WBC 11 44 (H) 06/13/2022    HGB 12 6 06/13/2022    HCT 40 2 06/13/2022    MCV 90 06/13/2022     06/13/2022     Lab Results   Component Value Date    GLUCOSE 92 03/28/2015    CALCIUM 9 1 06/13/2022     03/28/2015    K 4 0 06/13/2022    CO2 29 06/13/2022     06/13/2022    BUN 11 06/13/2022    CREATININE 0 82 06/13/2022     No results found for: IGE  Lab Results   Component Value Date    ALT 25 06/13/2022    AST 10 06/13/2022    ALKPHOS 65 06/13/2022    BILITOT 0 41 03/28/2015           Imaging and other studies: I have personally reviewed pertinent films in PACS  CT chest high resolution    (Results Pending)     CXR 6/2022  No cardiopulmonary abnormalities    Pulmonary function testing: ordered           Erika Early MD  [facility]       Portions of the record may have been created with voice recognition software  Occasional wrong word or "sound a like" substitutions may have occurred due to the inherent limitations of voice recognition software  Read the chart carefully and recognize, using context, where substitutions have occurred

## 2022-06-22 NOTE — ASSESSMENT & PLAN NOTE
· Post viral cough syndrome, persistent, mostly dry associated with shortness of breath with exertion and chest tightness  · Chest x-ray normal  · I would like to rule out interstitial lung disease or post COVID pneumonitis  · Ordered HRCT of the chest  · Obtain PFTs with bronchodilator response  · On examination, mild pharyngeal erythema, start a trial of PPI in case there is acid reflux  · Continue Symbicort and albuterol  · Promethazine-codeine 5 ml as needed

## 2022-06-22 NOTE — ASSESSMENT & PLAN NOTE
· Dyspnea on exertion associated with significant chest tightness as dated as going up stairs  · Would like to get PFTs with bronchodilator response  · Continue Symbicort and albuterol for now  · No significant eosinophilia on CBC  · Obtained [place] allergy panel and IgE levels  · A trial of PPI as detailed

## 2022-06-22 NOTE — PATIENT INSTRUCTIONS
Chronic Cough   AMBULATORY CARE:   A chronic cough  is a cough that lasts more than 4 weeks in children or 8 weeks in adults  Signs and symptoms you may also have: Wheezing and shortness of breath    A runny or stuffy nose    Pain or itching in your throat    Red, swollen, watery eyes    A raspy or hoarse voice    Heartburn or a sour taste in your mouth    Call your local emergency number (911 in the [address] [facility]) for any of the following: You cough up blood  You faint when you cough  You have trouble breathing  Call your doctor if:   You have new or worsening symptoms  You have severe pain when you take a deep breath  You become very tired after a coughing fit  You have trouble sleeping because of the coughing  You have questions or concerns about your condition or care  Treatment for a chronic cough  may depend on the cause  You may need medicine to stop your cough, treat allergies or acid reflux, or decrease swelling in your airways  You will need antibiotics if your cough is caused by a respiratory infection  If you take medicine that causes a chronic cough, it may be stopped or changed  You may need speech therapy  A speech therapist can teach you ways to control your cough  Self-care:   Prevent acid reflex  Acid reflux can make your chronic cough worse  Raise your head and upper back when you sleep  Place 2 or more pillows behind your head or sleep in a recliner  Do not lie down for at least 1 hour after you eat  Do not have foods or drinks that increase heartburn  Ask your healthcare provider for other ways to prevent acid reflux  Do not smoke  Encourage your adolescent child not to smoke  Nicotine and other chemicals in cigarettes and cigars can cause lung damage  They can also make your cough worse  Ask your healthcare provider for information if you currently smoke and need help to quit  E-cigarettes or smokeless tobacco still contain nicotine   Talk to your healthcare provider before you use these products  Stay away from secondhand smoke  Do not let people smoke in your car, home, or near your child  Do not stand near someone that is smoking  This includes anyone that is smoking an E-cigarrete  Avoid anything that triggers your allergies or irritates your throat  Allergens and irritants can make your chronic cough worse  Allergens may include dust mites, pollen, pet dander, or mold  Wear a mask if you work around pollutants or irritants  Ask your healthcare provider for more ways to decrease your exposure to allergens or irritants  Drink plenty of liquids as directed  Liquids may help relieve throat discomfort that causes you to cough  Add honey to tea or hot water to help ease your throat pain  Ask how much liquid to drink each day and which liquids are best for you  Follow up with your healthcare provider as directed: You may need to return for more tests  Your healthcare provider may refer to you other specialists  Write down your questions so you remember to ask them during your visits  © Copyright [employer] 2022 Information is for End User's use only and may not be sold, redistributed or otherwise used for commercial purposes  All illustrations and images included in CareNotes® are the copyrighted property of A D A M , Inc  or Jacob [employer]   The above information is an  only  It is not intended as medical advice for individual conditions or treatments  Talk to your doctor, nurse or pharmacist before following any medical regimen to see if it is safe and effective for you

## 2022-07-13 ENCOUNTER — HOSPITAL ENCOUNTER (OUTPATIENT)
Dept: PULMONOLOGY | Facility: HOSPITAL | Age: 32
Discharge: HOME | End: 2022-07-13
Attending: INTERNAL MEDICINE
Payer: COMMERCIAL

## 2022-07-13 DIAGNOSIS — U07.1 COVID-19: ICD-10-CM

## 2022-07-13 DIAGNOSIS — J45.909 UNCOMPLICATED ASTHMA, UNSPECIFIED ASTHMA SEVERITY, UNSPECIFIED WHETHER PERSISTENT: ICD-10-CM

## 2022-07-13 DIAGNOSIS — U09.9 POST-COVID CHRONIC COUGH: ICD-10-CM

## 2022-07-13 DIAGNOSIS — R05.3 POST-COVID CHRONIC COUGH: ICD-10-CM

## 2022-07-13 DIAGNOSIS — J45.991 COUGH VARIANT ASTHMA: ICD-10-CM

## 2022-07-13 PROCEDURE — 94729 DIFFUSING CAPACITY: CPT

## 2022-07-13 PROCEDURE — 94729 DIFFUSING CAPACITY: CPT | Performed by: INTERNAL MEDICINE

## 2022-07-13 PROCEDURE — 94760 N-INVAS EAR/PLS OXIMETRY 1: CPT

## 2022-07-13 PROCEDURE — 94060 EVALUATION OF WHEEZING: CPT | Performed by: INTERNAL MEDICINE

## 2022-07-13 PROCEDURE — 94726 PLETHYSMOGRAPHY LUNG VOLUMES: CPT

## 2022-07-13 PROCEDURE — 94060 EVALUATION OF WHEEZING: CPT

## 2022-07-13 PROCEDURE — 94726 PLETHYSMOGRAPHY LUNG VOLUMES: CPT | Performed by: INTERNAL MEDICINE

## 2022-07-13 RX ORDER — ALBUTEROL SULFATE 2.5 MG/3ML
SOLUTION RESPIRATORY (INHALATION)
Status: COMPLETED
Start: 2022-07-13 | End: 2022-07-13

## 2022-07-13 RX ORDER — ALBUTEROL SULFATE 2.5 MG/3ML
2.5 SOLUTION RESPIRATORY (INHALATION) ONCE
Status: COMPLETED | OUTPATIENT
Start: 2022-07-13 | End: 2022-07-13

## 2022-07-13 RX ADMIN — ALBUTEROL SULFATE 2.5 MG: 2.5 SOLUTION RESPIRATORY (INHALATION) at 07:34

## 2022-08-21 DIAGNOSIS — U09.9 POST-COVID CHRONIC COUGH: ICD-10-CM

## 2022-08-21 DIAGNOSIS — R05.3 POST-COVID CHRONIC COUGH: ICD-10-CM

## 2022-08-21 DIAGNOSIS — J45.991 COUGH VARIANT ASTHMA: ICD-10-CM

## 2022-08-21 DIAGNOSIS — U07.1 COVID-19: ICD-10-CM

## 2022-08-21 RX ORDER — OMEPRAZOLE 20 MG/1
20 CAPSULE, DELAYED RELEASE ORAL 2 TIMES DAILY
Qty: 60 CAPSULE | Refills: 0 | Status: CANCELLED | OUTPATIENT
Start: 2022-08-21

## 2022-09-14 ENCOUNTER — APPOINTMENT (OUTPATIENT)
Dept: LAB | Facility: CLINIC | Age: 32
End: 2022-09-14
Payer: COMMERCIAL

## 2022-09-14 DIAGNOSIS — Z00.00 ENCOUNTER FOR GENERAL HEALTH EXAMINATION: ICD-10-CM

## 2022-09-14 LAB
CHOLEST SERPL-MCNC: 166 MG/DL
HDLC SERPL-MCNC: 64 MG/DL
LDLC SERPL CALC-MCNC: 95 MG/DL (ref 0–100)
NONHDLC SERPL-MCNC: 102 MG/DL
TRIGL SERPL-MCNC: 37 MG/DL

## 2022-09-14 PROCEDURE — 36415 COLL VENOUS BLD VENIPUNCTURE: CPT

## 2022-09-14 PROCEDURE — 80061 LIPID PANEL: CPT

## 2022-09-14 PROCEDURE — 83036 HEMOGLOBIN GLYCOSYLATED A1C: CPT

## 2022-09-15 LAB
EST. AVERAGE GLUCOSE BLD GHB EST-MCNC: 105 MG/DL
HBA1C MFR BLD: 5.3 %

## 2022-10-12 ENCOUNTER — TELEPHONE (OUTPATIENT)
Dept: PULMONOLOGY | Facility: CLINIC | Age: 32
End: 2022-10-12

## 2022-10-12 ENCOUNTER — APPOINTMENT (OUTPATIENT)
Dept: RADIOLOGY | Facility: AMBULARY SURGERY CENTER | Age: 32
End: 2022-10-12
Attending: ORTHOPAEDIC SURGERY
Payer: COMMERCIAL

## 2022-10-12 ENCOUNTER — OFFICE VISIT (OUTPATIENT)
Dept: OBGYN CLINIC | Facility: CLINIC | Age: 32
End: 2022-10-12
Payer: COMMERCIAL

## 2022-10-12 VITALS — WEIGHT: 179 LBS | BODY MASS INDEX: 28.09 KG/M2 | HEIGHT: 67 IN

## 2022-10-12 DIAGNOSIS — R29.898 ANKLE WEAKNESS: ICD-10-CM

## 2022-10-12 DIAGNOSIS — M79.672 PAIN IN LEFT FOOT: ICD-10-CM

## 2022-10-12 DIAGNOSIS — M79.671 PAIN IN RIGHT FOOT: Primary | ICD-10-CM

## 2022-10-12 DIAGNOSIS — S93.601A SPRAIN OF RIGHT FOOT, INITIAL ENCOUNTER: ICD-10-CM

## 2022-10-12 DIAGNOSIS — M79.671 PAIN IN RIGHT FOOT: ICD-10-CM

## 2022-10-12 PROCEDURE — 73630 X-RAY EXAM OF FOOT: CPT

## 2022-10-12 PROCEDURE — 99213 OFFICE O/P EST LOW 20 MIN: CPT | Performed by: ORTHOPAEDIC SURGERY

## 2022-10-12 PROCEDURE — 73610 X-RAY EXAM OF ANKLE: CPT

## 2022-10-12 NOTE — TELEPHONE ENCOUNTER
LM for patient to give a call back to set up a follow up Appt in [place] with Dr Donavan Barnett

## 2022-10-12 NOTE — PROGRESS NOTES
GORDON English  Attending, Orthopaedic Surgery  [facility] [address] [facility]      ORTHOPAEDIC FOOT AND ANKLE CLINIC VISIT     Assessment:     Encounter Diagnoses   Name Primary? • Pain in right foot Yes   • Sprain of right foot, initial encounter    • Ankle weakness             Plan:   · The patient verbalized understanding of exam findings and treatment plan  We engaged in the shared decision-making process and treatment options were discussed at length with the patient  Surgical and conservative management discussed today along with risks and benefits  · Her Xrays demonstrate no fracture of her right foot or ankle from her acute injury  · She was instructed to take OTC meds, rest, ice and elevate the foot and ankle as needed for pain relief  Continue to monitor symptoms as they are improving  · She does have right ankle weakness when compared to the contralateral side  She was provided with a prescription to formal physical therapy/HEP  Return in about 6 weeks (around 11/23/2022)  History of Present Illness:   Chief Complaint:   Chief Complaint   Patient presents with   • Right Ankle - Pain   • Right Foot - Pain     Dereje Son is a 34 y o  female who is being seen in follow-up for Right foot and ankle pain  Patient reports that about 5 days ago she had a twisting injury to her right foot and ankle  She has a history of an excision of os vesalinium with peroneus brevis tendon transfer to cuboid on 11/18/21  When we last saw she we recommended return to ADL's  Pain is localized at lateral foot and ankle with minimal radiating and described as sharp and severe        Pain/symptom timing:  Worse during the day when active  Pain/symptom context:  Worse with activites and work  Pain/symptom modifying factors:  Rest makes better, activities make worse  Pain/symptom associated signs/symptoms: none    Prior treatment   · NSAIDsYes   · Injections No   · Bracing/Orthotics Yes · Physical Therapy No, not for acute injury     Orthopedic Surgical History:   See below    Past Medical, Surgical and Social History:  Past Medical History:  has a past medical history of Foot fracture, right, Kidney stone, Known health problems: none, Metatarsalgia, and Self-mutilation  Problem List: does not have any pertinent problems on file  Past Surgical History:  has a past surgical history that includes Dental surgery; EGD; pr exc tumor soft tissue leg/ankle subq 3+cm (Right, 12/17/2020); Hernia repair (Right, 12/17/2020); and pr repair flex leg tendon,second,ea (Right, 11/18/2021)  Family History: family history includes Bipolar disorder in her brother; Gallbladder disease in her family; No Known Problems in her father  Social History:  reports that she has never smoked  She has never used smokeless tobacco  She reports current alcohol use  She reports that she does not use drugs  Current Medications: has a current medication list which includes the following prescription(s): budesonide-formoterol, celecoxib, eletriptan, omeprazole, ondansetron, ondansetron, ventolin hfa, amoxicillin, and promethazine-codeine  Allergies: is allergic to ciprofloxacin  Review of Systems:  General- denies fever/chills  HEENT- denies hearing loss or sore throat  Eyes- denies eye pain or visual disturbances, denies red eyes  Respiratory- denies cough or SOB  Cardio- denies chest pain or palpitations  GI- denies abdominal pain  Endocrine- denies urinary frequency  Urinary- denies pain with urination  Musculoskeletal- Negative except noted above  Skin- denies rashes or wounds  Neurological- denies dizziness or headache  Psychiatric- denies anxiety or difficulty concentrating    Physical Exam:   Ht 5' 7" (1 702 m)   Wt 81 2 kg (179 lb)   BMI 28 04 kg/m²   General/Constitutional: No apparent distress: well-nourished and well developed    Eyes: normal ocular motion  Lymphatic: No appreciable lymphadenopathy  Respiratory: Non-labored breathing  Vascular: No edema, swelling or tenderness, except as noted in detailed exam   Integumentary: No impressive skin lesions present, except as noted in detailed exam   Neuro: No ataxia or tremors noted  Psych: Normal mood and affect, oriented to person, place and time  Appropriate affect  Musculoskeletal: Normal, except as noted in detailed exam and in HPI  Examination    Right    Gait Normal   Musculoskeletal Tender to palpation at 1st metatarsal and lateral foot    Skin Normal   Well-healed incisions  Nails Normal    Range of Motion  20 degrees dorsiflexion, 40 degrees plantarflexion  Subtalar motion: normal    Stability Stable    Muscle Strength 4/5 tibialis anterior  5/5 gastrocnemius-soleus  5/5 posterior tibialis  4/5 peroneal/eversion strength  5/5 EHL  5/5 FHL    Neurologic Normal    Sensation Intact to light touch throughout sural, saphenous, superficial peroneal, deep peroneal and medial/lateral plantar nerve distributions  [place] 07 filament (10g) testing deferred  Cardiovascular Brisk capillary refill < 2 seconds,intact DP and PT pulses    Special Tests None      Imaging Studies:   3 views of the Right ankle were obtained, reviewed and interpreted independently which demonstrate no acute osseous abnormalities or degenerative changes  Reviewed by me personally  3 views of the Right foot were obtained, reviewed and interpreted independently which demonstrate no acute osseous abnormalities or degenerative changes  Robin Quaker Lachman, MD  Foot & Ankle Surgery   Department of [address] [place] [address]      I personally performed the service  Robin Quaker Lachman, MD    Scribe Attestation    I,:  Daphney Blackburn am acting as a scribe while in the presence of the attending physician :       I,:  Samina Gonzalez MD personally performed the services described in this documentation    as scribed in my presence :

## 2022-11-23 DIAGNOSIS — R11.0 NAUSEA: ICD-10-CM

## 2022-11-23 DIAGNOSIS — G43.909 MIGRAINE WITHOUT STATUS MIGRAINOSUS, NOT INTRACTABLE, UNSPECIFIED MIGRAINE TYPE: ICD-10-CM

## 2022-11-23 RX ORDER — ELETRIPTAN HYDROBROMIDE 20 MG/1
20 TABLET, FILM COATED ORAL ONCE AS NEEDED
Qty: 6 TABLET | Refills: 0 | Status: SHIPPED | OUTPATIENT
Start: 2022-11-23

## 2022-11-23 RX ORDER — ONDANSETRON 4 MG/1
4 TABLET, ORALLY DISINTEGRATING ORAL EVERY 6 HOURS PRN
Qty: 20 TABLET | Refills: 0 | Status: SHIPPED | OUTPATIENT
Start: 2022-11-23

## 2022-12-01 ENCOUNTER — APPOINTMENT (OUTPATIENT)
Dept: RADIOLOGY | Age: 32
End: 2022-12-01
Attending: PHYSICIAN ASSISTANT

## 2022-12-01 ENCOUNTER — APPOINTMENT (OUTPATIENT)
Dept: URGENT CARE | Age: 32
End: 2022-12-01

## 2022-12-01 DIAGNOSIS — T14.90XA INJURY: Primary | ICD-10-CM

## 2022-12-01 DIAGNOSIS — T14.90XA INJURY: ICD-10-CM

## 2022-12-06 ENCOUNTER — OFFICE VISIT (OUTPATIENT)
Dept: OBGYN CLINIC | Facility: CLINIC | Age: 32
End: 2022-12-06

## 2022-12-06 ENCOUNTER — TELEPHONE (OUTPATIENT)
Dept: OBGYN CLINIC | Facility: HOSPITAL | Age: 32
End: 2022-12-06

## 2022-12-06 VITALS
HEART RATE: 71 BPM | BODY MASS INDEX: 28.09 KG/M2 | DIASTOLIC BLOOD PRESSURE: 85 MMHG | SYSTOLIC BLOOD PRESSURE: 127 MMHG | HEIGHT: 67 IN | WEIGHT: 179 LBS

## 2022-12-06 DIAGNOSIS — S93.491A SPRAIN OF ANTERIOR TALOFIBULAR LIGAMENT OF RIGHT ANKLE, INITIAL ENCOUNTER: Primary | ICD-10-CM

## 2022-12-06 DIAGNOSIS — R29.898 ANKLE WEAKNESS: ICD-10-CM

## 2022-12-06 NOTE — PROGRESS NOTES
GORDON Botello  Attending, Orthopaedic Surgery  [facility] [address] [address] [facility]      ORTHOPAEDIC FOOT AND ANKLE CLINIC VISIT     Assessment:     Encounter Diagnoses   Name Primary? • Sprain of anterior talofibular ligament of right ankle, initial encounter Yes   • Ankle weakness             Plan:   · The patient verbalized understanding of exam findings and treatment plan  We engaged in the shared decision-making process and treatment options were discussed at length with the patient  Surgical and conservative management discussed today along with risks and benefits  · Explained to the patient that her x rays reveal an avulsion of the talus at the ATFL insertion  Her anterior drawer test is negative  She has suffered an acute ankle sprain of her right ankle after recent work injury  · She was instructed to begin PT/HEP immediately and wean from her boot  Proper instructions were provided for the patient today  · Ice, elevation and NSAIDs prn for pain relief  Return to clinic in 7 weeks  History of Present Illness:   Chief Complaint:   Chief Complaint   Patient presents with   • Right Foot - Follow-up     Timothy Waldron is a 34 y o  female who is being seen in follow-up for Right ankle pain  When we last saw she we recommended PT  Patient reports a new injury about 1 week ago after a fall down stairs while at work at the [employer]  Pain is localized at ATFL with minimal radiating and described as sharp and severe        Pain/symptom timing:  Worse during the day when active  Pain/symptom context:  Worse with activites and work  Pain/symptom modifying factors:  Rest makes better, activities make worse  Pain/symptom associated signs/symptoms: none    Prior treatment   · NSAIDsYes   · Injections No   · Bracing/Orthotics No    · Physical Therapy Yes     Orthopedic Surgical History:   See below    Past Medical, Surgical and Social History:  Past Medical History:  has a past medical history of Foot fracture, right, Kidney stone, Known health problems: none, Metatarsalgia, and Self-mutilation  Problem List: does not have any pertinent problems on file  Past Surgical History:  has a past surgical history that includes Dental surgery; EGD; pr exc tumor soft tissue leg/ankle subq 3+cm (Right, 12/17/2020); Hernia repair (Right, 12/17/2020); and pr repair flex leg tendon,second,ea (Right, 11/18/2021)  Family History: family history includes Bipolar disorder in her brother; Gallbladder disease in her family; No Known Problems in her father  Social History:  reports that she has never smoked  She has never used smokeless tobacco  She reports current alcohol use  She reports that she does not use drugs  Current Medications: has a current medication list which includes the following prescription(s): budesonide-formoterol, celecoxib, eletriptan, omeprazole, ondansetron, ondansetron, ventolin hfa, amoxicillin, and promethazine-codeine  Allergies: is allergic to ciprofloxacin  Review of Systems:  General- denies fever/chills  HEENT- denies hearing loss or sore throat  Eyes- denies eye pain or visual disturbances, denies red eyes  Respiratory- denies cough or SOB  Cardio- denies chest pain or palpitations  GI- denies abdominal pain  Endocrine- denies urinary frequency  Urinary- denies pain with urination  Musculoskeletal- Negative except noted above  Skin- denies rashes or wounds  Neurological- denies dizziness or headache  Psychiatric- denies anxiety or difficulty concentrating    Physical Exam:   /85 (BP Location: Right arm, Patient Position: Sitting, Cuff Size: Adult)   Pulse 71   Ht 5' 7" (1 702 m)   Wt 81 2 kg (179 lb)   BMI 28 04 kg/m²   General/Constitutional: No apparent distress: well-nourished and well developed    Eyes: normal ocular motion  Lymphatic: No appreciable lymphadenopathy  Respiratory: Non-labored breathing  Vascular: No edema, swelling or tenderness, except as noted in detailed exam   Integumentary: No impressive skin lesions present, except as noted in detailed exam   Neuro: No ataxia or tremors noted  Psych: Normal mood and affect, oriented to person, place and time  Appropriate affect  Musculoskeletal: Normal, except as noted in detailed exam and in HPI  Examination    Right    Gait Normal   Musculoskeletal TTP ATFL and lateral ankle    Skin Normal       Nails Normal    Range of Motion  20 degrees dorsiflexion, 40 degrees plantarflexion  Subtalar motion: normal    Stability Stable    Muscle Strength 5/5 tibialis anterior  5/5 gastrocnemius-soleus  5/5 posterior tibialis  4/5 peroneal/eversion strength  5/5 EHL  5/5 FHL    Neurologic Normal    Sensation Intact to light touch throughout sural, saphenous, superficial peroneal, deep peroneal and medial/lateral plantar nerve distributions  Ju[place]-Ld 5 07 filament (10g) testing  deferred  Cardiovascular Brisk capillary refill < 2 seconds,intact DP and PT pulses    Special Tests Negative anterior drawer      Imaging Studies:   3 views of the right ankle were performed on 12/1/22 and reveal chronic avulsion of the talus  No other acute osseous abnormalities  Scribe Attestation    I,:  Halley Dickens am acting as a scribe while in the presence of the attending physician :       I,:  Darren Veloz MD personally performed the services described in this documentation    as scribed in my presence :                 Jay Rower Lachman, MD  Foot & Ankle Surgery   Department [address]      I personally performed the service  Jay Rower Lachman, MD

## 2022-12-06 NOTE — TELEPHONE ENCOUNTER
Caller: Patient    Doctor: Lachman    Reason for call: Patient called to request that her note for work include the reason she is unable to work. Her employer has asked for a specific reason.  Please put note in [employer] when completed    Call back#: [phone]

## 2022-12-06 NOTE — PROGRESS NOTES
GORDON Parsons  Attending, Orthopaedic Surgery  [facility] [address] [address] [facility]      ORTHOPAEDIC FOOT AND ANKLE CLINIC VISIT     Assessment:     Encounter Diagnoses   Name Primary? • Sprain of right foot, initial encounter Yes   • Ankle weakness             Plan:   · The patient verbalized understanding of exam findings and treatment plan  We engaged in the shared decision-making process and treatment options were discussed at length with the patient  Surgical and conservative management discussed today along with risks and benefits  · She is a 6 week follow up for ankle weakness  ·   Return if symptoms worsen or fail to improve  History of Present Illness:   Chief Complaint:   Chief Complaint   Patient presents with   • Right Foot - Follow-up     Óscar Quezada is a 34 y o  female who is being seen in follow-up for Right foot & ankle pain  When we last saw she we recommended PT  Pain has  improved  Residual pain is localized at ATFL with minimal radiating and described as sharp and severe  Pain/symptom timing:  Worse during the day when active  Pain/symptom context:  Worse with activites and work  Pain/symptom modifying factors:  Rest makes better, activities make worse  Pain/symptom associated signs/symptoms: none    Prior treatment   · NSAIDsYes   · Injections No   · Bracing/Orthotics No    · Physical Therapy Yes     Orthopedic Surgical History:   See below    Past Medical, Surgical and Social History:  Past Medical History:  has a past medical history of Foot fracture, right, Kidney stone, Known health problems: none, Metatarsalgia, and Self-mutilation  Problem List: does not have any pertinent problems on file  Past Surgical History:  has a past surgical history that includes Dental surgery; EGD; pr exc tumor soft tissue leg/ankle subq 3+cm (Right, 12/17/2020); Hernia repair (Right, 12/17/2020); and pr repair flex leg tendon,second,ea (Right, 11/18/2021)    Family History: family history includes Bipolar disorder in her brother; Gallbladder disease in her family; No Known Problems in her father  Social History:  reports that she has never smoked  She has never used smokeless tobacco  She reports current alcohol use  She reports that she does not use drugs  Current Medications: has a current medication list which includes the following prescription(s): amoxicillin, budesonide-formoterol, celecoxib, eletriptan, omeprazole, ondansetron, ondansetron, promethazine-codeine, and ventolin hfa  Allergies: is allergic to ciprofloxacin  Review of Systems:  General- denies fever/chills  HEENT- denies hearing loss or sore throat  Eyes- denies eye pain or visual disturbances, denies red eyes  Respiratory- denies cough or SOB  Cardio- denies chest pain or palpitations  GI- denies abdominal pain  Endocrine- denies urinary frequency  Urinary- denies pain with urination  Musculoskeletal- Negative except noted above  Skin- denies rashes or wounds  Neurological- denies dizziness or headache  Psychiatric- denies anxiety or difficulty concentrating    Physical Exam:   There were no vitals taken for this visit  General/Constitutional: No apparent distress: well-nourished and well developed  Eyes: normal ocular motion  Lymphatic: No appreciable lymphadenopathy  Respiratory: Non-labored breathing  Vascular: No edema, swelling or tenderness, except as noted in detailed exam   Integumentary: No impressive skin lesions present, except as noted in detailed exam   Neuro: No ataxia or tremors noted  Psych: Normal mood and affect, oriented to person, place and time  Appropriate affect  Musculoskeletal: Normal, except as noted in detailed exam and in HPI      Examination    Right    Gait Normal   Musculoskeletal NTTP    Skin Normal       Nails Normal    Range of Motion  20 degrees dorsiflexion, 40 degrees plantarflexion  Subtalar motion: normal    Stability Stable    Muscle Strength 5/5 tibialis anterior  5/5 gastrocnemius-soleus  5/5 posterior tibialis  5/5 peroneal/eversion strength  5/5 EHL  5/5 FHL    Neurologic Normal    Sensation  Intact to light touch throughout sural, saphenous, superficial peroneal, deep peroneal and medial/lateral plantar nerve distributions  [place]-Ld 5 07 filament (10g) testing  deferred  Cardiovascular Brisk capillary refill < 2 seconds,intact DP and PT pulses    Special Tests None      Imaging Studies:   No new imaging    Scribe Attestation    I,:  Eduarda Solis PA-C am acting as a scribe while in the presence of the attending physician :       I,:  Joon Arita MD personally performed the services described in this documentation    as scribed in my presence :                 Sherral Mires Lachman, MD  Foot & Ankle Surgery   Department of [address] [place] [address]      I personally performed the service  Sherral Mires Lachman, MD

## 2022-12-06 NOTE — LETTER
December 6, 2022     Patient: Melany Montalvo  YOB: 1990  Date of Visit: 12/6/2022      To Whom it May Concern:    Melany Montalvo is under my professional care  Clark Susana was seen in my office on 12/6/2022  Clarkoscar Meza is to work from home until her next follow up visit in 7 weeks  If you have any questions or concerns, please don't hesitate to call           Sincerely,          Yael Flowers MD        CC: No Recipients

## 2022-12-06 NOTE — PATIENT INSTRUCTIONS
You may now begin weaning your boot and transitioning to a sneaker  It is important to do this gradually to avoid aggravating the healing process  Today, you may come out of the boot into a sneaker for 2 hours  2  Tomorrow, you may come out of the boot into a sneaker for 4 hours,  3  The next day, you may come out of the boot into a sneaker for 6 hours  4  Continue this (adding 2 hours per day) as you tolerate  For example, if you do 6 hours out of the boot into a sneaker and your foot swells more than usual at night and it is difficult to control the discomfort, do not advance to 8 hours the next day, stay at 6 hours until you are able to tolerate it  Elevation, Ice and tylenol and staying off of it at night will be important to aide in this transition out of the boot  Swelling and soreness are normal as you begin to do more with the injured leg  Elevation, ice, NSAIDs, Rest     PT to begin immediately  You have sprained your ankle  Over the next 4 weeks it is important you follow these instructions;     Lateral Ankle Sprain Protocol - [facility]opaedic Foot and Ankle  Acute Phase: Days 1-3  Goals: Decrease pain and swelling, protect from further injury  Pain and swelling management (RICE)  Protection of injured ligaments (activity modification, supportive sneakers, occasionally a boot is necessary for a week or two at most)  Gait-WBAT  Sub-Acute Phase: 2-4 days to 2 weeks  Goals: Decrease/eliminate pain, increase ROM, decrease swelling, increase strength  Continue pain and swelling management  Subtalar and talocrurcal joint mobilizations  ROM with pain-free range: DF/PF/EV/IV AROM, calf stretching  Isometric strengthening  Rehabilitative Phase: 2-6 weeks  Goals: Regain ROM and strength, increase endurance and proprioception  Continue joint mobilizations and stretching  Progress to pain-free concentric and eccentric strengthening exercises (both open chain and closed chain)  Proprioception exercises (balance board, BAPS board, single leg stance etc )  Gait training-promote equal weight beraing and weaning of assistive devices  Endurance activities (stationary biking, swimming, walking, etc )  Functional Phase: 6 weeks  Goals: Return to full activity and function  Continue strengthening exercises  Coordination and agility training-depends on patient's prior level of function, recreational activities, and goals         Treating your Sprained Ankle  Treating your sprained ankle properly may prevent chronic pain and instability  For a Grade I sprain, follow the R I C E  guidelines:    Rest your ankle by not walking on it  Limit weight bearing  Use crutches if necessary; if there is no fracture you are safe to put some weight on the leg  An ankle brace often helps control swelling and adds stability while the ligaments are healing  Ice it to keep down the swelling  Don't put ice directly on the skin (use a thin piece of cloth such as a pillow case between the ice bag and the skin) and don't ice more than 20 minutes at a time to avoid frost bite  Compression can help control swelling as well as immobilize and support your injury  Elevate the foot by reclining and propping it up above the waist or heart as needed  Swelling usually goes down with a few days  For a Grade II sprain, follow the R I C E  guidelines and allow more time for healing  A doctor may immobilize or splint your sprained ankle  A Grade III sprain puts you at risk for permanent ankle instability  Rarely, surgery may be needed to repair the damage, especially in competitive athletes  For severe ankle sprains, your doctor may also consider treating you with a short leg cast for two to three weeks or a walking boot  People who sprain their ankle repeatedly may also need surgical repair to tighten their ligaments  Rehabilitating your Sprained Ankle  Every ligament injury needs rehabilitation   Otherwise, your sprained ankle might not heal completely and you might re-injure it  All ankle sprains, from mild to severe, require three phases of recovery:    Phase I includes resting, protecting and reducing swelling of your injured ankle  Phase II includes restoring your ankle's flexibility, range of motion and strength  Phase III includes gradually returning to straight-ahead activity and doing maintenance exercises, followed later by more cutting sports such as tennis, basketball or football  Once you can stand on your ankle again, your doctor will prescribe exercise routines to strengthen your muscles and ligaments and increase your flexibility, balance and coordination  Later, you may walk, jog and run figure eights with your ankle taped or in a supportive ankle brace  It's important to complete the rehabilitation program because it makes it less likely that you'll hurt the same ankle again  If you don't complete rehabilitation, you could suffer chronic pain, instability and arthritis in your ankle  If your ankle still hurts, it could mean that the sprained ligament has not healed right, or that some other injury also happened  To prevent future sprained ankles, pay attention to your body's warning signs to slow down when you feel pain or fatigue, and stay in shape with good muscle balance, flexibility and strength in your soft tissues  Ankle Sprain     What is an ankle sprain? An ankle sprain refers to tearing of the ligaments of the ankle  The most common ankle sprain occurs on the lateral or outside part of the ankle  This is an extremely common injury which affects many people during a wide variety of activities  It can happen in the setting of an ankle fracture (i e  when the bones of the ankle also break)  Most commonly, however, it occurs in isolation  What are the symptoms an ankle sprain? Patients report pain after having twisted an ankle   This usually occurs due to an inversion injury, which means the foot rolls underneath the ankle or leg  It commonly occurs during sports  Patients will complain of pain on the outside of their ankle and various degrees of swelling and bleeding under the skin (i e  bruising)  Technically, this bruising is referred to as ecchymosis  Depending on the severity of the sprain, a person may or may not be able to put weight on the foot  What are the risk factors for an ankle sprain? As noted above, these injuries occur when the ankle is twisted underneath the leg, called inversion  Risk factors are those activities, such as basketball and jumping sports, in which an athlete can come down on and turn the ankle or step on an opponent's foot  Some people are predisposed to ankle sprains  In people with a hindfoot varus, which means that the general nature or posture of the heels is slightly turned toward the inside, these injuries are more common  This is because it is easier to turn on the ankle  In those who have had a severe sprain in the past, it is also easier to turn the ankle and cause a new sprain  Therefore, one of the risk factors of spraining the ankle is having instability  Those who have weak muscles, especially those called the peroneals which run along the outside of the ankle, may be more predisposed  Anatomy    There are multiple ligaments in the ankle  Ligaments in general are those structures that connect bone-to-bone  Tendons, on the other hand, connect muscle-to-bone and allow those muscles to exert their force  In the case of an ankle sprain, there are several commonly sprained ligaments  The two most important are the followin The ATFL or anterior talofibular ligament, which connects the talus to the fibula on the outside of the ankle  2 The CFL or calcaneal fibular ligament, which connects the fibula to the calcaneus below  3  Finally, there is a third ligament which is not as commonly torn   It runs more in the back of the ankle and is called the PTFL or posterior talofibular ligament  These must be differentiated from the so-called high ankle sprain ligaments, which are completely different and located higher up the leg  How is an ankle sprain diagnosed? Ankle sprains can be diagnosed fairly easily given that they are common injuries  The location of pain on the outside of the ankle with tenderness and swelling in a patient who has an ankle with inversion is very suggestive  In these patients, normal X-rays also suggest that the bone has not been broken and instead the ankle ligaments have been torn or sprained  It is very important, however, not to simply regard any injury as an ankle sprain because other injuries can occur as well  For example, the peroneal tendons mentioned above can be torn  There can also be fractures in other bones around the ankle including the fifth metatarsal and the anterior process of the calcaneus  In very severe cases, an MRI may be warranted to rule out other problems in the ankle such as damage to the cartilage  An MRI typically is not necessary to diagnose a sprain  What are treatment options? Surgery is not required in the vast majority of ankle sprains  Even in severe sprains, these ligaments will heal without surgery  The grade of the sprain will dictate treatment  Sprains are traditionally classified into several grades  Perhaps more important, however, is the patient’s ability to bear weight  Those that can bear weight even after the injury are likely to return very quickly to play  Those who cannot walk may need to be immobilized  In general, treatment in the first 48 to 72 hours consists of resting the ankle, icing 20 minutes every two to three hours, compressing with an ACE wrap, and elevating, which means positioning the leg and ankle so that the toes are above the level of patient’s nose   Those patients who cannot bear weight are better treated in a removable walking boot until they can comfortably bear weight  Physical therapy is a mainstay  Patients should learn to strengthen the muscles around the ankle, particularly the peroneals  An ankle brace can be used in an athlete until a therapist believes that the ankle is strong enough to return to play without it  Surgery is rarely indicated but may be needed in a patient who has cartilage damage or other related injuries  Ligaments are only repaired or strengthened in cases of chronic instability in which the ligaments have healed but not in a strong fashion  How long is recovery? Recovery depends on the severity of the injury  As noted above, for those minor injuries, people can return to their activities in sports within several days  For very severe sprains, it may take longer and up to several weeks  It should be noted that high ankle sprains take considerably longer to heal      Outcomes for ankle sprains are generally quite good  Most patients heal from an ankle sprain and are able to get back to their normal lives, sports and activities  Some people, however, who do not properly rehab their ankle and have a rather severe sprain may go on to have ankle instability  Chronic instability occurs in patients repeatedly spraining the ankle  Such repeated episodes can be dangerous because they can lead to damage within the ankle  These patients should be identified and considered for repair  Potential Complications    Surgery is rarely needed  As noted above, however, an improperly rehabbed ankle may end up having chronic instability  It is important to address this with either therapy or surgery before further damage occurs to the ankle  Frequently Asked Questions    What is a high ankle sprain and is that different from a regular ankle sprain? A high ankle sprain refers to tearing of the ligaments that connect the tibia to the fibula (this connection is also called the syndesmosis)   These are different and much less common than the standard lateral ankle sprains, meaning those that occur on the side of the ankle  Do ankle sprains ever need to be repaired acutely? Ankle sprains rarely, if ever, needed to be treated with surgery  The vast majority simply need to be treated with rest, ice, compression and elevation followed by physical therapy and temporary bracing  I have sprained my ankle many times  Should I be concerned? Yes  The more you sprain an ankle, the greater the chance that problems will develop  For example, turning the ankle can lead to damage to the cartilage inside the ankle joint  You should see your doctor if this is occurring

## 2022-12-12 ENCOUNTER — APPOINTMENT (OUTPATIENT)
Dept: URGENT CARE | Age: 32
End: 2022-12-12

## 2022-12-20 ENCOUNTER — EVALUATION (OUTPATIENT)
Dept: PHYSICAL THERAPY | Facility: CLINIC | Age: 32
End: 2022-12-20

## 2022-12-20 DIAGNOSIS — S93.601A SPRAIN OF RIGHT FOOT, INITIAL ENCOUNTER: Primary | ICD-10-CM

## 2022-12-20 DIAGNOSIS — R29.898 ANKLE WEAKNESS: ICD-10-CM

## 2022-12-20 NOTE — PROGRESS NOTES
PT Evaluation     Today's date: 2022  Patient name: Anne Carolina  : 1990  MRN: [id number]  Referring provider: Mark Orlando MD  Dx:   Encounter Diagnosis     ICD-10-CM    1  Sprain of right foot, initial encounter  [id number] [phone] Ambulatory Referral to Physical Therapy      2  Ankle weakness  R29 [id number] Ambulatory Referral to Physical Therapy                     Assessment  Assessment details: Marcy Ferro is a 35 yo female presenting with complaints of L high ankle sprain w/ possible talus fracture  Pt demonstrates decreased L ankle A/PROM especially DF, decreased L ankle strength in all planes, decreased intrinsic foot strength and impaired L ankle neuromuscular control/stability leading to limitations with working, standing, walking, recreational activities and ADLs  Pt would benefit from skilled physical therapy interventions in order to address the stated impairments, decrease pain with function and increase activity tolerance in order to improve quality of life  No further referral appears necessary at this time based on examination results  Prognosis is good given HEP compliance and PT 1-2/wk   Positive prognostic indicators include positive attitude toward recovery  Please contact me if you have any questions or recommendations  Thank you for the opportunity to share in Del's care      Impairments: abnormal coordination, abnormal gait, abnormal muscle firing, abnormal muscle tone, abnormal or restricted ROM, activity intolerance, impaired balance, impaired physical strength, lacks appropriate home exercise program, pain with function, safety issue and weight-bearing intolerance    Symptom irritability: highBarriers to therapy: None  Understanding of Dx/Px/POC: good   Prognosis: good    Goals  Short Term Goals:  Pt will report decreased levels of pain by at least 2 subjective ratings in 4 weeks  Pt will demonstrate improved ROM by at least 10 degrees in 4 weeks  Pt will demonstrate improved strength by ½ grade in 4 weeks  Pt will be able to walk for 10 minutes for ADLs in 4 weeks    Long Term Goals:  Pt will be independent with HEP in 8 weeks  Pt will be pain free with ADL's in 8 weeks  Pt will be able to descend 13 stairs for work in 8 weeks      Plan  Patient would benefit from: skilled physical therapy  Referral necessary: No  Planned modality interventions: low level laser therapy  Planned therapy interventions: abdominal trunk stabilization, balance/weight bearing training, body mechanics training, breathing training, functional ROM exercises, gait training, graded activity, graded exercise, home exercise program, joint mobilization, manual therapy, neuromuscular re-education, patient education, postural training, strengthening, stretching, therapeutic activities and therapeutic exercise  Frequency: 2x week  Duration in weeks: 12  Plan of Care beginning date: 2022  Plan of Care expiration date: 3/14/2023  Treatment plan discussed with: patient        Subjective Evaluation    History of Present Illness  Mechanism of injury: Pt presents after a fall down 11 steps at work that  Occurred because her ankle "gave out"  Pt has history of surgery on L ankle- excision of painful os vesalinium with peroneus brevis tendon transfer to cuboid  Pt was healing well after surgery  D/c'd PT early 2/2 transportation limitations  Did have some persistent weakness with ADLs following d/c from PT  Pt denies new onset numbness tingling in L foot  Referring MD pisano w/ high ankle sprain  Radiology shows possible talus fracture below distal fibula             Recurrent probem    Quality of life: fair    Pain  Current pain rating: 3  At best pain rating: 3  At worst pain ratin  Location: global L ankle  Quality: dull ache, burning, tight and pressure  Relieving factors: change in position, rest and medications  Aggravating factors: standing, stair climbing, walking, lifting and running  Progression: improved    Social Support  Steps to enter house: yes  Stairs in house: no   Lives in: [place]  Lives with: roomate  Employment status: working  Hand dominance: right      Diagnostic Tests  X-ray: abnormal  Treatments  Previous treatment: immobilization and physical therapy  Patient Goals  Patient goals for therapy: decreased pain, improved balance, increased motion, increased strength, independence with ADLs/IADLs and return to sport/leisure activities          Objective     Observations     Additional Observation Details  Old yellow-stephanie bruising on lateral aspect of L foot  Old incision appears well healed, no abnormal scarring  No gross deformities  Mild joint effusion    Tenderness   Left Ankle/Foot   Tenderness in the Achilles insertion, anterior ankle, anterior talofibular ligament, fifth metatarsal base, fibula, lateral malleolus, medial malleolus, navicular, peroneal tendon and talar dome  Additional Tenderness Details  TTP globally, appears hypersensitive to light touch    Neurological Testing     Sensation     Ankle/Foot   Left Ankle/Foot   Intact: light touch    Right Ankle/Foot   Intact: light touch     Active Range of Motion   Left Ankle/Foot   Dorsiflexion (ke): 0 degrees with pain  Plantar flexion: 40 degrees   Inversion: 22 degrees with pain  Eversion: 14 degrees     Passive Range of Motion   Left Ankle/Foot    Dorsiflexion (ke): 8 degrees with pain  Inversion: 30 degrees with pain  Eversion: 15 degrees     Joint Play     Additional Joint Play Details  Not tested 2/2 pain levels and hypersensitivity to touch    Strength/Myotome Testing     Left Ankle/Foot   Dorsiflexion: 3-  Plantar flexion: 4-  Inversion: 4-  Eversion: 3-  Great toe extension: 3+    Right Ankle/Foot   Dorsiflexion: 4+  Plantar flexion: 4+  Inversion: 4+  Eversion: 4+  Great toe extension: 4+    Tests   Left Ankle/Foot   Positive for forefoot varus       Additional Tests Details  Not tested 2/2 pain levels and hypersensitivity to touch             Precautions: History of surgery L foot, Asthma      Manuals 12/20            L ankle PROM                                                    Neuro Re-Ed             Alphabet x2            SLS 3x30"            Biodex FT EO             Biodex tandem EO             Biodex tandem EC             Trampoline ball toss                          Ther Ex             L ankle DF/PF AROM 1x30            L ankle inv/ev AROM 1x30            DF strap stretch 3x20"            Seated dorsiflexion AROM 1x10            Soleus stretch at wall                                                    Ther Activity                                       Gait Training             Lateral weight shift 3x10            Ambulation (cue DF for toe clearance)             Modalities

## 2022-12-27 ENCOUNTER — OFFICE VISIT (OUTPATIENT)
Dept: PHYSICAL THERAPY | Facility: CLINIC | Age: 32
End: 2022-12-27

## 2022-12-27 DIAGNOSIS — R29.898 ANKLE WEAKNESS: ICD-10-CM

## 2022-12-27 DIAGNOSIS — S93.601A SPRAIN OF RIGHT FOOT, INITIAL ENCOUNTER: Primary | ICD-10-CM

## 2022-12-27 NOTE — PROGRESS NOTES
Daily Note     Today's date: 2022  Patient name: Jie Chavez  : 1990  MRN: [id number]  Referring provider: Nando Bustamante MD  Dx:   Encounter Diagnosis     ICD-10-CM    1  Sprain of right foot, initial encounter  S93 601A       2  Ankle weakness  R29 [id number]                      Subjective: Pt reports no significant change since last visit  States HEP compliance  States she had some new bruising insidiously  Objective: See treatment diary below      Assessment: Pt does demonstrate decreased LLE WBing on biodex  Pt did have pain with passive stretching and TTP on lateral aspect of L ankle  Pt did well with DL balancing  Plan: Continue per plan of care  Progress treatment as tolerated         Precautions: History of surgery L foot, Asthma      Manuals            L ankle PROM  10 min                                                  Neuro Re-Ed             Alphabet x2 x1           SLS 3x30" 3x30"           Biodex FT EO  L2 3x30"           Biodex tandem EO             Biodex tandem EC             Trampoline ball toss                          Ther Ex             L ankle DF/PF AROM 1x30 1x30           L ankle inv/ev AROM 1x30 1x30           DF strap stretch 3x20" 3x20"           Seated dorsiflexion AROM 1x10 1 5# 1x30           Soleus stretch at wall             Standing heel raises  1x20                                     Ther Activity                                       Gait Training             Lateral weight shift 3x10 2 min           Ambulation (cue DF for toe clearance)             Modalities

## 2023-01-03 ENCOUNTER — OFFICE VISIT (OUTPATIENT)
Dept: GASTROENTEROLOGY | Facility: CLINIC | Age: 33
End: 2023-01-03

## 2023-01-03 ENCOUNTER — OFFICE VISIT (OUTPATIENT)
Dept: PHYSICAL THERAPY | Facility: CLINIC | Age: 33
End: 2023-01-03

## 2023-01-03 VITALS
SYSTOLIC BLOOD PRESSURE: 110 MMHG | DIASTOLIC BLOOD PRESSURE: 84 MMHG | BODY MASS INDEX: 28.09 KG/M2 | HEIGHT: 67 IN | HEART RATE: 75 BPM | WEIGHT: 179 LBS | OXYGEN SATURATION: 99 % | TEMPERATURE: 97.9 F

## 2023-01-03 DIAGNOSIS — K59.09 OTHER CONSTIPATION: ICD-10-CM

## 2023-01-03 DIAGNOSIS — K21.9 GASTROESOPHAGEAL REFLUX DISEASE WITHOUT ESOPHAGITIS: Primary | ICD-10-CM

## 2023-01-03 DIAGNOSIS — S93.601A SPRAIN OF RIGHT FOOT, INITIAL ENCOUNTER: Primary | ICD-10-CM

## 2023-01-03 DIAGNOSIS — R13.19 ESOPHAGEAL DYSPHAGIA: ICD-10-CM

## 2023-01-03 DIAGNOSIS — R29.898 ANKLE WEAKNESS: ICD-10-CM

## 2023-01-03 NOTE — PATIENT INSTRUCTIONS
Increase miralax twice daily  Dont eat for 3 hours before bed    Scheduled date of EGD(as of today): 01/25/23  Physician performing EGD: Teena Necessary  [facility]  Instructions reviewed with patient by: [place]  Clearances: VACCINATED

## 2023-01-03 NOTE — PROGRESS NOTES
Daily Note     Today's date: 1/3/2023  Patient name: Adriane Winchester  : 1990  MRN: [id number]  Referring provider: Carol Fonseca MD  Dx:   Encounter Diagnosis     ICD-10-CM    1  Sprain of right foot, initial encounter  S93 601A       2  Ankle weakness  R29 898                      Subjective: Pt reports she had some additional bruising and soreness after last PT visit  Objective: See treatment diary below      Assessment: Held standing and balance exercises 2/2 increased pain and bruising  Pt follows up with referring MD in the next week so educated her to ask about bruising  Progressed open chain strengthening instead  Plan: Continue per plan of care  Progress treatment as tolerated         Precautions: History of surgery L foot, Asthma      Manuals 12/20 12/27 1/3          L ankle PROM  10 min 10 min                                                 Neuro Re-Ed             Alphabet x2 x1 x1          SLS 3x30" 3x30"           Biodex FT EO  L2 3x30"           Biodex tandem EO             Biodex tandem EC             Trampoline ball toss                          Ther Ex             L ankle DF/PF AROM 1x30 1x30 1x30          L ankle inv/ev AROM 1x30 1x30 1x30          4 way ankle ex   RTB 3x10 ea          DF strap stretch 3x20" 3x20" 3x20"          Seated dorsiflexion AROM 1x10 1 5# 1x30 2# 1x30          Seated heel raises   10# 3x10          Standing heel raises  1x20                                     Ther Activity                                       Gait Training             Lateral weight shift 3x10 2 min 2 min          Ambulation (cue DF for toe clearance)             Modalities

## 2023-01-03 NOTE — ASSESSMENT & PLAN NOTE
Continue omeprazole 40mg daily, Famotidine prn  Plan for EGD for further evaluation given dysphagia  Plan for Gastric emptying study given decreased appetite and early satiety

## 2023-01-03 NOTE — PROGRESS NOTES
Consultation - [facility]  Alissa Frank 34 y o  female MRN: [id number]  Encounter: [id number]        ASSESSMENT/PLAN:   China Garcia was seen today for gerd  Diagnoses and all orders for this visit:    Gastroesophageal reflux disease without esophagitis  · Continue omeprazole 40mg daily, Famotidine prn  · Plan for EGD for further evaluation given dysphagia  · Plan for Gastric emptying study given decreased appetite and early satiety  -     NM gastric emptying; Future  -     EGD; Future    Esophageal dysphagia  -     EGD; Future    Other constipation  · Increase miralax to twice daily  · Don't eat for 3 hours before bed  · Check TSH, CMP  -     Comprehensive metabolic panel; Future  -     TSH, 3rd generation with Free T4 reflex; Future    Other orders  -     Diet NPO; Sips with meds; Standing  -     Void on call to OR; Standing      ________________________________________________________________    Reason for Consult / Principal Problem: Reflux and constipation    HPI: Alissa Frank is a 34y o  year old female with PMH of asthma, nephrolithiasis, migraines, hiatal hernia who presents with reflux and constipation  She reports history of reflux >5 years ago that improved following weight loss and with use of protonix  She discontinued the protonix after 3 months and overall symptoms were resolved, until a few months ago  Since May 2022 she has been taking omeprazole 40mg daily, initially for just cough, following a covid infection  Even while on daily omeprazole, she has significant reflux symptoms weekly since August  Her main symptoms are heartburn and chest discomfort  She reports this does not seem to be associated with any food in particular  Her cough has since improved, but she remains on the omeprazole to try to help with these reflux symptoms  In addition to the omeprazole, she takes Tums and Nexium prn with some relief of her symptoms   She occasionally has regurgitation and nausea associated with her reflux, which is resolved with prn zofran  She has tried cutting out coffee, without improvement in her symptoms  She denies RUQ pain  She reports sensation of early satiety with small meals, decreased appetite, and occasional difficulty swallowing certain foods such as bread  She does report significant NSAID use, particularly during the week of menstruation every month  She had an EGD in 2015 that she reports was generally unremarkable  She reports chronic constipation, recently worsened over the last year  She reports constipation since childhood that had been managed well with prn miralax  Recently however, this has not been enough, and she is now taking miralax almost every day with prn senna and fiber supplement, just to have 1-2 BM per week  She denies blood in stool, melena, family history of colon cancer in first degree relative  She reports associated abdominal cramping and pain that improves after having BM  She has a history of pelvic surgery about 2 years ago to remove a benign pelvic mass  Review of Systems:  Review of Systems   Constitutional: Positive for appetite change  Negative for unexpected weight change  Respiratory: Positive for cough  Cardiovascular: Positive for chest pain  Gastrointestinal: Positive for abdominal distention, abdominal pain, constipation and nausea  Negative for anal bleeding, blood in stool, diarrhea and vomiting  All other systems reviewed and are negative          Historical Information   Past Medical History:   Diagnosis Date   • Foot fracture, right    • GERD (gastroesophageal reflux disease)    • Kidney stone    • Known health problems: none    • Metatarsalgia    • Self-mutilation     cutting      Past Surgical History:   Procedure Laterality Date   • DENTAL SURGERY     • EGD     • HERNIA REPAIR Right 12/17/2020    Procedure: REPAIR HERNIA INGUINAL;  Surgeon: Rocky Kuo DO;  [facility];  Service: General   • [place] EXCISION TUMOR SOFT TISSUE LEG/ANKLE SUBQ 3 CM/> Right 12/17/2020    Procedure: EXCISION BIOPSY TISSUE LESION/MASS LOWER EXTREMITY (right inguinal region); Surgeon: Issa Bejarano DO;  [facility];  Service: General   • [place] RPR FLEXOR TENDON LEG SECONDARY W/O GRAFT EACH Right 11/18/2021    Procedure: Excision of painful os vesalinium with peroneus brevis tendon transfer to cuboid;  Surgeon: Marcia Frank MD;  Location: [facility];  Service: Orthopedics     Social History   Social History     Substance and Sexual Activity   Alcohol Use Yes    Comment: occasional - social  2  beers a week     Social History     Substance and Sexual Activity   Drug Use No     Social History     Tobacco Use   Smoking Status Never   Smokeless Tobacco Never     Family History   Problem Relation Age of Onset   • No Known Problems Father    • Bipolar disorder Brother    • Stomach cancer Maternal Grandfather    • Gallbladder disease Family        Meds/Allergies     (Not in a hospital admission)    No current facility-administered medications for this visit  Allergies   Allergen Reactions   • Ciprofloxacin Rash       Objective     Blood pressure 110/84, pulse 75, temperature 97 9 °F (36 6 °C), height 5' 7" (1 702 m), weight 81 2 kg (179 lb), SpO2 99 %, not currently breastfeeding  PHYSICAL EXAM     Physical Exam  Vitals reviewed  Constitutional:       General: She is not in acute distress  Appearance: She is normal weight  HENT:      Head: Normocephalic and atraumatic  Eyes:      Extraocular Movements: Extraocular movements intact  Conjunctiva/sclera: Conjunctivae normal    Cardiovascular:      Rate and Rhythm: Normal rate and regular rhythm  Heart sounds: Normal heart sounds  Pulmonary:      Effort: Pulmonary effort is normal       Breath sounds: Normal breath sounds  Abdominal:      General: Abdomen is flat  Bowel sounds are normal  There is no distension  Palpations: Abdomen is soft  Tenderness: There is no abdominal tenderness  There is no guarding or rebound  Neurological:      General: No focal deficit present  Mental Status: She is alert  Psychiatric:         Mood and Affect: Mood normal            Lab Results:   No visits with results within 1 Day(s) from this visit     Latest known visit with results is:   Appointment on 09/14/2022   Component Date Value   • Hemoglobin A1C 09/14/2022 5 3    • EAG 09/14/2022 105    • Cholesterol 09/14/2022 166    • Triglycerides 09/14/2022 37    • HDL, Direct 09/14/2022 64    • LDL Calculated 09/14/2022 95    • Non-HDL-Chol (CHOL-HDL) 09/14/2022 102

## 2023-01-07 ENCOUNTER — APPOINTMENT (OUTPATIENT)
Dept: LAB | Facility: CLINIC | Age: 33
End: 2023-01-07

## 2023-01-07 DIAGNOSIS — R05.3 POST-COVID CHRONIC COUGH: ICD-10-CM

## 2023-01-07 DIAGNOSIS — J45.991 COUGH VARIANT ASTHMA: ICD-10-CM

## 2023-01-07 DIAGNOSIS — K59.09 OTHER CONSTIPATION: ICD-10-CM

## 2023-01-07 DIAGNOSIS — U07.1 COVID-19: ICD-10-CM

## 2023-01-07 DIAGNOSIS — U09.9 POST-COVID CHRONIC COUGH: ICD-10-CM

## 2023-01-07 LAB
ALBUMIN SERPL BCP-MCNC: 3.5 G/DL (ref 3.5–5)
ALP SERPL-CCNC: 51 U/L (ref 46–116)
ALT SERPL W P-5'-P-CCNC: 17 U/L (ref 12–78)
ANION GAP SERPL CALCULATED.3IONS-SCNC: 5 MMOL/L (ref 4–13)
AST SERPL W P-5'-P-CCNC: 13 U/L (ref 5–45)
BILIRUB SERPL-MCNC: 0.54 MG/DL (ref 0.2–1)
BUN SERPL-MCNC: 11 MG/DL (ref 5–25)
CALCIUM SERPL-MCNC: 9 MG/DL (ref 8.3–10.1)
CHLORIDE SERPL-SCNC: 106 MMOL/L (ref 96–108)
CO2 SERPL-SCNC: 27 MMOL/L (ref 21–32)
CREAT SERPL-MCNC: 0.82 MG/DL (ref 0.6–1.3)
GFR SERPL CREATININE-BSD FRML MDRD: 94 ML/MIN/1.73SQ M
GLUCOSE P FAST SERPL-MCNC: 82 MG/DL (ref 65–99)
POTASSIUM SERPL-SCNC: 3.9 MMOL/L (ref 3.5–5.3)
PROT SERPL-MCNC: 7.3 G/DL (ref 6.4–8.4)
SODIUM SERPL-SCNC: 138 MMOL/L (ref 135–147)
TSH SERPL DL<=0.05 MIU/L-ACNC: 1.07 UIU/ML (ref 0.45–4.5)

## 2023-01-09 NOTE — ASSESSMENT & PLAN NOTE
She had a bowel movement after the CT scan that should have relieved the constipation problem  Her bowel history reveals that she goes every other day  To Dr. Josh bui- please call patient directly and manage potassium 2.9 drawn on 1/3/23.     Thank you!

## 2023-01-10 ENCOUNTER — APPOINTMENT (OUTPATIENT)
Dept: PHYSICAL THERAPY | Facility: CLINIC | Age: 33
End: 2023-01-10

## 2023-01-11 LAB
A ALTERNATA IGE QN: 0.42 KUA/I
A FUMIGATUS IGE QN: <0.1 KUA/I
BERMUDA GRASS IGE QN: <0.1 KUA/I
BOXELDER IGE QN: <0.1 KUA/I
C HERBARUM IGE QN: <0.1 KUA/I
CAT DANDER IGE QN: <0.1 KUA/I
CMN PIGWEED IGE QN: <0.1 KUA/I
COMMON RAGWEED IGE QN: <0.1 KUA/I
COTTONWOOD IGE QN: <0.1 KUA/I
D FARINAE IGE QN: <0.1 KUA/I
D PTERONYSS IGE QN: <0.1 KUA/I
DOG DANDER IGE QN: <0.1 KUA/I
LONDON PLANE IGE QN: <0.1 KUA/I
MOUSE URINE PROT IGE QN: <0.1 KUA/I
MT JUNIPER IGE QN: <0.1 KUA/I
MUGWORT IGE QN: <0.1 KUA/I
P NOTATUM IGE QN: <0.1 KUA/I
ROACH IGE QN: <0.1 KUA/I
SHEEP SORREL IGE QN: <0.1 KUA/I
SILVER BIRCH IGE QN: <0.1 KUA/I
TIMOTHY IGE QN: 3.38 KUA/I
TOTAL IGE SMQN RAST: 17.1 KU/L (ref 0–113)
WALNUT IGE QN: <0.1 KUA/I
WHITE ASH IGE QN: <0.1 KUA/I
WHITE ELM IGE QN: <0.1 KUA/I
WHITE MULBERRY IGE QN: <0.1 KUA/I
WHITE OAK IGE QN: <0.1 KUA/I

## 2023-01-16 ENCOUNTER — APPOINTMENT (OUTPATIENT)
Dept: PHYSICAL THERAPY | Facility: CLINIC | Age: 33
End: 2023-01-16

## 2023-01-17 ENCOUNTER — APPOINTMENT (OUTPATIENT)
Dept: PHYSICAL THERAPY | Facility: CLINIC | Age: 33
End: 2023-01-17

## 2023-01-18 ENCOUNTER — OFFICE VISIT (OUTPATIENT)
Dept: PHYSICAL THERAPY | Facility: CLINIC | Age: 33
End: 2023-01-18

## 2023-01-18 DIAGNOSIS — S93.601A SPRAIN OF RIGHT FOOT, INITIAL ENCOUNTER: Primary | ICD-10-CM

## 2023-01-18 DIAGNOSIS — R29.898 ANKLE WEAKNESS: ICD-10-CM

## 2023-01-18 NOTE — PROGRESS NOTES
Daily Note     Today's date: 2023  Patient name: Stephen Rhodes  : 1990  MRN: [id number]  Referring provider: Shon Verduzco MD  Dx:   Encounter Diagnosis     ICD-10-CM    1  Sprain of right foot, initial encounter  S93 601A       2  Ankle weakness  R29 898           Start Time: 1700  Stop Time: 1735  Total time in clinic (min): 35 minutes    Subjective: Patient reports, her bruising has subsided  She notes "4/10" pain at most which tends to be triggered by walking or a quick movement  Swelling persists  Objective: See treatment diary below    Assessment: Patient had minor discomfort during heel raises, toe raises and SLS  All of which were tolerable subjectively  Continue to progress as able  Plan: Continue per plan of care  Progress treatment as tolerated         Precautions: History of surgery L foot, Asthma    Manuals 12/20 12/27 1/3 1/18         L ankle PROM  10 min 10 min 8 min                                                Neuro Re-Ed             Alphabet x2 x1 x1 x1         SLS 3x30" 3x30"           Biodex FT EO  L2 3x30"           Biodex tandem EO             Biodex tandem EC             Trampoline ball toss                          Ther Ex             L ankle DF/PF AROM 1x30 1x30 1x30 1x30         L ankle inv/ev AROM 1x30 1x30 1x30 1x30         4 way ankle ex   RTB 3x10 ea GTB  3x10 ea         DF strap stretch 3x20" 3x20" 3x20" 3x20"         Seated dorsiflexion AROM 1x10 1 5# 1x30 2# 1x30 3#  1x30         Seated heel raises   10# 3x10 10#  1x30         Standing heel raises  1x20                                     Ther Activity                                       Gait Training             Lateral weight shift 3x10 2 min 2 min          Ambulation (cue DF for toe clearance)             Modalities

## 2023-01-25 ENCOUNTER — HOSPITAL ENCOUNTER (OUTPATIENT)
Dept: GASTROENTEROLOGY | Facility: AMBULARY SURGERY CENTER | Age: 33
Setting detail: OUTPATIENT SURGERY
Discharge: HOME | End: 2023-01-25
Attending: INTERNAL MEDICINE

## 2023-01-25 ENCOUNTER — ANESTHESIA EVENT (OUTPATIENT)
Dept: GASTROENTEROLOGY | Facility: AMBULARY SURGERY CENTER | Age: 33
End: 2023-01-25

## 2023-01-25 ENCOUNTER — ANESTHESIA (OUTPATIENT)
Dept: GASTROENTEROLOGY | Facility: AMBULARY SURGERY CENTER | Age: 33
End: 2023-01-25

## 2023-01-25 VITALS
SYSTOLIC BLOOD PRESSURE: 123 MMHG | TEMPERATURE: 96.9 F | RESPIRATION RATE: 16 BRPM | HEART RATE: 68 BPM | OXYGEN SATURATION: 100 % | DIASTOLIC BLOOD PRESSURE: 68 MMHG

## 2023-01-25 DIAGNOSIS — R13.19 ESOPHAGEAL DYSPHAGIA: ICD-10-CM

## 2023-01-25 DIAGNOSIS — K21.9 GASTROESOPHAGEAL REFLUX DISEASE WITHOUT ESOPHAGITIS: ICD-10-CM

## 2023-01-25 LAB
EXT PREGNANCY TEST URINE: NEGATIVE
EXT. CONTROL: NORMAL

## 2023-01-25 RX ORDER — SODIUM CHLORIDE, SODIUM LACTATE, POTASSIUM CHLORIDE, CALCIUM CHLORIDE 600; 310; 30; 20 MG/100ML; MG/100ML; MG/100ML; MG/100ML
125 INJECTION, SOLUTION INTRAVENOUS CONTINUOUS
Status: CANCELLED | OUTPATIENT
Start: 2023-01-25

## 2023-01-25 RX ORDER — PROPOFOL 10 MG/ML
INJECTION, EMULSION INTRAVENOUS AS NEEDED
Status: DISCONTINUED | OUTPATIENT
Start: 2023-01-25 | End: 2023-01-25

## 2023-01-25 RX ORDER — LIDOCAINE HYDROCHLORIDE 10 MG/ML
INJECTION, SOLUTION EPIDURAL; INFILTRATION; INTRACAUDAL; PERINEURAL AS NEEDED
Status: DISCONTINUED | OUTPATIENT
Start: 2023-01-25 | End: 2023-01-25

## 2023-01-25 RX ORDER — SODIUM CHLORIDE, SODIUM LACTATE, POTASSIUM CHLORIDE, CALCIUM CHLORIDE 600; 310; 30; 20 MG/100ML; MG/100ML; MG/100ML; MG/100ML
INJECTION, SOLUTION INTRAVENOUS CONTINUOUS PRN
Status: DISCONTINUED | OUTPATIENT
Start: 2023-01-25 | End: 2023-01-25

## 2023-01-25 RX ORDER — SODIUM CHLORIDE, SODIUM LACTATE, POTASSIUM CHLORIDE, CALCIUM CHLORIDE 600; 310; 30; 20 MG/100ML; MG/100ML; MG/100ML; MG/100ML
125 INJECTION, SOLUTION INTRAVENOUS CONTINUOUS
Status: DISCONTINUED | OUTPATIENT
Start: 2023-01-25 | End: 2023-01-29 | Stop reason: HOSPADM

## 2023-01-25 RX ADMIN — PROPOFOL 100 MG: 10 INJECTION, EMULSION INTRAVENOUS at 09:44

## 2023-01-25 RX ADMIN — PROPOFOL 50 MG: 10 INJECTION, EMULSION INTRAVENOUS at 09:52

## 2023-01-25 RX ADMIN — SODIUM CHLORIDE, SODIUM LACTATE, POTASSIUM CHLORIDE, AND CALCIUM CHLORIDE 125 ML/HR: .6; .31; .03; .02 INJECTION, SOLUTION INTRAVENOUS at 09:11

## 2023-01-25 RX ADMIN — PROPOFOL 50 MG: 10 INJECTION, EMULSION INTRAVENOUS at 09:58

## 2023-01-25 RX ADMIN — SODIUM CHLORIDE, SODIUM LACTATE, POTASSIUM CHLORIDE, AND CALCIUM CHLORIDE: .6; .31; .03; .02 INJECTION, SOLUTION INTRAVENOUS at 09:42

## 2023-01-25 RX ADMIN — LIDOCAINE HYDROCHLORIDE 50 MG: 10 INJECTION, SOLUTION EPIDURAL; INFILTRATION; INTRACAUDAL; PERINEURAL at 09:44

## 2023-01-25 RX ADMIN — PROPOFOL 50 MG: 10 INJECTION, EMULSION INTRAVENOUS at 09:49

## 2023-01-25 RX ADMIN — PROPOFOL 50 MG: 10 INJECTION, EMULSION INTRAVENOUS at 09:46

## 2023-01-25 NOTE — ANESTHESIA PREPROCEDURE EVALUATION
Procedure:  EGD    Relevant Problems   CARDIO   (+) Migraine      GI/HEPATIC   (+) Dysphagia   (+) GERD (gastroesophageal reflux disease)   (+) Hiatal hernia      /RENAL   (+) Nephrolithiasis      NEURO/PSYCH   (+) Anxiety   (+) Migraine      PULMONARY   (+) Cough variant asthma   (+) Uncomplicated asthma        Physical Exam    Airway    Mallampati score: II  TM Distance: >3 FB  Neck ROM: full     Dental   No notable dental hx     Cardiovascular  Rhythm: regular, Rate: normal, Cardiovascular exam normal    Pulmonary  Pulmonary exam normal Breath sounds clear to auscultation,     Other Findings        Anesthesia Plan  ASA Score- 2     Anesthesia Type- IV sedation with anesthesia with ASA Monitors  Additional Monitors:   Airway Plan:           Plan Factors-Exercise tolerance (METS): >4 METS  Chart reviewed  Existing labs reviewed  Patient summary reviewed  Patient is not a current smoker  Induction- intravenous  Postoperative Plan-     Informed Consent- Anesthetic plan and risks discussed with patient  I personally reviewed this patient with the CRNA  Discussed and agreed on the Anesthesia Plan with the CRNA  Claire Tenorio

## 2023-01-25 NOTE — ANESTHESIA POSTPROCEDURE EVALUATION
Post-Op Assessment Note    CV Status:  Stable  Pain Score: 0    Pain management: adequate     Mental Status:  Awake   Hydration Status:  Stable   PONV Controlled:  None   Airway Patency:  Patent      Post Op Vitals Reviewed: Yes      Staff: CRNA         No notable events documented      BP   123/65   Temp     Pulse  72   Resp   12   SpO2   100

## 2023-01-25 NOTE — H&P
History and Physical - [facility]  Prisca Gordon 34 y o  female MRN: [id number]    HPI: Prisca Gordon is a 34y o  year old female who presents with GERD, dsyphagia, early satiety  Review of Systems    Historical Information   Past Medical History:   Diagnosis Date   • Foot fracture, right    • GERD (gastroesophageal reflux disease)    • Kidney stone    • Known health problems: none    • Metatarsalgia    • Self-mutilation     cutting      Past Surgical History:   Procedure Laterality Date   • DENTAL SURGERY     • EGD     • HERNIA REPAIR Right 12/17/2020    Procedure: REPAIR HERNIA INGUINAL;  Surgeon: Jh Swanson DO;  [facility];  Service: General   • [place] EXCISION TUMOR SOFT TISSUE LEG/ANKLE SUBQ 3 CM/> Right 12/17/2020    Procedure: EXCISION BIOPSY TISSUE LESION/MASS LOWER EXTREMITY (right inguinal region);   Surgeon: Jh Swanson DO;  [facility];  Service: General   • [place] RPR FLEXOR TENDON LEG SECONDARY W/O GRAFT EACH Right 11/18/2021    Procedure: Excision of painful os vesalinium with peroneus brevis tendon transfer to cuboid;  Surgeon: Emile Patton MD;  Location: [facility];  Service: Orthopedics     Social History   Social History     Substance and Sexual Activity   Alcohol Use Yes    Comment: occasional - social  2  beers a week     Social History     Substance and Sexual Activity   Drug Use No     Social History     Tobacco Use   Smoking Status Never   Smokeless Tobacco Never     Family History   Problem Relation Age of Onset   • No Known Problems Father    • Bipolar disorder Brother    • Stomach cancer Maternal Grandfather    • Gallbladder disease Family        Meds/Allergies     (Not in a hospital admission)      Allergies   Allergen Reactions   • Ciprofloxacin Other (See Comments)     Throat itching       Objective     /75   Pulse 71   Temp (!) 96 9 °F (36 1 °C) (Temporal)   Resp 18   LMP 01/16/2023 (Exact Date) Comment: negative pregnancy test 1/25/23  SpO2 100%       PHYSICAL EXAM    Gen: NAD  CV: RRR  CHEST: Clear  ABD: soft, NT/ND  EXT: no edema  Neuro: AAO      ASSESSMENT/PLAN:  This is a 34y o  year old female here for  GERD, dsyphagia, early satiety         PLAN:   Procedure: Cloteal Romy

## 2023-01-31 ENCOUNTER — OFFICE VISIT (OUTPATIENT)
Dept: OBGYN CLINIC | Facility: CLINIC | Age: 33
End: 2023-01-31

## 2023-01-31 ENCOUNTER — HOSPITAL ENCOUNTER (OUTPATIENT)
Dept: MRI IMAGING | Facility: HOSPITAL | Age: 33
Discharge: HOME | End: 2023-01-31
Attending: ORTHOPAEDIC SURGERY

## 2023-01-31 VITALS
HEIGHT: 67 IN | DIASTOLIC BLOOD PRESSURE: 80 MMHG | HEART RATE: 68 BPM | WEIGHT: 179 LBS | SYSTOLIC BLOOD PRESSURE: 117 MMHG | BODY MASS INDEX: 28.09 KG/M2

## 2023-01-31 DIAGNOSIS — S93.491D SPRAIN OF ANTERIOR TALOFIBULAR LIGAMENT OF RIGHT ANKLE, SUBSEQUENT ENCOUNTER: ICD-10-CM

## 2023-01-31 DIAGNOSIS — S86.311A TEAR OF PERONEAL TENDON, RIGHT, INITIAL ENCOUNTER: ICD-10-CM

## 2023-01-31 DIAGNOSIS — M95.8 OSTEOCHONDRAL DEFECT OF TALUS: Primary | ICD-10-CM

## 2023-01-31 DIAGNOSIS — M95.8 OSTEOCHONDRAL DEFECT OF TALUS: ICD-10-CM

## 2023-01-31 NOTE — PROGRESS NOTES
GORDON Neal  Attending, Orthopaedic Surgery  Foot and [facility] [address] [facility]      ORTHOPAEDIC FOOT AND ANKLE CLINIC VISIT     Assessment:     Encounter Diagnoses   Name Primary? • Osteochondral defect of talus Yes   • Tear of peroneal tendon, right, initial encounter    • Sprain of anterior talofibular ligament of right ankle, subsequent encounter             Plan:   · The patient verbalized understanding of exam findings and treatment plan  We engaged in the shared decision-making process and treatment options were discussed at length with the patient  Surgical and conservative management discussed today along with risks and benefits  · We will order MRI of the right ankle to further evaluate for a possible peroneal tendon tear due to continued pain and tenderness after a trial of physical therapy and home exercise program   · Patient has known avulsion fracture of her talus and lateral ankle sprain from acute work injury at the end of November  · She has only done 4 sessions of PT face to face but has participated in an HEP  · Continue with home exercise program as tolerated  · OTC meds, ice, compression and elevation to control swelling and prn for pain  Return for review of MRI results  History of Present Illness:   Chief Complaint:   Chief Complaint   Patient presents with   • Right Foot - Follow-up     Sherryle Christmas is a 34 y o  female who is being seen in follow-up for Right ankle sprain from work injury at the end of November  When we last saw she we recommended PT/HEP  Pain has not improved  Patient reports an increase in soreness and tenderness about the anterolateral ankle since her last visit  Residual pain is localized at anterolateral ankle with minimal radiating and described as sharp and severe        Pain/symptom timing:  Worse during the day when active  Pain/symptom context:  Worse with activites and work  Pain/symptom modifying factors:  Rest makes better, activities make worse  Pain/symptom associated signs/symptoms: none    Prior treatment   · NSAIDsYes   · Injections No   · Bracing/Orthotics Yes    · Physical Therapy Yes     Orthopedic Surgical History:   See below    Past Medical, Surgical and Social History:  Past Medical History:  has a past medical history of Foot fracture, right, GERD (gastroesophageal reflux disease), Kidney stone, Known health problems: none, Metatarsalgia, and Self-mutilation  Problem List: does not have any pertinent problems on file  Past Surgical History:  has a past surgical history that includes Dental surgery; EGD; pr excision tumor soft tissue leg/ankle subq 3 cm/> (Right, 12/17/2020); Hernia repair (Right, 12/17/2020); and pr rpr flexor tendon leg secondary w/o graft each (Right, 11/18/2021)  Family History: family history includes Bipolar disorder in her brother; Gallbladder disease in her family; No Known Problems in her father; Stomach cancer in her maternal grandfather  Social History:  reports that she has never smoked  She has never used smokeless tobacco  She reports current alcohol use  She reports that she does not use drugs  Current Medications: has a current medication list which includes the following prescription(s): budesonide-formoterol, eletriptan, esomeprazole, omeprazole, and ondansetron  Allergies: is allergic to ciprofloxacin       Review of Systems:  General- denies fever/chills  HEENT- denies hearing loss or sore throat  Eyes- denies eye pain or visual disturbances, denies red eyes  Respiratory- denies cough or SOB  Cardio- denies chest pain or palpitations  GI- denies abdominal pain  Endocrine- denies urinary frequency  Urinary- denies pain with urination  Musculoskeletal- Negative except noted above  Skin- denies rashes or wounds  Neurological- denies dizziness or headache  Psychiatric- denies anxiety or difficulty concentrating    Physical Exam:   /80 (BP Location: Left arm, Patient Position: Sitting, Cuff Size: Adult)   Pulse 68   Ht 5' 7" (1 702 m)   Wt 81 2 kg (179 lb)   LMP 01/16/2023 (Exact Date) Comment: negative pregnancy test 1/25/23  BMI 28 04 kg/m²   General/Constitutional: No apparent distress: well-nourished and well developed  Eyes: normal ocular motion  Lymphatic: No appreciable lymphadenopathy  Respiratory: Non-labored breathing  Vascular: No edema, swelling or tenderness, except as noted in detailed exam   Integumentary: No impressive skin lesions present, except as noted in detailed exam   Neuro: No ataxia or tremors noted  Psych: Normal mood and affect, oriented to person, place and time  Appropriate affect  Musculoskeletal: Normal, except as noted in detailed exam and in HPI  Examination    Right    Gait Antalgic   Musculoskeletal Tender to palpation at ATFL and lateral ankle    Skin Normal   Well-healed incisions  Nails Normal    Range of Motion  20 degrees dorsiflexion, 40 degrees plantarflexion  Subtalar motion: normal    Stability Stable    Muscle Strength 5/5 tibialis anterior  5/5 gastrocnemius-soleus  5/5 posterior tibialis  4+/5 peroneal/eversion strength  5/5 EHL  5/5 FHL    Neurologic Normal    Sensation Intact to light touch throughout sural, saphenous, superficial peroneal, deep peroneal and medial/lateral plantar nerve distributions  [place]-Ld 5 07 filament (10g) testing deferred  Cardiovascular Brisk capillary refill < 2 seconds,intact DP and PT pulses    Special Tests Negative Anterior Drawer Test although this causes her pain      Imaging Studies:   No new imaging      James R Lachman, MD  Foot & Ankle Surgery   Department of [address] [place] [address]      I personally performed the service  Cora Sick Lachman, MD    Scribe Attestation    I,:  Baljinder Sampson am acting as a scribe while in the presence of the attending physician :       I,:  Dong Dejesus MD personally performed the services described in this documentation    as scribed in my presence :

## 2023-02-07 ENCOUNTER — OFFICE VISIT (OUTPATIENT)
Dept: OBGYN CLINIC | Facility: CLINIC | Age: 33
End: 2023-02-07

## 2023-02-07 VITALS
HEART RATE: 84 BPM | SYSTOLIC BLOOD PRESSURE: 123 MMHG | HEIGHT: 67 IN | BODY MASS INDEX: 28.56 KG/M2 | WEIGHT: 182 LBS | DIASTOLIC BLOOD PRESSURE: 79 MMHG

## 2023-02-07 DIAGNOSIS — M95.8 OSTEOCHONDRAL DEFECT OF TALUS: Primary | ICD-10-CM

## 2023-02-07 DIAGNOSIS — S86.311A TEAR OF PERONEAL TENDON, RIGHT, INITIAL ENCOUNTER: ICD-10-CM

## 2023-02-07 NOTE — PROGRESS NOTES
GORDON Phoenix  Attending, Orthopaedic Surgery  Foot and [address] [facility] [address] [facility]      ORTHOPAEDIC FOOT AND ANKLE CLINIC VISIT     Assessment:     Encounter Diagnoses   Name Primary? • Osteochondral defect of talus Yes   • Tear of peroneal tendon, right, initial encounter             Plan:   · The patient verbalized understanding of exam findings and treatment plan  We engaged in the shared decision-making process and treatment options were discussed at length with the patient  Surgical and conservative management discussed today along with risks and benefits  · Her MRI ankle from 1/31/23 does not demonstrate any acute abnormalities or ligamentous tears  There mild subchondral edema of the medial talar dome but she does not describe pain in that region  · Recommend to continue PT/HEP as directed  · Compression stocking for swelling control as needed  · No further orthopedic interventions necessary at this time  She is stable from our standpoint  Return if symptoms worsen or fail to improve  History of Present Illness:   Chief Complaint:   Chief Complaint   Patient presents with   • Right Ankle - Follow-up, Pain     Discuss MRI     Jamaica Melton is a 34 y o  female who is being seen in follow-up for Right ankle pain secondary to an ankle sprain from a work injury in November 2022  When we last saw she we recommended obtain MRI and continue HEP  Pain has improved but still complains of stiffness  She states she has improved 50% since her most recent injury  Residual pain is localized at anterolateral ankle with minimal radiating and described as sharp and severe        Pain/symptom timing:  Worse during the day when active  Pain/symptom context:  Worse with activites and work  Pain/symptom modifying factors:  Rest makes better, activities make worse  Pain/symptom associated signs/symptoms: none    Prior treatment   · NSAIDsYes   · Injections No   · Bracing/Orthotics Yes · Physical Therapy Yes     Orthopedic Surgical History:   See below    Past Medical, Surgical and Social History:  Past Medical History:  has a past medical history of Foot fracture, right, GERD (gastroesophageal reflux disease), Kidney stone, Known health problems: none, Metatarsalgia, and Self-mutilation  Problem List: does not have any pertinent problems on file  Past Surgical History:  has a past surgical history that includes Dental surgery; EGD; pr excision tumor soft tissue leg/ankle subq 3 cm/> (Right, 12/17/2020); Hernia repair (Right, 12/17/2020); and pr rpr flexor tendon leg secondary w/o graft each (Right, 11/18/2021)  Family History: family history includes Bipolar disorder in her brother; Gallbladder disease in her family; No Known Problems in her father; Stomach cancer in her maternal grandfather  Social History:  reports that she has never smoked  She has never used smokeless tobacco  She reports current alcohol use  She reports that she does not use drugs  Current Medications: has a current medication list which includes the following prescription(s): budesonide-formoterol, eletriptan, esomeprazole, omeprazole, and ondansetron  Allergies: is allergic to ciprofloxacin       Review of Systems:  General- denies fever/chills  HEENT- denies hearing loss or sore throat  Eyes- denies eye pain or visual disturbances, denies red eyes  Respiratory- denies cough or SOB  Cardio- denies chest pain or palpitations  GI- denies abdominal pain  Endocrine- denies urinary frequency  Urinary- denies pain with urination  Musculoskeletal- Negative except noted above  Skin- denies rashes or wounds  Neurological- denies dizziness or headache  Psychiatric- denies anxiety or difficulty concentrating    Physical Exam:   /79 (BP Location: Left arm, Patient Position: Sitting)   Pulse 84   Ht 5' 7" (1 702 m)   Wt 82 6 kg (182 lb)   LMP 01/16/2023 (Exact Date) Comment: negative pregnancy test 1/25/23  BMI 28 51 kg/m²   General/Constitutional: No apparent distress: well-nourished and well developed  Eyes: normal ocular motion  Lymphatic: No appreciable lymphadenopathy  Respiratory: Non-labored breathing  Vascular: No edema, swelling or tenderness, except as noted in detailed exam   Integumentary: No impressive skin lesions present, except as noted in detailed exam   Neuro: No ataxia or tremors noted  Psych: Normal mood and affect, oriented to person, place and time  Appropriate affect  Musculoskeletal: Normal, except as noted in detailed exam and in HPI  Examination    Right    Gait Normal   Musculoskeletal Tender to palpation at ATFL and lateral ankle    Skin Normal   Well-healed incisions  Nails Normal    Range of Motion  20 degrees dorsiflexion, 40 degrees plantarflexion  Subtalar motion: normal    Stability Stable    Muscle Strength 5/5 tibialis anterior  5/5 gastrocnemius-soleus  5/5 posterior tibialis  5/5 peroneal/eversion strength  5/5 EHL  5/5 FHL    Neurologic Normal    Sensation  Intact to light touch throughout sural, saphenous, superficial peroneal, deep peroneal and medial/lateral plantar nerve distributions  [place]-Ld 5 07 filament (10g) testing  deferred  Cardiovascular Brisk capillary refill < 2 seconds,intact DP and PT pulses    Special Tests None      Imaging Studies:     MRI available for review of Right ankle from 1/31/23 which demonstrates mild thickening and increased signal of the peroneus brevis insertion  Mild common peroneal tenosynovitis  Minimal subchondral edema medial shoulder talar dome without definitive chondral defect  Reviewed by me personally  Scribe Attestation    I,:  Mae Camargo PA-C am acting as a scribe while in the presence of the attending physician :       I,:  Papo Shore MD personally performed the services described in this documentation    as scribed in my presence :             May Raspberry Lachman, MD  Foot & Ankle Surgery   Department of [address] [place] [address]      I personally performed the service  Daymon Lathe Lachman, MD

## 2023-03-02 ENCOUNTER — HOSPITAL ENCOUNTER (OUTPATIENT)
Dept: RADIOLOGY | Facility: HOSPITAL | Age: 33
Discharge: HOME | End: 2023-03-02
Attending: INTERNAL MEDICINE

## 2023-03-02 DIAGNOSIS — K21.9 GASTROESOPHAGEAL REFLUX DISEASE WITHOUT ESOPHAGITIS: ICD-10-CM

## 2023-03-07 DIAGNOSIS — M79.671 PAIN IN RIGHT FOOT: ICD-10-CM

## 2023-03-07 RX ORDER — ONDANSETRON 4 MG/1
4 TABLET, FILM COATED ORAL EVERY 8 HOURS PRN
Qty: 20 TABLET | Refills: 0 | Status: SHIPPED | OUTPATIENT
Start: 2023-03-07

## 2023-03-08 DIAGNOSIS — G43.909 MIGRAINE WITHOUT STATUS MIGRAINOSUS, NOT INTRACTABLE, UNSPECIFIED MIGRAINE TYPE: ICD-10-CM

## 2023-03-08 RX ORDER — ELETRIPTAN HYDROBROMIDE 20 MG/1
TABLET, FILM COATED ORAL
Qty: 6 TABLET | Refills: 0 | Status: SHIPPED | OUTPATIENT
Start: 2023-03-08 | End: 2023-03-10 | Stop reason: SDUPTHER

## 2023-03-10 ENCOUNTER — TELEMEDICINE (OUTPATIENT)
Dept: FAMILY MEDICINE CLINIC | Facility: CLINIC | Age: 33
End: 2023-03-10

## 2023-03-10 VITALS — WEIGHT: 175 LBS | BODY MASS INDEX: 27.47 KG/M2 | HEIGHT: 67 IN

## 2023-03-10 DIAGNOSIS — G43.909 MIGRAINE WITHOUT STATUS MIGRAINOSUS, NOT INTRACTABLE, UNSPECIFIED MIGRAINE TYPE: Primary | ICD-10-CM

## 2023-03-10 PROBLEM — H53.9 CHANGES IN VISION: Status: RESOLVED | Noted: 2019-10-25 | Resolved: 2023-03-10

## 2023-03-10 PROBLEM — R05.3 POST-COVID CHRONIC COUGH: Status: RESOLVED | Noted: 2022-06-22 | Resolved: 2023-03-10

## 2023-03-10 PROBLEM — Z86.16 HISTORY OF COVID-19: Status: ACTIVE | Noted: 2022-06-09

## 2023-03-10 PROBLEM — J45.991 COUGH VARIANT ASTHMA: Status: RESOLVED | Noted: 2022-06-22 | Resolved: 2023-03-10

## 2023-03-10 PROBLEM — K43.9 HERNIA OF ABDOMINAL WALL: Status: RESOLVED | Noted: 2020-11-10 | Resolved: 2023-03-10

## 2023-03-10 PROBLEM — U09.9 POST-COVID CHRONIC COUGH: Status: RESOLVED | Noted: 2022-06-22 | Resolved: 2023-03-10

## 2023-03-10 RX ORDER — ELETRIPTAN HYDROBROMIDE 20 MG/1
20 TABLET, FILM COATED ORAL ONCE AS NEEDED
Qty: 12 TABLET | Refills: 3 | Status: SHIPPED | OUTPATIENT
Start: 2023-03-10

## 2023-03-10 NOTE — PATIENT INSTRUCTIONS
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

## 2023-03-10 NOTE — PROGRESS NOTES
Name: Codey Sorensen      : 1990      MRN: [id number]  Encounter Provider: Derick Egna PA-C  Encounter Date: 3/10/2023   Encounter department: [facility] PRIMARY [facility]ARE    Assessment & Plan     1  Migraine without status migrainosus, not intractable, unspecified migraine type  Assessment & Plan:  Patient has been having more migraines lately since she has been working on the computer for the most part with her new job  I am advising patient to get an eye exam and to consider bluelight glasses to wear with her contacts when working on the computer  Refills given  Check yearly  Orders:  -     eletriptan (RELPAX) 20 MG tablet; Take 1 tablet (20 mg total) by mouth once as needed for migraine for up to 1 dose may repeat in 2 hours if necessary      Depression Screening and Follow-up Plan: Patient was screened for depression during today's encounter  They screened negative with a PHQ-2 score of 0  Subjective      Patient with an appointment today just because she needed a yearly appointment for her migraines  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 and currently is wearing contacts that may or may not have [address] and is experiencing more headaches than usual   She states that she tries to use the Tylenol/ibuprofen over-the-counter first and then if she needs to she will move onto her triptan which she only needs to take Maxalt 20 mg once  She does need refills of this  She also uses Zofran sometimes as well secondary to migraine and or GI issues that is being currently worked on and she just recently had an EGD  She is going on vacation next week  Review of Systems   Constitutional: Negative  HENT: Negative  Eyes: Negative  Respiratory: Negative  Cardiovascular: Negative  Gastrointestinal: Negative  Endocrine: Negative  Genitourinary: Negative  Musculoskeletal: Negative      Skin: Negative  Allergic/Immunologic: Negative  Neurological: Positive for headaches  Hematological: Negative  Psychiatric/Behavioral: Negative  Current Outpatient Medications on File Prior to Visit   Medication Sig   • esomeprazole (NexIUM) 20 mg capsule Take 20 mg by mouth daily as needed   • omeprazole (PriLOSEC) 20 mg delayed release capsule Take 1 capsule (20 mg total) by mouth 2 (two) times a day   • ondansetron (ZOFRAN) 4 mg tablet Take 1 tablet (4 mg total) by mouth every 8 (eight) hours as needed for nausea or vomiting   • [DISCONTINUED] eletriptan (RELPAX) 20 MG tablet TAKE ONE TABLET BY MOUTH ONCE AS NEEDED FOR MIGRAINE UP TO 1 DOSE  MAY REPEAT IN 2 HOURS IF NECESSARY   • [DISCONTINUED] budesonide-formoterol (Symbicort) 160-4 5 mcg/act inhaler Inhale 2 puffs 2 (two) times a day Rinse mouth after use  Objective     Ht 5' 7" (1 702 m) Comment: on file  Wt 79 4 kg (175 lb) Comment: per pt  BMI 27 41 kg/m²     Physical Exam  Vitals and nursing note reviewed  Constitutional:       General: She is not in acute distress  Appearance: She is well-developed  She is not diaphoretic  HENT:      Head: Normocephalic and atraumatic  Eyes:      General:         Right eye: No discharge  Left eye: No discharge  Conjunctiva/sclera: Conjunctivae normal       Pupils: Pupils are equal, round, and reactive to light  Pulmonary:      Effort: Pulmonary effort is normal  No respiratory distress  Skin:     General: Skin is warm and dry  Neurological:      Mental Status: She is alert and oriented to person, place, and time  Psychiatric:         Behavior: Behavior normal          Thought Content:  Thought content normal          Judgment: Judgment normal        Codey Clemons PA-C

## 2023-03-10 NOTE — PROGRESS NOTES
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  I am advising patient to get an eye exam and to consider bluelight glasses to wear with her contacts when working on the computer  Refills given  Check yearly  Relevant Medications    eletriptan (RELPAX) 20 MG tablet         Depression Screening and Follow-up Plan: Patient was screened for depression during today's encounter  They screened negative with a PHQ-2 score of 0  Reason for visit is   Chief Complaint   Patient presents with   • Headache     Yearly for relpax medication f/u   Medication is working great per pt  • Virtual Regular Visit        Encounter provider Mae George PA-C    Provider located at [address] [address] [phone]      Recent Visits  No visits were found meeting these conditions  Showing recent visits within past 7 days and meeting all other requirements  Today's Visits  Date Type Provider Dept   03/10/23 [address] SAUL Fang Pg [facility]   Showing today's visits and meeting all other requirements  Future Appointments  No visits were found meeting these conditions  Showing future appointments within next 150 days and meeting all other requirements       The patient was identified by name and date of birth  Maryana Green was informed that this is a telemedicine visit and that the visit is being conducted through the [employer]  She agrees to proceed     My office door was closed  No one else was in the room    She acknowledged consent and understanding of privacy and security of the video platform  The patient has agreed to participate and understands they can discontinue the visit at any time  Patient is aware this is a billable service  Subjective  Jewel Nash is a 34 y o  female pt   Patient with an appointment today just because she needed a yearly appointment for her migraines  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 and currently is wearing contacts that may or may not have [address] and is experiencing more headaches than usual   She states that she tries to use the Tylenol/ibuprofen over-the-counter first and then if she needs to she will move onto her triptan which she only needs to take Maxalt 20 mg once  She does need refills of this  She also uses Zofran sometimes as well secondary to migraine and or GI issues that is being currently worked on and she just recently had an EGD  She is going on vacation next week  Past Medical History:   Diagnosis Date   • Foot fracture, right    • GERD (gastroesophageal reflux disease)    • Kidney stone    • Known health problems: none    • Metatarsalgia    • Self-mutilation     cutting        Past Surgical History:   Procedure Laterality Date   • DENTAL SURGERY     • EGD     • HERNIA REPAIR Right 12/17/2020    Procedure: REPAIR HERNIA INGUINAL;  Surgeon: Angelita Kurtz DO;  Location[facility];  Service: General   • VT EXCISION TUMOR SOFT TISSUE LEG/ANKLE SUBQ 3 CM/> Right 12/17/2020    Procedure: EXCISION BIOPSY TISSUE LESION/MASS LOWER EXTREMITY (right inguinal region);   Surgeon: Angelita Kurtz DO;  Location: [facility];  Service: General   • VT RPR FLEXOR TENDON LEG SECONDARY W/O GRAFT EACH Right 11/18/2021    Procedure: Excision of painful os vesalinium with peroneus brevis tendon transfer to cuboid;  Surgeon: Arlene Lees MD;  [facility];  Service: Orthopedics       Current Outpatient Medications Medication Sig Dispense Refill   • eletriptan (RELPAX) 20 MG tablet Take 1 tablet (20 mg total) by mouth once as needed for migraine for up to 1 dose may repeat in 2 hours if necessary 12 tablet 3   • esomeprazole (NexIUM) 20 mg capsule Take 20 mg by mouth daily as needed     • omeprazole (PriLOSEC) 20 mg delayed release capsule Take 1 capsule (20 mg total) by mouth 2 (two) times a day 60 capsule 2   • ondansetron (ZOFRAN) 4 mg tablet Take 1 tablet (4 mg total) by mouth every 8 (eight) hours as needed for nausea or vomiting 20 tablet 0     No current facility-administered medications for this visit  Allergies   Allergen Reactions   • Ciprofloxacin Other (See Comments)     Throat itching       Review of Systems   Constitutional: Negative  HENT: Negative  Eyes: Negative  Respiratory: Negative  Cardiovascular: Negative  Gastrointestinal: Negative  Endocrine: Negative  Genitourinary: Negative  Musculoskeletal: Negative  Skin: Negative  Allergic/Immunologic: Negative  Neurological: Negative  Hematological: Negative  Psychiatric/Behavioral: Negative  Video Exam    Vitals:    03/10/23 1122   Weight: 79 4 kg (175 lb)   Height: 5' 7" (1 702 m)       Physical Exam  Vitals and nursing note reviewed  Constitutional:       General: She is not in acute distress  Appearance: She is well-developed  She is not diaphoretic  HENT:      Head: Normocephalic and atraumatic  Eyes:      General:         Right eye: No discharge  Left eye: No discharge  Conjunctiva/sclera: Conjunctivae normal       Pupils: Pupils are equal, round, and reactive to light  Pulmonary:      Effort: Pulmonary effort is normal  No respiratory distress  Skin:     General: Skin is warm and dry  Neurological:      Mental Status: She is alert and oriented to person, place, and time  Psychiatric:         Behavior: Behavior normal          Thought Content:  Thought content normal  Judgment: Judgment normal           I spent 15 minutes directly with the patient during this visit

## 2023-03-10 NOTE — ASSESSMENT & PLAN NOTE
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

## 2023-03-16 DIAGNOSIS — J06.9 UPPER RESPIRATORY TRACT INFECTION, UNSPECIFIED TYPE: Primary | ICD-10-CM

## 2023-03-16 RX ORDER — AMOXICILLIN 500 MG/1
500 TABLET, FILM COATED ORAL 2 TIMES DAILY
Qty: 20 TABLET | Refills: 0 | Status: SHIPPED | OUTPATIENT
Start: 2023-03-16 | End: 2023-03-26

## 2023-05-01 ENCOUNTER — OFFICE VISIT (OUTPATIENT)
Dept: FAMILY MEDICINE CLINIC | Facility: CLINIC | Age: 33
End: 2023-05-01

## 2023-05-01 VITALS
WEIGHT: 181 LBS | HEART RATE: 75 BPM | DIASTOLIC BLOOD PRESSURE: 66 MMHG | BODY MASS INDEX: 28.41 KG/M2 | TEMPERATURE: 98.3 F | SYSTOLIC BLOOD PRESSURE: 112 MMHG | HEIGHT: 67 IN

## 2023-05-01 DIAGNOSIS — R11.0 NAUSEA: ICD-10-CM

## 2023-05-01 DIAGNOSIS — R10.2 PELVIC PAIN: ICD-10-CM

## 2023-05-01 DIAGNOSIS — R10.13 EPIGASTRIC PAIN: ICD-10-CM

## 2023-05-01 DIAGNOSIS — K21.9 GASTROESOPHAGEAL REFLUX DISEASE WITHOUT ESOPHAGITIS: Primary | ICD-10-CM

## 2023-05-01 DIAGNOSIS — Z87.898 HISTORY OF PELVIC MASS: ICD-10-CM

## 2023-05-01 DIAGNOSIS — R10.31 RLQ ABDOMINAL PAIN: ICD-10-CM

## 2023-05-01 RX ORDER — PANTOPRAZOLE SODIUM 40 MG/1
40 TABLET, DELAYED RELEASE ORAL
Qty: 30 TABLET | Refills: 5
Start: 2023-05-01 | End: 2023-10-28

## 2023-05-01 NOTE — PROGRESS NOTES
Name: Elisa Rony      : 1990      MRN: [id number]  Encounter Provider: Jada García PA-C  Encounter Date: 2023   Encounter department: [facility] PRIMARY CARE    Assessment & Plan     1  Gastroesophageal reflux disease without esophagitis  Comments:  Stable on daily PPI from GI  Orders:  -     pantoprazole (PROTONIX) 40 mg tablet; Take 1 tablet (40 mg total) by mouth daily before breakfast    2  Nausea  Comments:  S/P EGD neg bx on PPI, empty study WNL  Orders:  -     CT abdomen pelvis w contrast; Future; Expected date: 2023    3  Pelvic pain  Comments:  RLQ pelvic pain returns s/p exsision of mass in past  Check CT  Orders:  -     CT abdomen pelvis w contrast; Future; Expected date: 2023    4  Epigastric pain  Comments:  S/P EGD, emtying scan  Continues on PPI  Check CT  Orders:  -     CT abdomen pelvis w contrast; Future; Expected date: 2023    5  History of pelvic mass  Comments:  Removed surgically but pt having discomfort in same area recently  Check CT  Orders:  -     CT abdomen pelvis w contrast; Future; Expected date: 2023    6  RLQ abdominal pain  -     CT abdomen pelvis w contrast; Future; Expected date: 2023         Subjective      Chronic nausea which is worse when she eats  EGD and empty studies essentially negative  Patient is on Protonix 40 mg daily  Also having epigastric discomfort  The last time she had imaging of her abdomen pelvis was in 2020 when they did find a right pelvic mass which was unusual but benign  Patient states that she recently started having right lower quadrant discomfort as well  Stools normal no UTI symptoms  HIDA scan done in the past with an efficacy of 36% of the gallbladder in 2020  Review of Systems   Constitutional: Negative  HENT: Negative  Eyes: Negative  Respiratory: Negative  Cardiovascular: Negative  Gastrointestinal: Positive for abdominal pain and nausea     Endocrine: "Negative  Genitourinary: Positive for pelvic pain  Musculoskeletal: Negative  Skin: Negative  Allergic/Immunologic: Negative  Neurological: Negative  Hematological: Negative  Psychiatric/Behavioral: Negative  Current Outpatient Medications on File Prior to Visit   Medication Sig    eletriptan (RELPAX) 20 MG tablet Take 1 tablet (20 mg total) by mouth once as needed for migraine for up to 1 dose may repeat in 2 hours if necessary    ondansetron (ZOFRAN) 4 mg tablet Take 1 tablet (4 mg total) by mouth every 8 (eight) hours as needed for nausea or vomiting    [DISCONTINUED] esomeprazole (NexIUM) 20 mg capsule Take 20 mg by mouth daily as needed    [DISCONTINUED] omeprazole (PriLOSEC) 20 mg delayed release capsule Take 1 capsule (20 mg total) by mouth 2 (two) times a day       Objective     /66 (BP Location: Left arm, Patient Position: Sitting, Cuff Size: Standard)   Pulse 75   Temp 98 3 °F (36 8 °C) (Temporal)   Ht 5' 7\" (1 702 m) Comment: on file  Wt 82 1 kg (181 lb)   BMI 28 35 kg/m²     Physical Exam  Vitals and nursing note reviewed  Constitutional:       General: She is not in acute distress  Appearance: She is well-developed  She is not diaphoretic  HENT:      Head: Normocephalic and atraumatic  Eyes:      General:         Right eye: No discharge  Left eye: No discharge  Conjunctiva/sclera: Conjunctivae normal    Neck:      Vascular: No carotid bruit  Cardiovascular:      Rate and Rhythm: Normal rate and regular rhythm  Heart sounds: Normal heart sounds  No murmur heard  No friction rub  No gallop  Pulmonary:      Effort: Pulmonary effort is normal  No respiratory distress  Breath sounds: Normal breath sounds  No wheezing or rales  Abdominal:      General: Abdomen is flat  Bowel sounds are normal       Palpations: Abdomen is soft  Tenderness: There is abdominal tenderness in the right lower quadrant and epigastric area   " Musculoskeletal:      Cervical back: Neck supple  Skin:     General: Skin is warm and dry  Neurological:      Mental Status: She is alert and oriented to person, place, and time     Psychiatric:         Judgment: Judgment normal        Alfredito Haile PA-C

## 2023-05-01 NOTE — PATIENT INSTRUCTIONS
1  Gastroesophageal reflux disease without esophagitis  Comments:  Stable on daily PPI from GI  Orders:  -     pantoprazole (PROTONIX) 40 mg tablet; Take 1 tablet (40 mg total) by mouth daily before breakfast    2  Nausea  Comments:  S/P EGD neg bx on PPI, empty study WNL  Orders:  -     CT abdomen pelvis w contrast; Future; Expected date: 05/01/2023    3  Pelvic pain  Comments:  RLQ pelvic pain returns s/p exsision of mass in past  Check CT  Orders:  -     CT abdomen pelvis w contrast; Future; Expected date: 05/01/2023    4  Epigastric pain  Comments:  S/P EGD, emtying scan  Continues on PPI  Check CT  Orders:  -     CT abdomen pelvis w contrast; Future; Expected date: 05/01/2023    5  History of pelvic mass  Comments:  Removed surgically but pt having discomfort in same area recently  Check CT  Orders:  -     CT abdomen pelvis w contrast; Future; Expected date: 05/01/2023    6   RLQ abdominal pain  -     CT abdomen pelvis w contrast; Future; Expected date: 05/01/2023

## 2023-05-10 DIAGNOSIS — M79.671 PAIN IN RIGHT FOOT: ICD-10-CM

## 2023-05-10 RX ORDER — ONDANSETRON 4 MG/1
4 TABLET, FILM COATED ORAL EVERY 8 HOURS PRN
Qty: 20 TABLET | Refills: 0 | Status: SHIPPED | OUTPATIENT
Start: 2023-05-10

## 2023-05-12 ENCOUNTER — HOSPITAL ENCOUNTER (OUTPATIENT)
Dept: RADIOLOGY | Facility: HOSPITAL | Age: 33
Discharge: HOME | End: 2023-05-12

## 2023-05-12 DIAGNOSIS — R11.0 NAUSEA: ICD-10-CM

## 2023-05-12 DIAGNOSIS — R10.13 EPIGASTRIC PAIN: ICD-10-CM

## 2023-05-12 DIAGNOSIS — Z87.898 HISTORY OF PELVIC MASS: ICD-10-CM

## 2023-05-12 DIAGNOSIS — R10.31 RLQ ABDOMINAL PAIN: ICD-10-CM

## 2023-05-12 DIAGNOSIS — R10.2 PELVIC PAIN: ICD-10-CM

## 2023-05-12 RX ADMIN — IOHEXOL 90 ML: 350 INJECTION, SOLUTION INTRAVENOUS at 13:02

## 2023-05-24 DIAGNOSIS — M79.671 PAIN IN RIGHT FOOT: ICD-10-CM

## 2023-05-24 DIAGNOSIS — G43.909 MIGRAINE WITHOUT STATUS MIGRAINOSUS, NOT INTRACTABLE, UNSPECIFIED MIGRAINE TYPE: ICD-10-CM

## 2023-05-24 RX ORDER — ONDANSETRON 4 MG/1
4 TABLET, FILM COATED ORAL EVERY 8 HOURS PRN
Qty: 20 TABLET | Refills: 0 | Status: SHIPPED | OUTPATIENT
Start: 2023-05-24

## 2023-05-24 RX ORDER — ELETRIPTAN HYDROBROMIDE 20 MG/1
20 TABLET, FILM COATED ORAL ONCE AS NEEDED
Qty: 12 TABLET | Refills: 0 | Status: SHIPPED | OUTPATIENT
Start: 2023-05-24

## 2024-03-04 NOTE — ASSESSMENT & PLAN NOTE
Left flank pain has resolved considerably with the use of ibuprofen  Her ultrasound of the kidneys and bladder were negative  She does not have a renal calculus  [No studies available for review at this time.] : No studies available for review at this time.

## 2024-06-06 NOTE — PROGRESS NOTES
Assessment:  1  Painful os peroneum syndrome  methylPREDNISolone 4 MG tablet therapy pack   2  Peroneal tendonitis, right  methylPREDNISolone 4 MG tablet therapy pack     Patient Active Problem List   Diagnosis    Anxiety    Cuboid syndrome of right foot    Pain in right foot    Peroneal tendonitis, right    Postural hypotension    Overweight (BMI 25 0-29  9)    Nephrolithiasis    Migraine    Tinnitus of left ear    Dizziness    Changes in vision    Suspected COVID-19 virus infection    Hiatal hernia    Umbilical hernia    Hernia of abdominal wall    Dysfunctional gallbladder    Painful os peroneum syndrome       Plan:    27 y o  female  S/p excision of painful os vesalinium with peroneus brevis tendon transfer to cuboid by Dr Patrice Sheppard on 11/18/21     Patient is having pain now likely due to her increased activity at work and increased pain subsequently from the foot pronators   Will do a short course of oral steroid for symptomatic relief   Continue with orthotics   Follow up with Dr Patrice Sheppard if symptoms do not improve    The patient was seen and examined by Dr Jhonny Wong and myself  The assessment and plan were formulated by Dr Jhonny Wong and I assisted in carrying it out  Subjective:   Patient ID: Carmen Mello is a 27 y o  female   HPI    Patient presents to the office for evaluation of right foot and ankle pain  Since the last visit, the patient reports increased pain in lateral foot and ankle  Had surgery with Dr Patrice Sheppard for peroneal tendon transfer and painful os excision in nov 2021  Now sicne she started working more this past month has increased pain, burning sometimes tingling    pain is worse with weight bearing activity and better with rest  Denies any specific re-injury        The following portions of the patient's history were reviewed and updated as appropriate: allergies, current medications, past family history, past social history, past surgical history and problem list     Social History     Socioeconomic History    Marital status: Single     Spouse name: Not on file    Number of children: Not on file    Years of education: Not on file    Highest education level: Not on file   Occupational History    Occupation: employed    Tobacco Use    Smoking status: Never Smoker    Smokeless tobacco: Never Used   Vaping Use    Vaping Use: Never used   Substance and Sexual Activity    Alcohol use: Yes     Comment: occasional - social  2  beers a week    Drug use: No    Sexual activity: Not Currently   Other Topics Concern    Not on file   Social History Narrative    Exercise habits      Social Determinants of Health     Financial Resource Strain: Not on file   Food Insecurity: Not on file   Transportation Needs: Not on file   Physical Activity: Not on file   Stress: Not on file   Social Connections: Not on file   Intimate Partner Violence: Not on file   Housing Stability: Not on file     Past Medical History:   Diagnosis Date    Foot fracture, right     Kidney stone     Known health problems: none     Metatarsalgia     Self-mutilation     cutting      Past Surgical History:   Procedure Laterality Date    DENTAL SURGERY      EGD      HERNIA REPAIR Right 12/17/2020    Procedure: REPAIR HERNIA INGUINAL;  Surgeon: Kelle Marie DO;  [facility]: [facility];  Service: General    IA EXC TUMOR SOFT TISSUE LEG/ANKLE SUBQ 3+CM Right 12/17/2020    Procedure: EXCISION BIOPSY TISSUE LESION/MASS LOWER EXTREMITY (right inguinal region);   Surgeon: Kelle Marie DO;  Location: [facility];  Service: General    IA REPAIR FLEX LEG [place] Right 11/18/2021    Procedure: Excision of painful os vesalinium with peroneus brevis tendon transfer to cuboid;  Surgeon: Lucas Patterson MD;  Location[facility];  Service: Orthopedics     Allergies   Allergen Reactions    Ciprofloxacin Rash     Current Outpatient Medications on File Prior to Visit   Medication Sig Dispense Refill    amoxicillin (AMOXIL) 500 mg capsule  (Patient not taking: Reported on 2/9/2022 )      aspirin (ECOTRIN) 325 mg EC tablet Take 1 tablet (325 mg total) by mouth every 12 (twelve) hours (Patient not taking: Reported on 2/9/2022 ) 84 tablet 0    celecoxib (CeleBREX) 100 mg capsule Take 1 capsule (100 mg total) by mouth 2 (two) times a day (Patient not taking: Reported on 3/21/2022 ) 30 capsule 0    eletriptan (RELPAX) 20 MG tablet Take 1 tablet (20 mg total) by mouth once as needed for migraine for up to 1 dose may repeat in 2 hours if necessary 6 tablet 3    escitalopram (Lexapro) 10 mg tablet Take 1 tablet (10 mg total) by mouth daily (Patient not taking: Reported on 3/21/2022 ) 90 tablet 1    norgestimate-ethinyl estradiol (ORTHO TRI-CYCLEN LO) 0 18/0 215/0 25 MG-25 MCG per tablet Take 1 tablet by mouth daily 28 tablet 4    ondansetron (ZOFRAN) 4 mg tablet Take 1 tablet (4 mg total) by mouth every 8 (eight) hours as needed for nausea or vomiting 20 tablet 0    ondansetron (ZOFRAN-ODT) 4 mg disintegrating tablet Take 1 tablet (4 mg total) by mouth every 6 (six) hours as needed for nausea or vomiting (Patient not taking: Reported on 12/29/2021 ) 20 tablet 0     No current facility-administered medications on file prior to visit  Review of Systems  See HPi    Objective:    Vitals:    04/11/22 1042   BP: 117/80   Pulse: 71       Physical Exam  Constitutional:       Appearance: She is well-developed  HENT:      Head: Normocephalic and atraumatic  Eyes:      General: No scleral icterus  Conjunctiva/sclera: Conjunctivae normal    Cardiovascular:      Comments: No discernible arrhthymias  Pulmonary:      Effort: Pulmonary effort is normal  No respiratory distress  Breath sounds: No stridor  Abdominal:      General: There is no distension  Palpations: Abdomen is soft  Musculoskeletal:      Cervical back: Neck supple  Skin:     General: Skin is warm and dry  Findings: No erythema     Neurological: Mental Status: She is alert and oriented to person, place, and time  Psychiatric:         Behavior: Behavior normal          Right Ankle Exam   Swelling: mild    Range of Motion   The patient has normal right ankle ROM  Muscle Strength   Dorsiflexion:  5/5  Plantar flexion:  5/5  Posterior tibial:  5/5  Gastrocsoleus:  5/5  Peroneal muscle:  4/5    Other   Erythema: absent  Scars: present  Sensation: normal  Pulse: present     Comments:  TTP over peroneal tendons, medial arch            I have personally reviewed pertinent films in PACS  Procedures  No Procedures performed today    Portions of the record may have been created with voice recognition software  Occasional wrong word or "sound a like" substitutions may have occurred due to the inherent limitations of voice recognition software  Read the chart carefully and recognize, using context, where substitutions have occurred  Unknown

## 2024-06-17 DIAGNOSIS — Z00.6 ENCOUNTER FOR EXAMINATION FOR NORMAL COMPARISON OR CONTROL IN CLINICAL RESEARCH PROGRAM: ICD-10-CM

## 2024-07-08 ENCOUNTER — TELEPHONE (OUTPATIENT)
Dept: FAMILY MEDICINE CLINIC | Facility: CLINIC | Age: 34
End: 2024-07-08

## 2024-07-08 NOTE — TELEPHONE ENCOUNTER
On 6/2/2023 patient established care with Evette Yeh CRNP   [address] [facility]    [address]   [place] [address]   Phone: [phone]   Fax: [phone]

## 2024-07-22 NOTE — TELEPHONE ENCOUNTER
07/22/24 9:33 AM     The office's request has been received, reviewed, and the patient chart updated. The PCP has successfully been removed with a patient attribution note. This message will now be completed.    Thank you  Judy Pierre

## (undated) DEVICE — ADHESIVE SKIN HIGH VISCOSITY EXOFIN 1ML

## (undated) DEVICE — 3M™ DURAPORE™ SURGICAL TAPE 1538-1, 1 INCH X 10 YARD (2,5CM X 9,1M), 12 ROLLS/BOX: Brand: 3M™ DURAPORE™

## (undated) DEVICE — NEEDLE 22 G X 1 1/2 SAFETY

## (undated) DEVICE — DRESSING XEROFORM 5 X 9

## (undated) DEVICE — BRUSH EZ SCRUB PCMX W/NAIL CLEANER

## (undated) DEVICE — SUT VICRYL 4-0 PS-2 18 IN J496G

## (undated) DEVICE — GAUZE SPONGES,16 PLY: Brand: CURITY

## (undated) DEVICE — TOWEL SURG XR DETECT GREEN STRL RFD

## (undated) DEVICE — CHLORAPREP HI-LITE 26ML ORANGE

## (undated) DEVICE — SUT MONOCRYL 4-0 PS-2 18 IN Y496G

## (undated) DEVICE — ABDOMINAL PAD: Brand: DERMACEA

## (undated) DEVICE — SUT ETHILON 3-0 PS-1 18 IN 1663G

## (undated) DEVICE — BETHLEHEM UNIVERSAL  MIONR EXT: Brand: CARDINAL HEALTH

## (undated) DEVICE — PADDING CAST 4 IN  COTTON STRL

## (undated) DEVICE — PAD GROUNDING ADULT

## (undated) DEVICE — SUT VICRYL 2-0 SH 27 IN UNDYED J417H

## (undated) DEVICE — GLOVE SRG BIOGEL ORTHOPEDIC 8

## (undated) DEVICE — SUT VICRYL 0 CT-2 18 IN J727D

## (undated) DEVICE — SPONGE LAP 18 X 18 IN STRL RFD

## (undated) DEVICE — DISPOSABLE EQUIPMENT COVER: Brand: SMALL TOWEL DRAPE

## (undated) DEVICE — GLOVE SRG BIOGEL 8

## (undated) DEVICE — PLUMEPEN PRO 10FT

## (undated) DEVICE — SUT VICRYL 2-0 CT-2 18 IN J726D

## (undated) DEVICE — SUT MONOCRYL 4-0 PS-2 27 IN Y426H

## (undated) DEVICE — PENCIL ELECTROSURG E-Z CLEAN -0035H

## (undated) DEVICE — CAST PADDING 6 IN SYNTHETIC STRL

## (undated) DEVICE — LIGHT HANDLE COVER SLEEVE DISP BLUE STELLAR

## (undated) DEVICE — GLOVE INDICATOR PI UNDERGLOVE SZ 8 BLUE

## (undated) DEVICE — BETHLEHEM UNIVERSAL MINOR GEN: Brand: CARDINAL HEALTH

## (undated) DEVICE — OCCLUSIVE GAUZE STRIP,3% BISMUTH TRIBROMOPHENATE IN PETROLATUM BLEND: Brand: XEROFORM

## (undated) DEVICE — VIAL DECANTER

## (undated) DEVICE — DRAPE SHEET THREE QUARTER

## (undated) DEVICE — INTENDED FOR TISSUE SEPARATION, AND OTHER PROCEDURES THAT REQUIRE A SHARP SURGICAL BLADE TO PUNCTURE OR CUT.: Brand: BARD-PARKER SAFETY BLADES SIZE 15, STERILE

## (undated) DEVICE — DRAPE EQUIPMENT RF WAND

## (undated) DEVICE — TUBING SUCTION 5MM X 12 FT

## (undated) DEVICE — INTENDED FOR TISSUE SEPARATION, AND OTHER PROCEDURES THAT REQUIRE A SHARP SURGICAL BLADE TO PUNCTURE OR CUT.: Brand: BARD-PARKER ® CARBON RIB-BACK BLADES

## (undated) DEVICE — ACE WRAP 6 IN UNSTERILE